# Patient Record
Sex: MALE | Race: BLACK OR AFRICAN AMERICAN | NOT HISPANIC OR LATINO | Employment: UNEMPLOYED | URBAN - METROPOLITAN AREA
[De-identification: names, ages, dates, MRNs, and addresses within clinical notes are randomized per-mention and may not be internally consistent; named-entity substitution may affect disease eponyms.]

---

## 2023-03-16 LAB
ANION GAP IN SER/PLAS: 10 MMOL/L (ref 10–20)
CALCIUM (MG/DL) IN SER/PLAS: 8.7 MG/DL (ref 8.6–10.3)
CARBON DIOXIDE, TOTAL (MMOL/L) IN SER/PLAS: 25 MMOL/L (ref 21–32)
CHLORIDE (MMOL/L) IN SER/PLAS: 105 MMOL/L (ref 98–107)
CREATININE (MG/DL) IN SER/PLAS: 1.22 MG/DL (ref 0.5–1.3)
GFR MALE: 74 ML/MIN/1.73M2
GLUCOSE (MG/DL) IN SER/PLAS: 114 MG/DL (ref 74–99)
POTASSIUM (MMOL/L) IN SER/PLAS: 3.5 MMOL/L (ref 3.5–5.3)
PROSTATE SPECIFIC AG (NG/ML) IN SER/PLAS: <0.01 NG/ML (ref 0–4)
SODIUM (MMOL/L) IN SER/PLAS: 136 MMOL/L (ref 136–145)
UREA NITROGEN (MG/DL) IN SER/PLAS: 11 MG/DL (ref 6–23)

## 2023-04-11 ENCOUNTER — HOSPITAL ENCOUNTER (OUTPATIENT)
Dept: DATA CONVERSION | Facility: HOSPITAL | Age: 48
End: 2023-04-11
Attending: INTERNAL MEDICINE | Admitting: INTERNAL MEDICINE
Payer: COMMERCIAL

## 2023-04-11 DIAGNOSIS — Z85.46 PERSONAL HISTORY OF MALIGNANT NEOPLASM OF PROSTATE: ICD-10-CM

## 2023-04-11 DIAGNOSIS — F17.200 NICOTINE DEPENDENCE, UNSPECIFIED, UNCOMPLICATED: ICD-10-CM

## 2023-04-11 DIAGNOSIS — K52.9 NONINFECTIVE GASTROENTERITIS AND COLITIS, UNSPECIFIED: ICD-10-CM

## 2023-04-11 DIAGNOSIS — R11.0 NAUSEA: ICD-10-CM

## 2023-04-11 DIAGNOSIS — K21.9 GASTRO-ESOPHAGEAL REFLUX DISEASE WITHOUT ESOPHAGITIS: ICD-10-CM

## 2023-04-11 DIAGNOSIS — F32.A DEPRESSION, UNSPECIFIED: ICD-10-CM

## 2023-04-11 DIAGNOSIS — K64.0 FIRST DEGREE HEMORRHOIDS: ICD-10-CM

## 2023-04-11 DIAGNOSIS — K44.9 DIAPHRAGMATIC HERNIA WITHOUT OBSTRUCTION OR GANGRENE: ICD-10-CM

## 2023-04-11 DIAGNOSIS — N52.9 MALE ERECTILE DYSFUNCTION, UNSPECIFIED: ICD-10-CM

## 2023-04-11 DIAGNOSIS — R19.7 DIARRHEA, UNSPECIFIED: ICD-10-CM

## 2023-04-11 DIAGNOSIS — G47.33 OBSTRUCTIVE SLEEP APNEA (ADULT) (PEDIATRIC): ICD-10-CM

## 2023-04-11 DIAGNOSIS — R10.11 RIGHT UPPER QUADRANT PAIN: ICD-10-CM

## 2023-04-11 DIAGNOSIS — K57.30 DIVERTICULOSIS OF LARGE INTESTINE WITHOUT PERFORATION OR ABSCESS WITHOUT BLEEDING: ICD-10-CM

## 2023-04-11 DIAGNOSIS — R12 HEARTBURN: ICD-10-CM

## 2023-04-14 LAB
COMPLETE PATHOLOGY REPORT: NORMAL
CONVERTED CLINICAL DIAGNOSIS-HISTORY: NORMAL
CONVERTED FINAL DIAGNOSIS: NORMAL
CONVERTED FINAL REPORT PDF LINK TO COPY AND PASTE: NORMAL
CONVERTED GROSS DESCRIPTION: NORMAL

## 2023-04-19 DIAGNOSIS — M15.9 OSTEOARTHRITIS, GENERALIZED: Primary | ICD-10-CM

## 2023-04-19 RX ORDER — ACETAMINOPHEN 500 MG
TABLET ORAL
Qty: 180 TABLET | Refills: 11 | Status: SHIPPED | OUTPATIENT
Start: 2023-04-19 | End: 2024-04-11

## 2023-05-08 ENCOUNTER — OFFICE VISIT (OUTPATIENT)
Dept: PRIMARY CARE | Facility: CLINIC | Age: 48
End: 2023-05-08
Payer: COMMERCIAL

## 2023-05-08 VITALS
HEART RATE: 96 BPM | WEIGHT: 171 LBS | SYSTOLIC BLOOD PRESSURE: 118 MMHG | BODY MASS INDEX: 23.85 KG/M2 | TEMPERATURE: 98.7 F | DIASTOLIC BLOOD PRESSURE: 68 MMHG | OXYGEN SATURATION: 98 %

## 2023-05-08 DIAGNOSIS — M15.9 GENERALIZED OA: ICD-10-CM

## 2023-05-08 DIAGNOSIS — C61 MALIGNANT NEOPLASM OF PROSTATE (MULTI): ICD-10-CM

## 2023-05-08 DIAGNOSIS — I10 ESSENTIAL HYPERTENSION: ICD-10-CM

## 2023-05-08 DIAGNOSIS — F33.1 MODERATE RECURRENT MAJOR DEPRESSION (MULTI): ICD-10-CM

## 2023-05-08 DIAGNOSIS — K21.00 GASTROESOPHAGEAL REFLUX DISEASE WITH ESOPHAGITIS WITHOUT HEMORRHAGE: ICD-10-CM

## 2023-05-08 DIAGNOSIS — F41.1 ANXIETY STATE: Primary | ICD-10-CM

## 2023-05-08 DIAGNOSIS — N52.9 ERECTILE DISORDER: ICD-10-CM

## 2023-05-08 PROCEDURE — 99214 OFFICE O/P EST MOD 30 MIN: CPT | Performed by: FAMILY MEDICINE

## 2023-05-08 PROCEDURE — 3074F SYST BP LT 130 MM HG: CPT | Performed by: FAMILY MEDICINE

## 2023-05-08 PROCEDURE — 3078F DIAST BP <80 MM HG: CPT | Performed by: FAMILY MEDICINE

## 2023-05-08 RX ORDER — OMEPRAZOLE 20 MG/1
20 CAPSULE, DELAYED RELEASE ORAL
COMMUNITY
End: 2023-05-08 | Stop reason: SDUPTHER

## 2023-05-08 RX ORDER — LOSARTAN POTASSIUM AND HYDROCHLOROTHIAZIDE 25; 100 MG/1; MG/1
1 TABLET ORAL DAILY
COMMUNITY
Start: 2023-02-20 | End: 2023-05-08 | Stop reason: SDUPTHER

## 2023-05-08 RX ORDER — TAMSULOSIN HYDROCHLORIDE 0.4 MG/1
0.4 CAPSULE ORAL DAILY
Qty: 90 CAPSULE | Refills: 1 | Status: SHIPPED | OUTPATIENT
Start: 2023-05-08 | End: 2024-03-04 | Stop reason: SDUPTHER

## 2023-05-08 RX ORDER — DICYCLOMINE HYDROCHLORIDE 10 MG/1
10 CAPSULE ORAL 3 TIMES DAILY PRN
COMMUNITY
End: 2023-08-14 | Stop reason: SDUPTHER

## 2023-05-08 RX ORDER — DICLOFENAC SODIUM 50 MG/1
50 TABLET, DELAYED RELEASE ORAL DAILY
Qty: 90 TABLET | Refills: 1 | Status: SHIPPED | OUTPATIENT
Start: 2023-05-08 | End: 2023-08-09 | Stop reason: SDUPTHER

## 2023-05-08 RX ORDER — BUPROPION HYDROCHLORIDE 150 MG/1
300 TABLET ORAL EVERY MORNING
COMMUNITY
Start: 2022-03-16 | End: 2024-04-04 | Stop reason: WASHOUT

## 2023-05-08 RX ORDER — LOSARTAN POTASSIUM AND HYDROCHLOROTHIAZIDE 25; 100 MG/1; MG/1
1 TABLET ORAL DAILY
Qty: 90 TABLET | Refills: 3 | Status: SHIPPED | OUTPATIENT
Start: 2023-05-08 | End: 2024-02-27 | Stop reason: SDUPTHER

## 2023-05-08 RX ORDER — TRAZODONE HYDROCHLORIDE 50 MG/1
50 TABLET ORAL NIGHTLY
Qty: 90 TABLET | Refills: 1 | Status: SHIPPED | OUTPATIENT
Start: 2023-05-08 | End: 2023-11-16 | Stop reason: ALTCHOICE

## 2023-05-08 RX ORDER — QUETIAPINE FUMARATE 50 MG/1
50 TABLET, FILM COATED ORAL NIGHTLY
Qty: 90 TABLET | Refills: 2 | Status: SHIPPED | OUTPATIENT
Start: 2023-05-08 | End: 2023-09-13 | Stop reason: SDUPTHER

## 2023-05-08 RX ORDER — TAMSULOSIN HYDROCHLORIDE 0.4 MG/1
0.4 CAPSULE ORAL DAILY
COMMUNITY
End: 2023-05-08 | Stop reason: SDUPTHER

## 2023-05-08 RX ORDER — SILDENAFIL 100 MG/1
TABLET, FILM COATED ORAL
Qty: 10 TABLET | Refills: 2 | Status: SHIPPED | OUTPATIENT
Start: 2023-05-08 | End: 2023-08-07

## 2023-05-08 RX ORDER — DICLOFENAC SODIUM 50 MG/1
50 TABLET, DELAYED RELEASE ORAL DAILY
COMMUNITY
End: 2023-05-08 | Stop reason: SDUPTHER

## 2023-05-08 RX ORDER — TRAZODONE HYDROCHLORIDE 50 MG/1
50 TABLET ORAL NIGHTLY
COMMUNITY
End: 2023-05-08 | Stop reason: SDUPTHER

## 2023-05-08 RX ORDER — OMEPRAZOLE 20 MG/1
20 CAPSULE, DELAYED RELEASE ORAL
Qty: 180 CAPSULE | Refills: 2 | Status: SHIPPED | OUTPATIENT
Start: 2023-05-08 | End: 2023-11-16

## 2023-05-08 RX ORDER — QUETIAPINE FUMARATE 50 MG/1
50 TABLET, FILM COATED ORAL NIGHTLY
COMMUNITY
End: 2023-05-08 | Stop reason: SDUPTHER

## 2023-05-08 RX ORDER — CELECOXIB 100 MG/1
100 CAPSULE ORAL 2 TIMES DAILY
COMMUNITY
End: 2023-05-08 | Stop reason: ALTCHOICE

## 2023-05-08 ASSESSMENT — ENCOUNTER SYMPTOMS
INSOMNIA: 1
HYPERTENSION: 1

## 2023-05-08 NOTE — PROGRESS NOTES
Subjective   Patient ID: Shakila Shah is a 47 y.o. male who presents for Hypertension (Recheck) and Insomnia.  Hypertension    Insomnia      ED: Helped by Viagra    Prostate CA: Patient is following up with with urology    Anxiety: having a lot of mood swings.  People are wondering if he could have bipolar.    OA:  likes diclofenac better than Celebrex    HTN: controlled, taking meds    Abdominal pain: Saw GI who thinks that he has more of an irritable bowel syndrome      Current Outpatient Medications on File Prior to Visit   Medication Sig Dispense Refill    buPROPion XL (Wellbutrin XL) 150 mg 24 hr tablet Take 2 tablets (300 mg) by mouth once daily in the morning.      diclofenac (Voltaren) 50 mg EC tablet Take 1 tablet (50 mg) by mouth once daily. Do not crush, chew, or split.      dicyclomine (Bentyl) 10 mg capsule Take 1 capsule (10 mg) by mouth 3 times a day as needed (cramping).      losartan-hydrochlorothiazide (Hyzaar) 100-25 mg tablet Take 1 tablet by mouth once daily.      omeprazole (PriLOSEC) 20 mg DR capsule Take 1 capsule (20 mg) by mouth 2 times a day before meals.      QUEtiapine (SEROquel) 50 mg tablet Take 1 tablet (50 mg) by mouth once daily at bedtime.      sildenafil (Viagra) 100 mg tablet TAKE ONE TABLET BY MOUTH DAILY AS NEEDED FOR COITUS (VIAL) 10 tablet 0    tamsulosin (Flomax) 0.4 mg 24 hr capsule Take 1 capsule (0.4 mg) by mouth once daily.      acetaminophen (Tylenol) 500 mg tablet TAKE TWO TABLETS BY MOUTH EVERY 8 HOURS AS NEEDED FOR PAIN (VIAL) 180 tablet 11    celecoxib (CeleBREX) 100 mg capsule Take 1 capsule (100 mg) by mouth 2 times a day.      traZODone (Desyrel) 50 mg tablet Take 1 tablet (50 mg) by mouth once daily at bedtime.       No current facility-administered medications on file prior to visit.        Vitals:    05/08/23 0856   BP: 118/68   Pulse: 96   Temp: 37.1 °C (98.7 °F)   SpO2: 98%       Review of Systems   Psychiatric/Behavioral:  The patient has insomnia.     All other systems reviewed and are negative.      Objective     Physical Exam  Constitutional:       Appearance: Normal appearance. He is well-developed.   HENT:      Head: Atraumatic.   Cardiovascular:      Rate and Rhythm: Normal rate and regular rhythm.      Heart sounds: Normal heart sounds. No murmur heard.  Pulmonary:      Effort: Pulmonary effort is normal.      Breath sounds: Normal breath sounds.   Abdominal:      General: Bowel sounds are normal.      Palpations: Abdomen is soft.   Skin:     General: Skin is warm.   Neurological:      General: No focal deficit present.      Mental Status: He is alert.   Psychiatric:         Mood and Affect: Mood normal.         Orders Only on 03/16/2023   Component Date Value Ref Range Status    Glucose 03/16/2023 114 (H)  74 - 99 mg/dL Final    Sodium 03/16/2023 136  136 - 145 mmol/L Final    Potassium 03/16/2023 3.5  3.5 - 5.3 mmol/L Final    Chloride 03/16/2023 105  98 - 107 mmol/L Final    Bicarbonate 03/16/2023 25  21 - 32 mmol/L Final    Anion Gap 03/16/2023 10  10 - 20 mmol/L Final    Urea Nitrogen 03/16/2023 11  6 - 23 mg/dL Final    Creatinine 03/16/2023 1.22  0.50 - 1.30 mg/dL Final    GFR MALE 03/16/2023 74  >90 mL/min/1.73m2 Final    Comment:  CALCULATIONS OF ESTIMATED GFR ARE PERFORMED   USING THE 2021 CKD-EPI STUDY REFIT EQUATION   WITHOUT THE RACE VARIABLE FOR THE IDMS-TRACEABLE   CREATININE METHODS.    https://jasn.asnjournals.org/content/early/2021/09/22/ASN.7564016725      Calcium 03/16/2023 8.7  8.6 - 10.3 mg/dL Final   Orders Only on 03/16/2023   Component Date Value Ref Range Status    PSA 03/16/2023 <0.01 (LL)  0.00 - 4.00 ng/mL Final    Comment: The FDA requires that the method used for PSA assay be   reported to the physician. Values obtained with different   assay methods must not be used interchangeably. This test  was performed at [facility] using the Access   Hybritech PSA assay is a two-site immunoenzymatic sandwich   assay.  The assay is approved for measurement of   prostate-specific antigen (PSA)in serum and may be used   in conjunction with a digital rectal examination in men   50 years and older as an aid in detection of prostate   cancer.  5-Alpha-reductase inhibitors (e.g. Proscar, Finasteride,   Avodart, Dutasteride and Marissa) for the treatment of BPH   have been shown to lower PSA levels by an average of 50%   after 6 months of treatment.         Assessment/Plan   Problem List Items Addressed This Visit       Anxiety state - Primary    Relevant Medications    QUEtiapine (SEROquel) 50 mg tablet    Essential hypertension    Relevant Medications    losartan-hydrochlorothiazide (Hyzaar) 100-25 mg tablet    Malignant neoplasm of prostate (CMS/HCC)    Relevant Medications    tamsulosin (Flomax) 0.4 mg 24 hr capsule    Other Relevant Orders    Comprehensive Metabolic Panel    CBC and Auto Differential    Moderate recurrent major depression (CMS/HCC)    Relevant Medications    QUEtiapine (SEROquel) 50 mg tablet    traZODone (Desyrel) 50 mg tablet     Other Visit Diagnoses       Erectile disorder        Relevant Medications    sildenafil (Viagra) 100 mg tablet    Generalized OA        Relevant Medications    diclofenac (Voltaren) 50 mg EC tablet    Gastroesophageal reflux disease with esophagitis without hemorrhage        Relevant Medications    omeprazole (PriLOSEC) 20 mg DR capsule          Refilled pts meds.  Continue specialist fu.  Encouraged pt to check out [facility] [place].  Also encouraged pt to check out [facility].  Fu in 3 mo

## 2023-07-07 DIAGNOSIS — K21.00 GASTROESOPHAGEAL REFLUX DISEASE WITH ESOPHAGITIS WITHOUT HEMORRHAGE: Primary | ICD-10-CM

## 2023-07-09 RX ORDER — FAMOTIDINE 20 MG/1
TABLET, FILM COATED ORAL
Qty: 60 TABLET | Refills: 4 | Status: SHIPPED | OUTPATIENT
Start: 2023-07-09 | End: 2023-11-30 | Stop reason: SDUPTHER

## 2023-07-19 DIAGNOSIS — N52.9 ERECTILE DISORDER: ICD-10-CM

## 2023-07-26 RX ORDER — SILDENAFIL 100 MG/1
TABLET, FILM COATED ORAL
Qty: 10 TABLET | Refills: 11 | OUTPATIENT
Start: 2023-07-26

## 2023-08-04 RX ORDER — DICYCLOMINE HYDROCHLORIDE 10 MG/1
CAPSULE ORAL
Qty: 90 CAPSULE | Refills: 0 | OUTPATIENT
Start: 2023-08-04

## 2023-08-07 DIAGNOSIS — N52.9 ERECTILE DISORDER: ICD-10-CM

## 2023-08-07 RX ORDER — SILDENAFIL 100 MG/1
TABLET, FILM COATED ORAL
Qty: 10 TABLET | Refills: 0 | Status: SHIPPED | OUTPATIENT
Start: 2023-08-07 | End: 2023-09-06

## 2023-08-09 DIAGNOSIS — M15.9 GENERALIZED OA: ICD-10-CM

## 2023-08-09 RX ORDER — DICLOFENAC SODIUM 50 MG/1
50 TABLET, DELAYED RELEASE ORAL DAILY
Qty: 90 TABLET | Refills: 1 | Status: SHIPPED | OUTPATIENT
Start: 2023-08-09 | End: 2023-11-16 | Stop reason: ALTCHOICE

## 2023-08-14 ENCOUNTER — TELEPHONE (OUTPATIENT)
Dept: PRIMARY CARE | Facility: CLINIC | Age: 48
End: 2023-08-14
Payer: COMMERCIAL

## 2023-08-14 DIAGNOSIS — K52.9 IBD (INFLAMMATORY BOWEL DISEASE): Primary | ICD-10-CM

## 2023-08-14 RX ORDER — DICYCLOMINE HYDROCHLORIDE 10 MG/1
10 CAPSULE ORAL 3 TIMES DAILY
Qty: 180 CAPSULE | Refills: 1 | Status: SHIPPED | OUTPATIENT
Start: 2023-08-14 | End: 2023-09-13 | Stop reason: SDUPTHER

## 2023-08-14 NOTE — TELEPHONE ENCOUNTER
Need refill on this medicine   dicyclomine (Bentyl) 10 mg capsule   Please send to [facility] [place]

## 2023-09-01 PROBLEM — K64.8 INTERNAL HEMORRHOIDS: Status: ACTIVE | Noted: 2023-09-01

## 2023-09-01 PROBLEM — S82.853A TRIMALLEOLAR FRACTURE OF ANKLE, CLOSED: Status: ACTIVE | Noted: 2017-01-16

## 2023-09-01 PROBLEM — C44.92 SCC (SQUAMOUS CELL CARCINOMA): Status: ACTIVE | Noted: 2023-09-01

## 2023-09-01 PROBLEM — Z90.79 HISTORY OF ROBOT-ASSISTED LAPAROSCOPIC RADICAL PROSTATECTOMY: Status: ACTIVE | Noted: 2023-09-01

## 2023-09-01 PROBLEM — N13.9 LOWER URINARY OBSTRUCTIVE SYMPTOM: Status: ACTIVE | Noted: 2023-09-01

## 2023-09-01 PROBLEM — R97.20 ELEVATED PSA: Status: ACTIVE | Noted: 2023-09-01

## 2023-09-01 PROBLEM — K57.30 DIVERTICULOSIS OF COLON: Status: ACTIVE | Noted: 2023-09-01

## 2023-09-01 PROBLEM — H57.89 IRRITATION OF RIGHT EYE: Status: ACTIVE | Noted: 2023-09-01

## 2023-09-01 PROBLEM — K44.9 HIATAL HERNIA: Status: ACTIVE | Noted: 2023-09-01

## 2023-09-01 PROBLEM — H57.89 EYES SWOLLEN: Status: ACTIVE | Noted: 2023-09-01

## 2023-09-01 PROBLEM — R11.2 NAUSEA, VOMITING, AND DIARRHEA: Status: ACTIVE | Noted: 2023-09-01

## 2023-09-01 PROBLEM — H20.9 UVEITIS: Status: ACTIVE | Noted: 2023-09-01

## 2023-09-01 PROBLEM — R19.7 NAUSEA, VOMITING, AND DIARRHEA: Status: ACTIVE | Noted: 2023-09-01

## 2023-09-01 PROBLEM — K29.70 GASTRITIS: Status: ACTIVE | Noted: 2023-09-01

## 2023-09-01 PROBLEM — F52.4 PREMATURE EJACULATION: Status: ACTIVE | Noted: 2023-09-01

## 2023-09-01 RX ORDER — CELECOXIB 100 MG/1
1 CAPSULE ORAL 2 TIMES DAILY
COMMUNITY
Start: 2023-05-23

## 2023-09-01 RX ORDER — POLYETHYLENE GLYCOL 3350 17 G/17G
17 POWDER, FOR SOLUTION ORAL AS NEEDED
COMMUNITY
Start: 2023-06-06

## 2023-09-01 RX ORDER — BUPROPION HYDROCHLORIDE 300 MG/1
300 TABLET ORAL DAILY
COMMUNITY
Start: 2023-07-17 | End: 2023-10-08

## 2023-09-01 RX ORDER — LIDOCAINE AND PRILOCAINE 25; 25 MG/G; MG/G
CREAM TOPICAL
COMMUNITY
Start: 2023-08-07 | End: 2023-11-16 | Stop reason: SDUPTHER

## 2023-09-01 RX ORDER — NALTREXONE HYDROCHLORIDE 50 MG/1
50 TABLET, FILM COATED ORAL DAILY
COMMUNITY

## 2023-09-06 DIAGNOSIS — N52.9 ERECTILE DISORDER: ICD-10-CM

## 2023-09-06 RX ORDER — SILDENAFIL 100 MG/1
TABLET, FILM COATED ORAL
Qty: 10 TABLET | Refills: 0 | Status: SHIPPED | OUTPATIENT
Start: 2023-09-06 | End: 2023-09-18

## 2023-09-07 VITALS — HEIGHT: 71 IN | BODY MASS INDEX: 24.01 KG/M2 | WEIGHT: 171.52 LBS

## 2023-09-11 RX ORDER — BUPROPION HYDROCHLORIDE 300 MG/1
300 TABLET ORAL DAILY
Qty: 90 TABLET | Refills: 1 | OUTPATIENT
Start: 2023-09-11

## 2023-09-13 ENCOUNTER — OFFICE VISIT (OUTPATIENT)
Dept: PRIMARY CARE | Facility: CLINIC | Age: 48
End: 2023-09-13
Payer: COMMERCIAL

## 2023-09-13 VITALS
TEMPERATURE: 98.5 F | OXYGEN SATURATION: 97 % | WEIGHT: 175 LBS | DIASTOLIC BLOOD PRESSURE: 60 MMHG | BODY MASS INDEX: 24.47 KG/M2 | HEART RATE: 94 BPM | SYSTOLIC BLOOD PRESSURE: 124 MMHG

## 2023-09-13 DIAGNOSIS — C61 MALIGNANT NEOPLASM OF PROSTATE (MULTI): ICD-10-CM

## 2023-09-13 DIAGNOSIS — F41.1 ANXIETY STATE: Primary | ICD-10-CM

## 2023-09-13 DIAGNOSIS — I10 ESSENTIAL HYPERTENSION: ICD-10-CM

## 2023-09-13 DIAGNOSIS — K52.9 IBD (INFLAMMATORY BOWEL DISEASE): ICD-10-CM

## 2023-09-13 PROCEDURE — 3074F SYST BP LT 130 MM HG: CPT | Performed by: FAMILY MEDICINE

## 2023-09-13 PROCEDURE — 3078F DIAST BP <80 MM HG: CPT | Performed by: FAMILY MEDICINE

## 2023-09-13 PROCEDURE — 99214 OFFICE O/P EST MOD 30 MIN: CPT | Performed by: FAMILY MEDICINE

## 2023-09-13 RX ORDER — DICYCLOMINE HYDROCHLORIDE 10 MG/1
10 CAPSULE ORAL 3 TIMES DAILY
Qty: 270 CAPSULE | Refills: 1 | Status: SHIPPED | OUTPATIENT
Start: 2023-09-13 | End: 2023-09-18 | Stop reason: SDUPTHER

## 2023-09-13 RX ORDER — CLONAZEPAM 1 MG/1
1 TABLET ORAL NIGHTLY
Qty: 30 TABLET | Refills: 0 | Status: SHIPPED | OUTPATIENT
Start: 2023-09-13 | End: 2023-09-18 | Stop reason: SDUPTHER

## 2023-09-13 RX ORDER — NORTRIPTYLINE HYDROCHLORIDE 25 MG/1
25 CAPSULE ORAL NIGHTLY
Qty: 60 CAPSULE | Refills: 5 | Status: SHIPPED | OUTPATIENT
Start: 2023-09-13 | End: 2023-09-18 | Stop reason: SDUPTHER

## 2023-09-13 ASSESSMENT — ENCOUNTER SYMPTOMS
INSOMNIA: 1
HYPERTENSION: 1

## 2023-09-13 NOTE — PROGRESS NOTES
Subjective   Patient ID: Shakila Shah is a 47 y.o. male who presents for Hypertension (Recheck. ).  Hypertension    Insomnia      ED: Helped by Viagra    Prostate CA: Patient is following up with with urology    Anxiety: Has been continuing to have mood swings.  Recently was struck by the tragedy of his son who  in a car crash after being chased by police officers 2 weeks ago.  He still is not sure about this.  The Seroquel was not helpful for him.  He may feel groggy during daytime and did not help him with his mood swings either.    OA:  likes diclofenac better than Celebrex    HTN: controlled, taking meds    Abdominal pain: Helped by the dicyclomine 3 times a day.      Current Outpatient Medications on File Prior to Visit   Medication Sig Dispense Refill    acetaminophen (Tylenol) 500 mg tablet TAKE TWO TABLETS BY MOUTH EVERY 8 HOURS AS NEEDED FOR PAIN (VIAL) 180 tablet 11    buPROPion XL (Wellbutrin XL) 150 mg 24 hr tablet Take 2 tablets (300 mg) by mouth once daily in the morning.      buPROPion XL (Wellbutrin XL) 300 mg 24 hr tablet Take 1 tablet (300 mg) by mouth once daily. Take at 9 AM      celecoxib (CeleBREX) 100 mg capsule Take 1 capsule (100 mg) by mouth 2 times a day.      diclofenac (Voltaren) 50 mg EC tablet Take 1 tablet (50 mg) by mouth once daily. Do not crush, chew, or split. 90 tablet 1    famotidine (Pepcid) 20 mg tablet TAKE ONE TABLET BY MOUTH TWICE DAILY @9AM & 5PM 60 tablet 4    lidocaine-prilocaine (Emla) 2.5-2.5 % cream       losartan-hydrochlorothiazide (Hyzaar) 100-25 mg tablet Take 1 tablet by mouth once daily. 90 tablet 3    naltrexone (Depade) 50 mg tablet Take 1 tablet (50 mg) by mouth once daily.      omeprazole (PriLOSEC) 20 mg DR capsule Take 1 capsule (20 mg) by mouth 2 times a day before meals. 180 capsule 2    polyethylene glycol (Glycolax, Miralax) 17 gram/dose powder       sildenafil (Viagra) 100 mg tablet TAKE ONE TABLET BY MOUTH DAILY AS NEEDED FOR COITUS (VIAL) 10  tablet 0    tamsulosin (Flomax) 0.4 mg 24 hr capsule Take 1 capsule (0.4 mg) by mouth once daily. 90 capsule 1    traZODone (Desyrel) 50 mg tablet Take 1 tablet (50 mg) by mouth once daily at bedtime. 90 tablet 1    WHEAT DEXTRIN ORAL Take by mouth. Mix 1 tablespoon in water or juice daily for regular bowel habits      [DISCONTINUED] dicyclomine (Bentyl) 10 mg capsule Take 1 capsule (10 mg) by mouth 3 times a day. 180 capsule 1    [DISCONTINUED] QUEtiapine (SEROquel) 50 mg tablet Take 1 tablet (50 mg) by mouth once daily at bedtime. 90 tablet 2     No current facility-administered medications on file prior to visit.        Vitals:    09/13/23 1509   BP: 124/60   Pulse: 94   Temp: 36.9 °C (98.5 °F)   SpO2: 97%       Review of Systems   All other systems reviewed and are negative.      Objective     Physical Exam  Constitutional:       Appearance: Normal appearance. He is well-developed.   HENT:      Head: Atraumatic.   Cardiovascular:      Rate and Rhythm: Normal rate and regular rhythm.      Heart sounds: Normal heart sounds. No murmur heard.  Pulmonary:      Effort: Pulmonary effort is normal.      Breath sounds: Normal breath sounds.   Abdominal:      General: Bowel sounds are normal.      Palpations: Abdomen is soft.   Skin:     General: Skin is warm.   Neurological:      General: No focal deficit present.      Mental Status: He is alert.   Psychiatric:         Mood and Affect: Mood normal.         No visits with results within 2 Month(s) from this visit.   Latest known visit with results is:   Orders Only on 03/16/2023   Component Date Value Ref Range Status    Glucose 03/16/2023 114 (H)  74 - 99 mg/dL Final    Sodium 03/16/2023 136  136 - 145 mmol/L Final    Potassium 03/16/2023 3.5  3.5 - 5.3 mmol/L Final    Chloride 03/16/2023 105  98 - 107 mmol/L Final    Bicarbonate 03/16/2023 25  21 - 32 mmol/L Final    Anion Gap 03/16/2023 10  10 - 20 mmol/L Final    Urea Nitrogen 03/16/2023 11  6 - 23 mg/dL Final     Creatinine 03/16/2023 1.22  0.50 - 1.30 mg/dL Final    GFR MALE 03/16/2023 74  >90 mL/min/1.73m2 Final    Comment:  CALCULATIONS OF ESTIMATED GFR ARE PERFORMED   USING THE 2021 CKD-EPI STUDY REFIT EQUATION   WITHOUT THE RACE VARIABLE FOR THE IDMS-TRACEABLE   CREATININE METHODS.    https://jasn.asnjournals.org/content/early/2021/09/22/ASN.4086711304      Calcium 03/16/2023 8.7  8.6 - 10.3 mg/dL Final       Assessment/Plan   Problem List Items Addressed This Visit       Anxiety state - Primary    Relevant Medications    nortriptyline (Pamelor) 25 mg capsule    clonazePAM (KlonoPIN) 1 mg tablet    Essential hypertension    Malignant neoplasm of prostate (CMS/HCC)     Other Visit Diagnoses       IBD (inflammatory bowel disease)        Relevant Medications    dicyclomine (Bentyl) 10 mg capsule          Counseled patient after loss of his son.  We will for limited Klonopin to take at nighttime to try to help him to set a better sleep schedule.  Try nortriptyline as well.  Follow-up in 2 months

## 2023-09-18 ENCOUNTER — TELEPHONE (OUTPATIENT)
Dept: PRIMARY CARE | Facility: CLINIC | Age: 48
End: 2023-09-18
Payer: COMMERCIAL

## 2023-09-18 DIAGNOSIS — N52.9 ERECTILE DISORDER: ICD-10-CM

## 2023-09-18 DIAGNOSIS — K52.9 IBD (INFLAMMATORY BOWEL DISEASE): ICD-10-CM

## 2023-09-18 DIAGNOSIS — F41.1 ANXIETY STATE: ICD-10-CM

## 2023-09-18 RX ORDER — DICYCLOMINE HYDROCHLORIDE 10 MG/1
10 CAPSULE ORAL 3 TIMES DAILY
Qty: 270 CAPSULE | Refills: 1 | Status: SHIPPED | OUTPATIENT
Start: 2023-09-18 | End: 2024-02-27 | Stop reason: SDUPTHER

## 2023-09-18 RX ORDER — NORTRIPTYLINE HYDROCHLORIDE 25 MG/1
25 CAPSULE ORAL NIGHTLY
Qty: 60 CAPSULE | Refills: 5 | Status: SHIPPED | OUTPATIENT
Start: 2023-09-18 | End: 2024-02-27 | Stop reason: SDUPTHER

## 2023-09-18 RX ORDER — CLONAZEPAM 1 MG/1
1 TABLET ORAL NIGHTLY
Qty: 30 TABLET | Refills: 0 | Status: SHIPPED | OUTPATIENT
Start: 2023-09-18 | End: 2023-10-20

## 2023-09-18 RX ORDER — SILDENAFIL 100 MG/1
TABLET, FILM COATED ORAL
Qty: 10 TABLET | Refills: 0 | Status: SHIPPED | OUTPATIENT
Start: 2023-09-18 | End: 2023-10-31 | Stop reason: SDUPTHER

## 2023-09-18 NOTE — TELEPHONE ENCOUNTER
Patient medicine went to wrong pharmacy needs to go to [facility] [address] [place]  nortriptyline (Pamelor) 25 mg capsul  clonazePAM (KlonoPIN) 1 mg tablet e   dicyclomine (Bentyl) 10 mg capsule

## 2023-10-06 DIAGNOSIS — F41.1 ANXIETY STATE: Primary | ICD-10-CM

## 2023-10-08 RX ORDER — BUPROPION HYDROCHLORIDE 300 MG/1
300 TABLET ORAL DAILY
Qty: 90 TABLET | Refills: 0 | Status: SHIPPED | OUTPATIENT
Start: 2023-10-08 | End: 2023-12-21

## 2023-10-16 DIAGNOSIS — N52.9 ERECTILE DISORDER: ICD-10-CM

## 2023-10-16 RX ORDER — SILDENAFIL 100 MG/1
TABLET, FILM COATED ORAL
Qty: 10 TABLET | Refills: 11 | OUTPATIENT
Start: 2023-10-16

## 2023-10-20 DIAGNOSIS — F41.1 ANXIETY STATE: ICD-10-CM

## 2023-10-20 RX ORDER — CLONAZEPAM 1 MG/1
TABLET ORAL
Qty: 30 TABLET | Refills: 0 | Status: SHIPPED | OUTPATIENT
Start: 2023-10-20 | End: 2023-11-16 | Stop reason: SDUPTHER

## 2023-10-21 NOTE — PROGRESS NOTES
Subjective   Patient ID: Shakila Shah is a 47 y.o. male who presents for No chief complaint on file..  HPI    Patient Active Problem List   Diagnosis    Anxiety state    Erectile dysfunction of organic origin    Essential hypertension    Generalized osteoarthritis    Malignant neoplasm of prostate (CMS/HCC)    Moderate recurrent major depression (CMS/HCC)    Diverticulosis of colon    Elevated PSA    Gastritis    Hiatal hernia    Internal hemorrhoids    Irritation of right eye    Lower urinary obstructive symptom    Nausea, vomiting, and diarrhea    Trimalleolar fracture of ankle, closed    Uveitis    Eyes swollen    SCC (squamous cell carcinoma)    Premature ejaculation    History of robot-assisted laparoscopic radical prostatectomy       Social Connections: Not on file       Current Outpatient Medications on File Prior to Visit   Medication Sig Dispense Refill    acetaminophen (Tylenol) 500 mg tablet TAKE TWO TABLETS BY MOUTH EVERY 8 HOURS AS NEEDED FOR PAIN (VIAL) 180 tablet 11    buPROPion XL (Wellbutrin XL) 150 mg 24 hr tablet Take 2 tablets (300 mg) by mouth once daily in the morning.      buPROPion XL (Wellbutrin XL) 300 mg 24 hr tablet TAKE ONE TABLET BY MOUTH DAILY AT 9AM 90 tablet 0    celecoxib (CeleBREX) 100 mg capsule Take 1 capsule (100 mg) by mouth 2 times a day.      clonazePAM (KlonoPIN) 1 mg tablet Take 1 tablet (1 mg) by mouth once daily at bedtime. 30 tablet 0    diclofenac (Voltaren) 50 mg EC tablet Take 1 tablet (50 mg) by mouth once daily. Do not crush, chew, or split. 90 tablet 1    dicyclomine (Bentyl) 10 mg capsule Take 1 capsule (10 mg) by mouth 3 times a day. 270 capsule 1    famotidine (Pepcid) 20 mg tablet TAKE ONE TABLET BY MOUTH TWICE DAILY @9AM & 5PM 60 tablet 4    lidocaine-prilocaine (Emla) 2.5-2.5 % cream       losartan-hydrochlorothiazide (Hyzaar) 100-25 mg tablet Take 1 tablet by mouth once daily. 90 tablet 3    naltrexone (Depade) 50 mg tablet Take 1 tablet (50 mg) by mouth  once daily.      nortriptyline (Pamelor) 25 mg capsule Take 1 capsule (25 mg) by mouth once daily at bedtime. Inc to 2 caps after 1 month 60 capsule 5    omeprazole (PriLOSEC) 20 mg DR capsule Take 1 capsule (20 mg) by mouth 2 times a day before meals. 180 capsule 2    polyethylene glycol (Glycolax, Miralax) 17 gram/dose powder       sildenafil (Viagra) 100 mg tablet TAKE ONE TABLET BY MOUTH DAILY AS NEEDED FOR COITUS 10 tablet 0    tamsulosin (Flomax) 0.4 mg 24 hr capsule Take 1 capsule (0.4 mg) by mouth once daily. 90 capsule 1    traZODone (Desyrel) 50 mg tablet Take 1 tablet (50 mg) by mouth once daily at bedtime. 90 tablet 1    WHEAT DEXTRIN ORAL Take by mouth. Mix 1 tablespoon in water or juice daily for regular bowel habits       No current facility-administered medications on file prior to visit.        There were no vitals filed for this visit.  There were no vitals filed for this visit.    Review of Systems    Objective     Physical Exam    No visits with results within 2 Month(s) from this visit.   Latest known visit with results is:   Orders Only on 03/16/2023   Component Date Value Ref Range Status    Glucose 03/16/2023 114 (H)  74 - 99 mg/dL Final    Sodium 03/16/2023 136  136 - 145 mmol/L Final    Potassium 03/16/2023 3.5  3.5 - 5.3 mmol/L Final    Chloride 03/16/2023 105  98 - 107 mmol/L Final    Bicarbonate 03/16/2023 25  21 - 32 mmol/L Final    Anion Gap 03/16/2023 10  10 - 20 mmol/L Final    Urea Nitrogen 03/16/2023 11  6 - 23 mg/dL Final    Creatinine 03/16/2023 1.22  0.50 - 1.30 mg/dL Final    GFR MALE 03/16/2023 74  >90 mL/min/1.73m2 Final    Comment:  CALCULATIONS OF ESTIMATED GFR ARE PERFORMED   USING THE 2021 CKD-EPI STUDY REFIT EQUATION   WITHOUT THE RACE VARIABLE FOR THE IDMS-TRACEABLE   CREATININE METHODS.    https://jasn.asnjournals.org/content/early/2021/09/22/ASN.3298576171      Calcium 03/16/2023 8.7  8.6 - 10.3 mg/dL Final       Assessment/Plan

## 2023-10-21 NOTE — ASSESSMENT & PLAN NOTE
PSA:<0.10,      BETI: No leakage     ED: He has seen no improvement on OD dose of Cialis or Viagra. We discussed other options, including injection therapy and vacuum devices. I will refer him to one of my partners who specializes in men's health. We also discussed possible referral to a psychologist who specializes in sexual health. The patient agrees with this plan.

## 2023-10-21 NOTE — PROGRESS NOTES
Subjective   Patient ID: Shakila Shah is a 47 y.o. male with GGG 2 PCa 1 year s/p RALP who presents for 7 month FUV.     PSA:<0.10 last check and no repeat.     BETI: He is not wearing pads or Depends. No leakage.      ED: He is not able to have erections sufficient for intercourse. He is taking daily and OD dose of Cialis and Viagra with no improvement.   He states he can get engorged but not erect.  Has trialed a pump and a constriction band.  This is distressing to him and he is bothered by this as it is impacting his relationship. His sig other did leave the room during this discussion. He has seen a psychologist but not going back now.  He has not met with Dr. Dawkins.     Past medical, surgical, family and social history were reviewed and unchanged since last visit besides what is in the HPI.          Review of Systems   All other systems reviewed and are negative.      Objective   Physical Exam  Gen: No acute distress     Psych: Alert and oriented x3     Neuro:  Normal ROM    Resp: Nonlabored respirations     CV: Regular rate and rhythm     Abd: S, NT, ND.     : Deferred    Skin: Warm, dry and intact without rashes     Lymphatics: No peripheral edema       Assessment/Plan   Problem List Items Addressed This Visit             ICD-10-CM       Hematology and Neoplasia    Malignant neoplasm of prostate (CMS/HCC) - Primary C61     PSA:<0.10,      BETI: No leakage     ED: He has seen no improvement on OD dose of Cialis or Viagra. We discussed other options, including injection therapy and vacuum devices. I will refer him to one of my partners who specializes in men's health. We also discussed possible referral to a psychologist who specializes in sexual health. The patient agrees with this plan.                 PLAN:  6 month FUV with repeat PSA.  PSA today as well  Went over [facility] and [facility], will have him see Dr Dawkins  He should continue seeing psychology regarding his relationship strife which is not totally due to  his erectile dysfunction.

## 2023-10-30 ENCOUNTER — LAB (OUTPATIENT)
Dept: LAB | Facility: LAB | Age: 48
End: 2023-10-30
Payer: COMMERCIAL

## 2023-10-30 DIAGNOSIS — C61 MALIGNANT NEOPLASM OF PROSTATE (MULTI): ICD-10-CM

## 2023-10-30 LAB
ALBUMIN SERPL BCP-MCNC: 4.1 G/DL (ref 3.4–5)
ALP SERPL-CCNC: 70 U/L (ref 33–120)
ALT SERPL W P-5'-P-CCNC: 25 U/L (ref 10–52)
ANION GAP SERPL CALC-SCNC: 12 MMOL/L (ref 10–20)
AST SERPL W P-5'-P-CCNC: 15 U/L (ref 9–39)
BASOPHILS # BLD AUTO: 0.1 X10*3/UL (ref 0–0.1)
BASOPHILS NFR BLD AUTO: 0.8 %
BILIRUB SERPL-MCNC: 0.5 MG/DL (ref 0–1.2)
BUN SERPL-MCNC: 10 MG/DL (ref 6–23)
CALCIUM SERPL-MCNC: 9.1 MG/DL (ref 8.6–10.3)
CHLORIDE SERPL-SCNC: 106 MMOL/L (ref 98–107)
CO2 SERPL-SCNC: 23 MMOL/L (ref 21–32)
CREAT SERPL-MCNC: 1.16 MG/DL (ref 0.5–1.3)
EOSINOPHIL # BLD AUTO: 0.21 X10*3/UL (ref 0–0.7)
EOSINOPHIL NFR BLD AUTO: 1.7 %
ERYTHROCYTE [DISTWIDTH] IN BLOOD BY AUTOMATED COUNT: 14.1 % (ref 11.5–14.5)
GFR SERPL CREATININE-BSD FRML MDRD: 79 ML/MIN/1.73M*2
GLUCOSE SERPL-MCNC: 94 MG/DL (ref 74–99)
HCT VFR BLD AUTO: 45.4 % (ref 41–52)
HGB BLD-MCNC: 15 G/DL (ref 13.5–17.5)
IMM GRANULOCYTES # BLD AUTO: 0.06 X10*3/UL (ref 0–0.7)
IMM GRANULOCYTES NFR BLD AUTO: 0.5 % (ref 0–0.9)
LYMPHOCYTES # BLD AUTO: 3.68 X10*3/UL (ref 1.2–4.8)
LYMPHOCYTES NFR BLD AUTO: 30.1 %
MCH RBC QN AUTO: 31.8 PG (ref 26–34)
MCHC RBC AUTO-ENTMCNC: 33 G/DL (ref 32–36)
MCV RBC AUTO: 96 FL (ref 80–100)
MONOCYTES # BLD AUTO: 0.87 X10*3/UL (ref 0.1–1)
MONOCYTES NFR BLD AUTO: 7.1 %
NEUTROPHILS # BLD AUTO: 7.32 X10*3/UL (ref 1.2–7.7)
NEUTROPHILS NFR BLD AUTO: 59.8 %
NRBC BLD-RTO: 0 /100 WBCS (ref 0–0)
PLATELET # BLD AUTO: 435 X10*3/UL (ref 150–450)
PMV BLD AUTO: 8.9 FL (ref 7.5–11.5)
POTASSIUM SERPL-SCNC: 4.4 MMOL/L (ref 3.5–5.3)
PROT SERPL-MCNC: 6.5 G/DL (ref 6.4–8.2)
RBC # BLD AUTO: 4.71 X10*6/UL (ref 4.5–5.9)
SODIUM SERPL-SCNC: 137 MMOL/L (ref 136–145)
WBC # BLD AUTO: 12.2 X10*3/UL (ref 4.4–11.3)

## 2023-10-30 PROCEDURE — 36415 COLL VENOUS BLD VENIPUNCTURE: CPT

## 2023-10-30 PROCEDURE — 85025 COMPLETE CBC W/AUTO DIFF WBC: CPT

## 2023-10-30 PROCEDURE — 80053 COMPREHEN METABOLIC PANEL: CPT

## 2023-10-31 ENCOUNTER — OFFICE VISIT (OUTPATIENT)
Dept: UROLOGY | Facility: HOSPITAL | Age: 48
End: 2023-10-31
Payer: COMMERCIAL

## 2023-10-31 VITALS
RESPIRATION RATE: 20 BRPM | HEART RATE: 105 BPM | TEMPERATURE: 98.8 F | WEIGHT: 179.23 LBS | BODY MASS INDEX: 25.66 KG/M2 | OXYGEN SATURATION: 100 % | SYSTOLIC BLOOD PRESSURE: 128 MMHG | HEIGHT: 70 IN | DIASTOLIC BLOOD PRESSURE: 77 MMHG

## 2023-10-31 DIAGNOSIS — C61 MALIGNANT NEOPLASM OF PROSTATE (MULTI): Primary | ICD-10-CM

## 2023-10-31 DIAGNOSIS — N52.9 ERECTILE DISORDER: ICD-10-CM

## 2023-10-31 PROCEDURE — 99213 OFFICE O/P EST LOW 20 MIN: CPT | Performed by: STUDENT IN AN ORGANIZED HEALTH CARE EDUCATION/TRAINING PROGRAM

## 2023-10-31 PROCEDURE — 4004F PT TOBACCO SCREEN RCVD TLK: CPT | Performed by: STUDENT IN AN ORGANIZED HEALTH CARE EDUCATION/TRAINING PROGRAM

## 2023-10-31 PROCEDURE — 3078F DIAST BP <80 MM HG: CPT | Performed by: STUDENT IN AN ORGANIZED HEALTH CARE EDUCATION/TRAINING PROGRAM

## 2023-10-31 PROCEDURE — 3074F SYST BP LT 130 MM HG: CPT | Performed by: STUDENT IN AN ORGANIZED HEALTH CARE EDUCATION/TRAINING PROGRAM

## 2023-10-31 RX ORDER — SILDENAFIL 100 MG/1
TABLET, FILM COATED ORAL
Qty: 10 TABLET | Refills: 0 | Status: SHIPPED | OUTPATIENT
Start: 2023-10-31 | End: 2023-11-16 | Stop reason: SDUPTHER

## 2023-10-31 ASSESSMENT — PAIN SCALES - GENERAL: PAINLEVEL: 7

## 2023-11-01 ENCOUNTER — TELEPHONE (OUTPATIENT)
Dept: UROLOGY | Facility: HOSPITAL | Age: 48
End: 2023-11-01

## 2023-11-06 DIAGNOSIS — N52.9 ERECTILE DISORDER: ICD-10-CM

## 2023-11-06 RX ORDER — SILDENAFIL 100 MG/1
TABLET, FILM COATED ORAL
Qty: 10 TABLET | Refills: 0 | OUTPATIENT
Start: 2023-11-06

## 2023-11-15 DIAGNOSIS — F41.1 ANXIETY STATE: ICD-10-CM

## 2023-11-15 DIAGNOSIS — K21.00 GASTROESOPHAGEAL REFLUX DISEASE WITH ESOPHAGITIS WITHOUT HEMORRHAGE: ICD-10-CM

## 2023-11-16 ENCOUNTER — OFFICE VISIT (OUTPATIENT)
Dept: PRIMARY CARE | Facility: CLINIC | Age: 48
End: 2023-11-16
Payer: COMMERCIAL

## 2023-11-16 VITALS
SYSTOLIC BLOOD PRESSURE: 150 MMHG | DIASTOLIC BLOOD PRESSURE: 92 MMHG | OXYGEN SATURATION: 97 % | WEIGHT: 191 LBS | BODY MASS INDEX: 27.56 KG/M2 | TEMPERATURE: 97.7 F | HEART RATE: 125 BPM

## 2023-11-16 DIAGNOSIS — I10 PRIMARY HYPERTENSION: Primary | ICD-10-CM

## 2023-11-16 DIAGNOSIS — N52.9 ERECTILE DISORDER: ICD-10-CM

## 2023-11-16 DIAGNOSIS — F41.1 ANXIETY STATE: ICD-10-CM

## 2023-11-16 DIAGNOSIS — K21.00 GASTROESOPHAGEAL REFLUX DISEASE WITH ESOPHAGITIS WITHOUT HEMORRHAGE: ICD-10-CM

## 2023-11-16 DIAGNOSIS — M15.9 GENERALIZED OSTEOARTHRITIS: ICD-10-CM

## 2023-11-16 PROCEDURE — 3080F DIAST BP >= 90 MM HG: CPT | Performed by: FAMILY MEDICINE

## 2023-11-16 PROCEDURE — 99214 OFFICE O/P EST MOD 30 MIN: CPT | Performed by: FAMILY MEDICINE

## 2023-11-16 PROCEDURE — 3077F SYST BP >= 140 MM HG: CPT | Performed by: FAMILY MEDICINE

## 2023-11-16 PROCEDURE — 4004F PT TOBACCO SCREEN RCVD TLK: CPT | Performed by: FAMILY MEDICINE

## 2023-11-16 RX ORDER — MIRTAZAPINE 7.5 MG/1
7.5 TABLET, FILM COATED ORAL NIGHTLY
COMMUNITY
Start: 2023-11-14

## 2023-11-16 RX ORDER — CARIPRAZINE 1.5 MG/1
1.5 CAPSULE, GELATIN COATED ORAL DAILY
COMMUNITY
Start: 2023-11-07

## 2023-11-16 RX ORDER — AMLODIPINE BESYLATE 10 MG/1
10 TABLET ORAL DAILY
Qty: 90 TABLET | Refills: 1 | Status: SHIPPED | OUTPATIENT
Start: 2023-11-16 | End: 2024-02-27 | Stop reason: SDUPTHER

## 2023-11-16 RX ORDER — CLONAZEPAM 1 MG/1
1 TABLET ORAL NIGHTLY
Qty: 30 TABLET | Refills: 11 | OUTPATIENT
Start: 2023-11-16

## 2023-11-16 RX ORDER — NEBULIZER AND COMPRESSOR
EACH MISCELLANEOUS
Qty: 1 EACH | Refills: 0 | Status: SHIPPED | OUTPATIENT
Start: 2023-11-16

## 2023-11-16 RX ORDER — FAMOTIDINE 20 MG/1
20 TABLET, FILM COATED ORAL 2 TIMES DAILY
Qty: 60 TABLET | Refills: 11 | OUTPATIENT
Start: 2023-11-16

## 2023-11-16 RX ORDER — CLONAZEPAM 1 MG/1
1 TABLET ORAL NIGHTLY
Qty: 30 TABLET | Refills: 0 | Status: SHIPPED | OUTPATIENT
Start: 2023-11-16 | End: 2023-11-30 | Stop reason: SDUPTHER

## 2023-11-16 RX ORDER — PANTOPRAZOLE SODIUM 40 MG/1
40 TABLET, DELAYED RELEASE ORAL 2 TIMES DAILY
Qty: 180 TABLET | Refills: 1 | Status: SHIPPED | OUTPATIENT
Start: 2023-11-16 | End: 2024-02-27 | Stop reason: SDUPTHER

## 2023-11-16 RX ORDER — SILDENAFIL 100 MG/1
TABLET, FILM COATED ORAL
Qty: 10 TABLET | Refills: 3 | Status: SHIPPED | OUTPATIENT
Start: 2023-11-16 | End: 2023-11-30 | Stop reason: SDUPTHER

## 2023-11-16 RX ORDER — LIDOCAINE AND PRILOCAINE 25; 25 MG/G; MG/G
CREAM TOPICAL
Qty: 30 G | Refills: 2 | Status: SHIPPED | OUTPATIENT
Start: 2023-11-16

## 2023-11-16 ASSESSMENT — ENCOUNTER SYMPTOMS
INSOMNIA: 1
HYPERTENSION: 1

## 2023-11-16 NOTE — PROGRESS NOTES
Subjective   Patient ID: Shakila Shah is a 47 y.o. male who presents for Anxiety.  Hypertension    Insomnia      ED: Helped by Viagra    Prostate CA: Patient is following up with with urology    Anxiety: working with Psych who started him on Vraylar.    OA:  likes diclofenac better than Celebrex    HTN: not controlled, taking meds    Abdominal pain: Helped by the dicyclomine 3 times a day.    GERD: having upset stomach despite 20 mg PPI BID.      Current Outpatient Medications on File Prior to Visit   Medication Sig Dispense Refill    acetaminophen (Tylenol) 500 mg tablet TAKE TWO TABLETS BY MOUTH EVERY 8 HOURS AS NEEDED FOR PAIN (VIAL) 180 tablet 11    buPROPion XL (Wellbutrin XL) 300 mg 24 hr tablet TAKE ONE TABLET BY MOUTH DAILY AT 9AM 90 tablet 0    celecoxib (CeleBREX) 100 mg capsule Take 1 capsule (100 mg) by mouth 2 times a day.      dicyclomine (Bentyl) 10 mg capsule Take 1 capsule (10 mg) by mouth 3 times a day. 270 capsule 1    famotidine (Pepcid) 20 mg tablet TAKE ONE TABLET BY MOUTH TWICE DAILY @9AM & 5PM 60 tablet 4    losartan-hydrochlorothiazide (Hyzaar) 100-25 mg tablet Take 1 tablet by mouth once daily. 90 tablet 3    mirtazapine (Remeron) 7.5 mg tablet Take 1 tablet (7.5 mg) by mouth once daily at bedtime.      naltrexone (Depade) 50 mg tablet Take 1 tablet (50 mg) by mouth once daily.      nortriptyline (Pamelor) 25 mg capsule Take 1 capsule (25 mg) by mouth once daily at bedtime. Inc to 2 caps after 1 month 60 capsule 5    polyethylene glycol (Glycolax, Miralax) 17 gram/dose powder       tamsulosin (Flomax) 0.4 mg 24 hr capsule Take 1 capsule (0.4 mg) by mouth once daily. 90 capsule 1    Vraylar 1.5 mg capsule Take 1 capsule (1.5 mg) by mouth once daily.      WHEAT DEXTRIN ORAL Take by mouth. Mix 1 tablespoon in water or juice daily for regular bowel habits      [DISCONTINUED] clonazePAM (KlonoPIN) 1 mg tablet TAKE ONE TABLET (1MG) BY MOUTH ONCE DAILY AT BEDTIME 30 tablet 0    [DISCONTINUED]  diclofenac (Voltaren) 50 mg EC tablet Take 1 tablet (50 mg) by mouth once daily. Do not crush, chew, or split. 90 tablet 1    [DISCONTINUED] lidocaine-prilocaine (Emla) 2.5-2.5 % cream       [DISCONTINUED] omeprazole (PriLOSEC) 20 mg DR capsule Take 1 capsule (20 mg) by mouth 2 times a day before meals. 180 capsule 2    [DISCONTINUED] sildenafil (Viagra) 100 mg tablet TAKE ONE TABLET BY MOUTH DAILY AS NEEDED FOR COITUS 10 tablet 0    buPROPion XL (Wellbutrin XL) 150 mg 24 hr tablet Take 2 tablets (300 mg) by mouth once daily in the morning.      [DISCONTINUED] traZODone (Desyrel) 50 mg tablet Take 1 tablet (50 mg) by mouth once daily at bedtime. (Patient not taking: Reported on 11/16/2023) 90 tablet 1     No current facility-administered medications on file prior to visit.        Vitals:    11/16/23 1507   BP: (!) 150/92   Pulse: (!) 125   Temp: 36.5 °C (97.7 °F)   SpO2: 97%       Review of Systems   All other systems reviewed and are negative.      Objective     Physical Exam  Constitutional:       Appearance: Normal appearance. He is well-developed.   HENT:      Head: Atraumatic.   Cardiovascular:      Rate and Rhythm: Normal rate and regular rhythm.      Heart sounds: Normal heart sounds. No murmur heard.  Pulmonary:      Effort: Pulmonary effort is normal.      Breath sounds: Normal breath sounds.   Abdominal:      General: Bowel sounds are normal.      Palpations: Abdomen is soft.   Skin:     General: Skin is warm.   Neurological:      General: No focal deficit present.      Mental Status: He is alert.   Psychiatric:         Mood and Affect: Mood normal.         Lab on 10/30/2023   Component Date Value Ref Range Status    Glucose 10/30/2023 94  74 - 99 mg/dL Final    Sodium 10/30/2023 137  136 - 145 mmol/L Final    Potassium 10/30/2023 4.4  3.5 - 5.3 mmol/L Final    Chloride 10/30/2023 106  98 - 107 mmol/L Final    Bicarbonate 10/30/2023 23  21 - 32 mmol/L Final    Anion Gap 10/30/2023 12  10 - 20 mmol/L Final     Urea Nitrogen 10/30/2023 10  6 - 23 mg/dL Final    Creatinine 10/30/2023 1.16  0.50 - 1.30 mg/dL Final    eGFR 10/30/2023 79  >60 mL/min/1.73m*2 Final    Calculations of estimated GFR are performed using the 2021 CKD-EPI Study Refit equation without the race variable for the IDMS-Traceable creatinine methods.  https://jasn.asnjournals.org/content/early/2021/09/22/ASN.5692604344    Calcium 10/30/2023 9.1  8.6 - 10.3 mg/dL Final    Albumin 10/30/2023 4.1  3.4 - 5.0 g/dL Final    Alkaline Phosphatase 10/30/2023 70  33 - 120 U/L Final    Total Protein 10/30/2023 6.5  6.4 - 8.2 g/dL Final    AST 10/30/2023 15  9 - 39 U/L Final    Bilirubin, Total 10/30/2023 0.5  0.0 - 1.2 mg/dL Final    ALT 10/30/2023 25  10 - 52 U/L Final    Patients treated with Sulfasalazine may generate falsely decreased results for ALT.    WBC 10/30/2023 12.2 (H)  4.4 - 11.3 x10*3/uL Final    nRBC 10/30/2023 0.0  0.0 - 0.0 /100 WBCs Final    RBC 10/30/2023 4.71  4.50 - 5.90 x10*6/uL Final    Hemoglobin 10/30/2023 15.0  13.5 - 17.5 g/dL Final    Hematocrit 10/30/2023 45.4  41.0 - 52.0 % Final    MCV 10/30/2023 96  80 - 100 fL Final    MCH 10/30/2023 31.8  26.0 - 34.0 pg Final    MCHC 10/30/2023 33.0  32.0 - 36.0 g/dL Final    RDW 10/30/2023 14.1  11.5 - 14.5 % Final    Platelets 10/30/2023 435  150 - 450 x10*3/uL Final    MPV 10/30/2023 8.9  7.5 - 11.5 fL Final    Neutrophils % 10/30/2023 59.8  40.0 - 80.0 % Final    Immature Granulocytes %, Automated 10/30/2023 0.5  0.0 - 0.9 % Final    Immature Granulocyte Count (IG) includes promyelocytes, myelocytes and metamyelocytes but does not include bands. Percent differential counts (%) should be interpreted in the context of the absolute cell counts (cells/UL).    Lymphocytes % 10/30/2023 30.1  13.0 - 44.0 % Final    Monocytes % 10/30/2023 7.1  2.0 - 10.0 % Final    Eosinophils % 10/30/2023 1.7  0.0 - 6.0 % Final    Basophils % 10/30/2023 0.8  0.0 - 2.0 % Final    Neutrophils Absolute 10/30/2023 7.32   1.20 - 7.70 x10*3/uL Final    Percent differential counts (%) should be interpreted in the context of the absolute cell counts (cells/uL).    Immature Granulocytes Absolute, Au* 10/30/2023 0.06  0.00 - 0.70 x10*3/uL Final    Lymphocytes Absolute 10/30/2023 3.68  1.20 - 4.80 x10*3/uL Final    Monocytes Absolute 10/30/2023 0.87  0.10 - 1.00 x10*3/uL Final    Eosinophils Absolute 10/30/2023 0.21  0.00 - 0.70 x10*3/uL Final    Basophils Absolute 10/30/2023 0.10  0.00 - 0.10 x10*3/uL Final       Assessment/Plan   Problem List Items Addressed This Visit       Anxiety state    Relevant Medications    clonazePAM (KlonoPIN) 1 mg tablet    Generalized osteoarthritis    Relevant Medications    lidocaine-prilocaine (Emla) 2.5-2.5 % cream     Other Visit Diagnoses       Primary hypertension    -  Primary    Relevant Medications    amLODIPine (Norvasc) 10 mg tablet    miscellaneous medical supply (Blood Pressure Cuff) misc    Erectile disorder        Relevant Medications    sildenafil (Viagra) 100 mg tablet    Gastroesophageal reflux disease with esophagitis without hemorrhage        Relevant Medications    pantoprazole (ProtoNix) 40 mg EC tablet          Wrote for klonpin wean.  Pantoprazole BID.  Check BP's at home and call if high. Starting amlodpine.    Fu in 3 mo

## 2023-11-30 DIAGNOSIS — N52.9 ERECTILE DISORDER: ICD-10-CM

## 2023-11-30 DIAGNOSIS — K21.00 GASTROESOPHAGEAL REFLUX DISEASE WITH ESOPHAGITIS WITHOUT HEMORRHAGE: ICD-10-CM

## 2023-11-30 DIAGNOSIS — F41.1 ANXIETY STATE: ICD-10-CM

## 2023-11-30 RX ORDER — CLONAZEPAM 1 MG/1
1 TABLET ORAL NIGHTLY
Qty: 30 TABLET | Refills: 0 | Status: SHIPPED | OUTPATIENT
Start: 2023-11-30 | End: 2023-12-20 | Stop reason: SDUPTHER

## 2023-11-30 RX ORDER — SILDENAFIL 100 MG/1
TABLET, FILM COATED ORAL
Qty: 10 TABLET | Refills: 3 | Status: SHIPPED | OUTPATIENT
Start: 2023-11-30 | End: 2024-02-15

## 2023-11-30 RX ORDER — FAMOTIDINE 20 MG/1
20 TABLET, FILM COATED ORAL 2 TIMES DAILY
Qty: 60 TABLET | Refills: 4 | Status: SHIPPED | OUTPATIENT
Start: 2023-11-30 | End: 2024-04-04 | Stop reason: WASHOUT

## 2023-11-30 NOTE — TELEPHONE ENCOUNTER
Pharmacy called Rx line asking for refills on pended medications, pt would be out before 2/19/24 appt. Please advise, AM

## 2023-12-05 DIAGNOSIS — N52.9 ERECTILE DISORDER: ICD-10-CM

## 2023-12-07 ENCOUNTER — PREP FOR PROCEDURE (OUTPATIENT)
Dept: OTOLARYNGOLOGY | Facility: HOSPITAL | Age: 48
End: 2023-12-07

## 2023-12-07 ENCOUNTER — OFFICE VISIT (OUTPATIENT)
Dept: UROLOGY | Facility: HOSPITAL | Age: 48
End: 2023-12-07
Payer: COMMERCIAL

## 2023-12-07 DIAGNOSIS — N52.31 ERECTILE DYSFUNCTION AFTER RADICAL PROSTATECTOMY: Primary | ICD-10-CM

## 2023-12-07 DIAGNOSIS — N52.31 ERECTILE DYSFUNCTION AFTER RADICAL PROSTATECTOMY: ICD-10-CM

## 2023-12-07 DIAGNOSIS — C61 MALIGNANT NEOPLASM OF PROSTATE (MULTI): Primary | ICD-10-CM

## 2023-12-07 DIAGNOSIS — Z90.79 HISTORY OF ROBOT-ASSISTED LAPAROSCOPIC RADICAL PROSTATECTOMY: ICD-10-CM

## 2023-12-07 PROCEDURE — 4004F PT TOBACCO SCREEN RCVD TLK: CPT | Performed by: UROLOGY

## 2023-12-07 PROCEDURE — 99214 OFFICE O/P EST MOD 30 MIN: CPT | Performed by: UROLOGY

## 2023-12-07 RX ORDER — ACETAMINOPHEN 325 MG/1
650 TABLET ORAL ONCE
Status: CANCELLED | OUTPATIENT
Start: 2023-12-07 | End: 2023-12-07

## 2023-12-07 RX ORDER — SODIUM CHLORIDE, SODIUM LACTATE, POTASSIUM CHLORIDE, CALCIUM CHLORIDE 600; 310; 30; 20 MG/100ML; MG/100ML; MG/100ML; MG/100ML
100 INJECTION, SOLUTION INTRAVENOUS CONTINUOUS
Status: CANCELLED | OUTPATIENT
Start: 2023-12-07

## 2023-12-07 RX ORDER — FLUCONAZOLE 2 MG/ML
200 INJECTION, SOLUTION INTRAVENOUS ONCE
Status: CANCELLED | OUTPATIENT
Start: 2023-12-07 | End: 2023-12-07

## 2023-12-07 RX ORDER — GABAPENTIN 100 MG/1
600 CAPSULE ORAL ONCE
Status: CANCELLED | OUTPATIENT
Start: 2023-12-07 | End: 2023-12-07

## 2023-12-07 RX ORDER — CHLORHEXIDINE GLUCONATE 40 MG/ML
SOLUTION TOPICAL DAILY PRN
Status: CANCELLED | OUTPATIENT
Start: 2023-12-07

## 2023-12-07 NOTE — PROGRESS NOTES
FUV    Last seen -      HISTORY OF PRESENT ILLNESS:   Shakila Shah is a 48 y.o. male who is being seen today for fuv.      GGG2 PCa s/p RALP 11/2/23     PSA:<0.10 last check     BETI: He is not wearing pads or Depends. No leakage.      ED: He is not able to have erections sufficient for intercourse. He is taking daily and OD dose of Cialis and Viagra with no improvement.  Tried AKIRA    PAST MEDICAL HISTORY:  No past medical history on file.    PAST SURGICAL HISTORY:  No past surgical history on file.     ALLERGIES:   No Known Allergies     MEDICATIONS:   Current Outpatient Medications   Medication Instructions    acetaminophen (Tylenol) 500 mg tablet TAKE TWO TABLETS BY MOUTH EVERY 8 HOURS AS NEEDED FOR PAIN (VIAL)    amLODIPine (NORVASC) 10 mg, oral, Daily    buPROPion XL (WELLBUTRIN XL) 300 mg, oral, Every morning    buPROPion XL (WELLBUTRIN XL) 300 mg, oral, Daily, Take at 9 AM    celecoxib (CeleBREX) 100 mg capsule 1 capsule, oral, 2 times daily    clonazePAM (KLONOPIN) 1 mg, oral, Nightly    dicyclomine (BENTYL) 10 mg, oral, 3 times daily    famotidine (PEPCID) 20 mg, oral, 2 times daily    lidocaine-prilocaine (Emla) 2.5-2.5 % cream To be applied BID prn    losartan-hydrochlorothiazide (Hyzaar) 100-25 mg tablet 1 tablet, oral, Daily    mirtazapine (REMERON) 7.5 mg, oral, Nightly    miscellaneous medical supply (Blood Pressure Cuff) misc To be used daily    naltrexone (DEPADE) 50 mg, oral, Daily    nortriptyline (PAMELOR) 25 mg, oral, Nightly, Inc to 2 caps after 1 month    pantoprazole (PROTONIX) 40 mg, oral, 2 times daily, Do not crush, chew, or split.    polyethylene glycol (Glycolax, Miralax) 17 gram/dose powder     sildenafil (Viagra) 100 mg tablet TAKE ONE TABLET BY MOUTH DAILY AS NEEDED FOR COITUS    tamsulosin (FLOMAX) 0.4 mg, oral, Daily    Vraylar 1.5 mg, oral, Daily    WHEAT DEXTRIN ORAL oral, Mix 1 tablespoon in water or juice daily for regular bowel habits        PHYSICAL EXAM:  There were no  vitals taken for this visit.  Constitutional: Patient appears well-developed and well-nourished. No distress.    Pulmonary/Chest: Effort normal. No respiratory distress.   Abdominal: Soft, ND NT  : Circumcised phallus, no lesions or plaques  Musculoskeletal: Normal range of motion.    Neurological: Alert and oriented to person, place, and time.  Psychiatric: Normal mood and affect. Behavior is normal. Thought content normal.      Labs  Lab Results   Component Value Date    TESTOSTERONE 295 03/16/2022     Lab Results   Component Value Date    PSA <0.01 (LL) 03/16/2023     Lab Results   Component Value Date    GFRMALE 74 03/16/2023     Lab Results   Component Value Date    CREATININE 1.16 10/30/2023     Lab Results   Component Value Date    CHOL 148 03/16/2022     Lab Results   Component Value Date    HDL 38.4 (A) 03/16/2022     Lab Results   Component Value Date    CHHDL 3.9 03/16/2022     Lab Results   Component Value Date    LDLF 90 03/16/2022     Lab Results   Component Value Date    VLDL 19 03/16/2022     Lab Results   Component Value Date    TRIG 97 03/16/2022     Lab Results   Component Value Date    HCT 45.4 10/30/2023       Assessment:      1. Malignant neoplasm of prostate (CMS/HCC)        2. History of robot-assisted laparoscopic radical prostatectomy        3. Erectile dysfunction after radical prostatectomy             Plan:   Penile Prosthesis  The patient has erectile dysfunction refractory to conservative management. Etiology of his erectile dysfunction is post prostatectomy.  Patient has failed PDE5i and ICI.      We discussed that further treatment would require penile prosthesis surgery.  We discussed that this artificial device would be placed into the penis and disrupt the tissue that creates natural erections so he can never go back to another kind of treatment for erections.  His penis may look and feel different, even with a completely deflated prosthesis.  We discussed that this procedure  will not make his penis longer, and in fact he may look smaller then his previous natural erections.  We may have the patient use the vacuum erectile device to increase blood flow to his penis, if he is amenable.      We discussed the differences between the malleable (semirigid) and inflatable (hydraulic) types of penile prosthesis.  We discussed that the inflatable prosthesis would also include a pump in the scrotum and a reservoir that would be placed in the pelvis or abdominal wall.  We discussed that sometimes placement of the reservoir requires a second incision.  Models were used to show the prosthesis and patient was able to work with them.    Risks of the surgery were discussed, which include but are not limited to pain, bleeding, erosion of the prosthesis, mechanical failure requiring surgical replacement, urinary retention, infection of the prosthesis requiring surgical removal and/or replacement, and damage to surrounding structures including the penis, urethra, bladder, and pelvic structures.      After all of the patient's questions were satisfactorily answered, he expressed understanding of the risks of surgery and wishes to proceed.  No barriers to learning were identified.    Patient ill be scheduled in a timely fashion.  He will see pre-admission testing and obtain any clearance if necessary.     Stressed smoking cessation

## 2023-12-12 ENCOUNTER — HOSPITAL ENCOUNTER (OUTPATIENT)
Facility: HOSPITAL | Age: 48
Setting detail: OUTPATIENT SURGERY
End: 2023-12-12
Attending: UROLOGY | Admitting: UROLOGY
Payer: COMMERCIAL

## 2023-12-13 ENCOUNTER — TELEPHONE (OUTPATIENT)
Dept: UROLOGY | Facility: HOSPITAL | Age: 48
End: 2023-12-13
Payer: COMMERCIAL

## 2023-12-15 ENCOUNTER — PROCEDURE VISIT (OUTPATIENT)
Dept: UROLOGY | Facility: HOSPITAL | Age: 48
End: 2023-12-15
Payer: COMMERCIAL

## 2023-12-15 VITALS — HEART RATE: 98 BPM | SYSTOLIC BLOOD PRESSURE: 132 MMHG | DIASTOLIC BLOOD PRESSURE: 81 MMHG

## 2023-12-15 DIAGNOSIS — N52.31 ERECTILE DYSFUNCTION AFTER RADICAL PROSTATECTOMY: Primary | ICD-10-CM

## 2023-12-15 PROCEDURE — 54235 NJX CORPORA CAVERNOSA RX AGT: CPT | Performed by: UROLOGY

## 2023-12-15 PROCEDURE — 99214 OFFICE O/P EST MOD 30 MIN: CPT | Performed by: UROLOGY

## 2023-12-15 PROCEDURE — 93980 PENILE VASCULAR STUDY: CPT | Performed by: UROLOGY

## 2023-12-15 ASSESSMENT — PAIN SCALES - GENERAL: PAINLEVEL: 0-NO PAIN

## 2023-12-15 NOTE — PROGRESS NOTES
Shakila Shah is a 48 y.o. male who is being seen today for doppler     GGG2 PCa s/p RALP 11/2/22     PSA:<0.10 last check     BETI: He is not wearing pads or Depends. No leakage.      ED: He is not able to have erections sufficient for intercourse. He is taking daily and OD dose of Cialis and Viagra with no improvement.  Tried AKIRA     Penile doppler study    ICI injection of 10U Trimix (30/1/10)    Anatomy scan:  No plaques or calcifications.  No penile curvature.    Post Injection flow at 10min    Right Side: Peak Systolic:  21.0 cm/s   End Diastolic:  9.57 cm/s RI: 0.55  Left SIde: Peak Systolic:  25.2 cm/s   End Diastolic 9.57 cm/s RI: 0.62    Impression: Veno-occlusive disease    Pt tolerated procedure well, pt instructed to proceed to ER if he develops a persistent erection lasting longer than 4 hours.  Pt demonstrated understanding of importance of a priapism.     Dipso:    Will order Trimix#5    Intracavernous injection    The patient was educated on the safe and correct use of intracavernous injection therapy.  We reviewed the type of injection medication that we will use.  Additionally, we discussed self-titration of the dosage of medication within the specified range.  This medication should not be used more than once in a 24-hour period. We reviewed the potential risks and side effects of intracavernous injection therapy, including but not limited to: local site pain, bleeding or hematoma at injection site, scarring at the injection site resulting in penile curvature, hypotension, syncope, or prolonged erection.  We discussed that this modality is contraindicated in patients with a history of priapism, severe coagulopathy, unstable cardiovascular disease, and use on monoamine oxidase inhibitors.  We discussed that if he experiences erection lasting longer than four hours, he needs to seek immediate treatment at the emergency department as the longer he waits, the more damage that is done to the penile  tissue. No barriers to learning were identified.  After all of the patient's questions were satisfactorily answered, he expressed understanding of the risks of therapy.     Fu in

## 2023-12-20 ENCOUNTER — OFFICE VISIT (OUTPATIENT)
Dept: PRIMARY CARE | Facility: CLINIC | Age: 48
End: 2023-12-20
Payer: COMMERCIAL

## 2023-12-20 VITALS
TEMPERATURE: 98.7 F | OXYGEN SATURATION: 100 % | BODY MASS INDEX: 28.57 KG/M2 | WEIGHT: 198 LBS | HEART RATE: 99 BPM | DIASTOLIC BLOOD PRESSURE: 88 MMHG | SYSTOLIC BLOOD PRESSURE: 138 MMHG

## 2023-12-20 DIAGNOSIS — C61 MALIGNANT NEOPLASM OF PROSTATE (MULTI): ICD-10-CM

## 2023-12-20 DIAGNOSIS — F33.1 MODERATE RECURRENT MAJOR DEPRESSION (MULTI): ICD-10-CM

## 2023-12-20 DIAGNOSIS — F41.1 ANXIETY STATE: ICD-10-CM

## 2023-12-20 DIAGNOSIS — K21.00 GASTROESOPHAGEAL REFLUX DISEASE WITH ESOPHAGITIS WITHOUT HEMORRHAGE: ICD-10-CM

## 2023-12-20 DIAGNOSIS — Z00.00 ROUTINE ADULT HEALTH MAINTENANCE: ICD-10-CM

## 2023-12-20 DIAGNOSIS — J30.9 CHRONIC ALLERGIC RHINITIS: ICD-10-CM

## 2023-12-20 DIAGNOSIS — I10 PRIMARY HYPERTENSION: ICD-10-CM

## 2023-12-20 DIAGNOSIS — M48.02 SPINAL STENOSIS OF CERVICAL REGION: Primary | ICD-10-CM

## 2023-12-20 DIAGNOSIS — M15.9 GENERALIZED OSTEOARTHRITIS: ICD-10-CM

## 2023-12-20 DIAGNOSIS — M15.9 GENERALIZED OA: ICD-10-CM

## 2023-12-20 DIAGNOSIS — Z12.5 SCREENING FOR PROSTATE CANCER: ICD-10-CM

## 2023-12-20 PROCEDURE — 4004F PT TOBACCO SCREEN RCVD TLK: CPT | Performed by: FAMILY MEDICINE

## 2023-12-20 PROCEDURE — 99214 OFFICE O/P EST MOD 30 MIN: CPT | Performed by: FAMILY MEDICINE

## 2023-12-20 PROCEDURE — 99396 PREV VISIT EST AGE 40-64: CPT | Performed by: FAMILY MEDICINE

## 2023-12-20 PROCEDURE — 3075F SYST BP GE 130 - 139MM HG: CPT | Performed by: FAMILY MEDICINE

## 2023-12-20 PROCEDURE — 3079F DIAST BP 80-89 MM HG: CPT | Performed by: FAMILY MEDICINE

## 2023-12-20 RX ORDER — CLONAZEPAM 1 MG/1
1 TABLET ORAL NIGHTLY
Qty: 90 TABLET | Refills: 0 | Status: SHIPPED | OUTPATIENT
Start: 2023-12-20 | End: 2024-02-27 | Stop reason: SDUPTHER

## 2023-12-20 RX ORDER — FLUTICASONE PROPIONATE 50 MCG
1 SPRAY, SUSPENSION (ML) NASAL DAILY
Qty: 16 G | Refills: 5 | Status: SHIPPED | OUTPATIENT
Start: 2023-12-20 | End: 2024-12-19

## 2023-12-20 RX ORDER — CLONAZEPAM 1 MG/1
TABLET ORAL
Qty: 30 TABLET | Refills: 11 | OUTPATIENT
Start: 2023-12-20

## 2023-12-20 ASSESSMENT — ENCOUNTER SYMPTOMS
INSOMNIA: 1
HYPERTENSION: 1

## 2023-12-20 NOTE — PROGRESS NOTES
Subjective   Patient ID: Shakila Shah is a 48 y.o. male who presents for Annual Exam (Discuss disability).  Hypertension    Insomnia    ED: Helped by Viagra    Prostate CA: Patient is following up with with urology    Anxiety: working with Psych who started him on Vraylar.    OA:  hurts in his neck, lumbar spine, elbows, feet. Knees.  Really his whole body hurts.  Has tried medical MJ in the past and it helped him deal with pain at times but hasn't used it recently.  Unable to walk more than a block without a cane.  When lifting objects he gets shooting pain down his arms.    HTN: not controlled, taking meds    Abdominal pain: Helped by the dicyclomine 3 times a day.    GERD: having upset stomach despite 20 mg PPI BID.      Current Outpatient Medications on File Prior to Visit   Medication Sig Dispense Refill    acetaminophen (Tylenol) 500 mg tablet TAKE TWO TABLETS BY MOUTH EVERY 8 HOURS AS NEEDED FOR PAIN (VIAL) 180 tablet 11    amLODIPine (Norvasc) 10 mg tablet Take 1 tablet (10 mg) by mouth once daily. 90 tablet 1    buPROPion XL (Wellbutrin XL) 150 mg 24 hr tablet Take 2 tablets (300 mg) by mouth once daily in the morning.      buPROPion XL (Wellbutrin XL) 300 mg 24 hr tablet TAKE ONE TABLET BY MOUTH DAILY AT 9AM 90 tablet 0    celecoxib (CeleBREX) 100 mg capsule Take 1 capsule (100 mg) by mouth 2 times a day.      clonazePAM (KlonoPIN) 1 mg tablet Take 1 tablet (1 mg) by mouth once daily at bedtime. 30 tablet 0    dicyclomine (Bentyl) 10 mg capsule Take 1 capsule (10 mg) by mouth 3 times a day. 270 capsule 1    famotidine (Pepcid) 20 mg tablet Take 1 tablet (20 mg) by mouth 2 times a day. 60 tablet 4    lidocaine-prilocaine (Emla) 2.5-2.5 % cream To be applied BID prn 30 g 2    losartan-hydrochlorothiazide (Hyzaar) 100-25 mg tablet Take 1 tablet by mouth once daily. 90 tablet 3    mirtazapine (Remeron) 7.5 mg tablet Take 1 tablet (7.5 mg) by mouth once daily at bedtime.      miscellaneous medical supply  (Blood Pressure Cuff) misc To be used daily 1 each 0    naltrexone (Depade) 50 mg tablet Take 1 tablet (50 mg) by mouth once daily.      nortriptyline (Pamelor) 25 mg capsule Take 1 capsule (25 mg) by mouth once daily at bedtime. Inc to 2 caps after 1 month 60 capsule 5    pantoprazole (ProtoNix) 40 mg EC tablet Take 1 tablet (40 mg) by mouth 2 times a day. Do not crush, chew, or split. 180 tablet 1    polyethylene glycol (Glycolax, Miralax) 17 gram/dose powder       sildenafil (Viagra) 100 mg tablet TAKE ONE TABLET BY MOUTH DAILY AS NEEDED FOR COITUS 10 tablet 3    tamsulosin (Flomax) 0.4 mg 24 hr capsule Take 1 capsule (0.4 mg) by mouth once daily. 90 capsule 1    Vraylar 1.5 mg capsule Take 1 capsule (1.5 mg) by mouth once daily.      WHEAT DEXTRIN ORAL Take by mouth. Mix 1 tablespoon in water or juice daily for regular bowel habits       No current facility-administered medications on file prior to visit.        Vitals:    12/20/23 1127   BP: 138/88   Pulse: 99   Temp: 37.1 °C (98.7 °F)   SpO2: 100%       Review of Systems   All other systems reviewed and are negative.      Objective     Physical Exam  Constitutional:       Appearance: Normal appearance. He is well-developed.   HENT:      Head: Atraumatic.   Cardiovascular:      Rate and Rhythm: Normal rate and regular rhythm.      Heart sounds: Normal heart sounds. No murmur heard.  Pulmonary:      Effort: Pulmonary effort is normal.      Breath sounds: Normal breath sounds.   Abdominal:      General: Bowel sounds are normal.      Palpations: Abdomen is soft.   Skin:     General: Skin is warm.   Neurological:      General: No focal deficit present.      Mental Status: He is alert.   Psychiatric:         Mood and Affect: Mood normal.         Lab on 10/30/2023   Component Date Value Ref Range Status    Glucose 10/30/2023 94  74 - 99 mg/dL Final    Sodium 10/30/2023 137  136 - 145 mmol/L Final    Potassium 10/30/2023 4.4  3.5 - 5.3 mmol/L Final    Chloride  10/30/2023 106  98 - 107 mmol/L Final    Bicarbonate 10/30/2023 23  21 - 32 mmol/L Final    Anion Gap 10/30/2023 12  10 - 20 mmol/L Final    Urea Nitrogen 10/30/2023 10  6 - 23 mg/dL Final    Creatinine 10/30/2023 1.16  0.50 - 1.30 mg/dL Final    eGFR 10/30/2023 79  >60 mL/min/1.73m*2 Final    Calculations of estimated GFR are performed using the 2021 CKD-EPI Study Refit equation without the race variable for the IDMS-Traceable creatinine methods.  https://jasn.asnjournals.org/content/early/2021/09/22/ASN.6504465835    Calcium 10/30/2023 9.1  8.6 - 10.3 mg/dL Final    Albumin 10/30/2023 4.1  3.4 - 5.0 g/dL Final    Alkaline Phosphatase 10/30/2023 70  33 - 120 U/L Final    Total Protein 10/30/2023 6.5  6.4 - 8.2 g/dL Final    AST 10/30/2023 15  9 - 39 U/L Final    Bilirubin, Total 10/30/2023 0.5  0.0 - 1.2 mg/dL Final    ALT 10/30/2023 25  10 - 52 U/L Final    Patients treated with Sulfasalazine may generate falsely decreased results for ALT.    WBC 10/30/2023 12.2 (H)  4.4 - 11.3 x10*3/uL Final    nRBC 10/30/2023 0.0  0.0 - 0.0 /100 WBCs Final    RBC 10/30/2023 4.71  4.50 - 5.90 x10*6/uL Final    Hemoglobin 10/30/2023 15.0  13.5 - 17.5 g/dL Final    Hematocrit 10/30/2023 45.4  41.0 - 52.0 % Final    MCV 10/30/2023 96  80 - 100 fL Final    MCH 10/30/2023 31.8  26.0 - 34.0 pg Final    MCHC 10/30/2023 33.0  32.0 - 36.0 g/dL Final    RDW 10/30/2023 14.1  11.5 - 14.5 % Final    Platelets 10/30/2023 435  150 - 450 x10*3/uL Final    MPV 10/30/2023 8.9  7.5 - 11.5 fL Final    Neutrophils % 10/30/2023 59.8  40.0 - 80.0 % Final    Immature Granulocytes %, Automated 10/30/2023 0.5  0.0 - 0.9 % Final    Immature Granulocyte Count (IG) includes promyelocytes, myelocytes and metamyelocytes but does not include bands. Percent differential counts (%) should be interpreted in the context of the absolute cell counts (cells/UL).    Lymphocytes % 10/30/2023 30.1  13.0 - 44.0 % Final    Monocytes % 10/30/2023 7.1  2.0 - 10.0 % Final     Eosinophils % 10/30/2023 1.7  0.0 - 6.0 % Final    Basophils % 10/30/2023 0.8  0.0 - 2.0 % Final    Neutrophils Absolute 10/30/2023 7.32  1.20 - 7.70 x10*3/uL Final    Percent differential counts (%) should be interpreted in the context of the absolute cell counts (cells/uL).    Immature Granulocytes Absolute, Au* 10/30/2023 0.06  0.00 - 0.70 x10*3/uL Final    Lymphocytes Absolute 10/30/2023 3.68  1.20 - 4.80 x10*3/uL Final    Monocytes Absolute 10/30/2023 0.87  0.10 - 1.00 x10*3/uL Final    Eosinophils Absolute 10/30/2023 0.21  0.00 - 0.70 x10*3/uL Final    Basophils Absolute 10/30/2023 0.10  0.00 - 0.10 x10*3/uL Final       Assessment/Plan   Problem List Items Addressed This Visit       Anxiety state    Generalized osteoarthritis    Malignant neoplasm of prostate (CMS/HCC)    Moderate recurrent major depression (CMS/HCC)     Other Visit Diagnoses       Spinal stenosis of cervical region    -  Primary    Relevant Orders    Referral to Orthopaedic Surgery    Routine adult health maintenance        Primary hypertension        Gastroesophageal reflux disease with esophagitis without hemorrhage        Generalized OA              Ref to ortho spine to see if anything can be offered for his spinal stenosis.  Filling out disability paperwork for patient.  Wrote for klonopin for sleep.  Fu in 3 mo.  Might consider talking to medical marijuana doctor to get approval for card.

## 2023-12-21 DIAGNOSIS — F41.1 ANXIETY STATE: ICD-10-CM

## 2023-12-21 RX ORDER — BUPROPION HYDROCHLORIDE 300 MG/1
300 TABLET ORAL DAILY
Qty: 90 TABLET | Refills: 0 | Status: SHIPPED | OUTPATIENT
Start: 2023-12-21 | End: 2024-02-27 | Stop reason: SDUPTHER

## 2024-01-09 ENCOUNTER — CLINICAL SUPPORT (OUTPATIENT)
Dept: RADIOLOGY | Facility: CLINIC | Age: 49
End: 2024-01-09
Payer: COMMERCIAL

## 2024-01-09 ENCOUNTER — LAB (OUTPATIENT)
Dept: LAB | Facility: LAB | Age: 49
End: 2024-01-09
Payer: COMMERCIAL

## 2024-01-09 DIAGNOSIS — C61 MALIGNANT NEOPLASM OF PROSTATE (MULTI): ICD-10-CM

## 2024-01-09 DIAGNOSIS — N52.31 ERECTILE DYSFUNCTION AFTER RADICAL PROSTATECTOMY: ICD-10-CM

## 2024-01-09 LAB
ALBUMIN SERPL BCP-MCNC: 4.2 G/DL (ref 3.4–5)
ALP SERPL-CCNC: 61 U/L (ref 33–120)
ALT SERPL W P-5'-P-CCNC: 38 U/L (ref 10–52)
ANION GAP SERPL CALC-SCNC: 10 MMOL/L (ref 10–20)
APTT PPP: 35 SECONDS (ref 27–38)
AST SERPL W P-5'-P-CCNC: 23 U/L (ref 9–39)
BILIRUB SERPL-MCNC: 0.7 MG/DL (ref 0–1.2)
BUN SERPL-MCNC: 12 MG/DL (ref 6–23)
CALCIUM SERPL-MCNC: 9.2 MG/DL (ref 8.6–10.3)
CHLORIDE SERPL-SCNC: 106 MMOL/L (ref 98–107)
CO2 SERPL-SCNC: 27 MMOL/L (ref 21–32)
CREAT SERPL-MCNC: 1.08 MG/DL (ref 0.5–1.3)
EGFRCR SERPLBLD CKD-EPI 2021: 86 ML/MIN/1.73M*2
GLUCOSE SERPL-MCNC: 90 MG/DL (ref 74–99)
INR PPP: 1 (ref 0.9–1.1)
POTASSIUM SERPL-SCNC: 3.9 MMOL/L (ref 3.5–5.3)
PROT SERPL-MCNC: 7 G/DL (ref 6.4–8.2)
PROTHROMBIN TIME: 11 SECONDS (ref 9.8–12.8)
PSA SERPL-MCNC: 0.01 NG/ML
SODIUM SERPL-SCNC: 139 MMOL/L (ref 136–145)

## 2024-01-09 PROCEDURE — 85610 PROTHROMBIN TIME: CPT

## 2024-01-09 PROCEDURE — 36415 COLL VENOUS BLD VENIPUNCTURE: CPT

## 2024-01-09 PROCEDURE — 93005 ELECTROCARDIOGRAM TRACING: CPT

## 2024-01-09 PROCEDURE — 84153 ASSAY OF PSA TOTAL: CPT

## 2024-01-09 PROCEDURE — 85730 THROMBOPLASTIN TIME PARTIAL: CPT

## 2024-01-09 PROCEDURE — 80053 COMPREHEN METABOLIC PANEL: CPT

## 2024-01-14 ENCOUNTER — HOSPITAL ENCOUNTER (OUTPATIENT)
Dept: RADIOLOGY | Facility: EXTERNAL LOCATION | Age: 49
Discharge: HOME | End: 2024-01-14

## 2024-01-14 DIAGNOSIS — M25.511 RIGHT SHOULDER PAIN, UNSPECIFIED CHRONICITY: ICD-10-CM

## 2024-01-15 ENCOUNTER — APPOINTMENT (OUTPATIENT)
Dept: PREADMISSION TESTING | Facility: HOSPITAL | Age: 49
End: 2024-01-15
Payer: COMMERCIAL

## 2024-01-16 ENCOUNTER — PRE-ADMISSION TESTING (OUTPATIENT)
Dept: PREADMISSION TESTING | Facility: HOSPITAL | Age: 49
End: 2024-01-16
Payer: COMMERCIAL

## 2024-01-25 ENCOUNTER — TELEPHONE (OUTPATIENT)
Dept: PRIMARY CARE | Facility: CLINIC | Age: 49
End: 2024-01-25
Payer: COMMERCIAL

## 2024-01-25 NOTE — TELEPHONE ENCOUNTER
Pt states he was supposed to get an order for MRI at his last appt, is this okay to put in or would pt need re evaluated.

## 2024-02-14 DIAGNOSIS — N52.9 ERECTILE DISORDER: ICD-10-CM

## 2024-02-15 ENCOUNTER — OFFICE VISIT (OUTPATIENT)
Dept: PHYSICAL MEDICINE AND REHAB | Facility: CLINIC | Age: 49
End: 2024-02-15
Payer: COMMERCIAL

## 2024-02-15 VITALS
DIASTOLIC BLOOD PRESSURE: 77 MMHG | HEART RATE: 83 BPM | WEIGHT: 198 LBS | BODY MASS INDEX: 27.72 KG/M2 | HEIGHT: 71 IN | SYSTOLIC BLOOD PRESSURE: 129 MMHG

## 2024-02-15 DIAGNOSIS — R20.0 NUMBNESS AND TINGLING SENSATION OF SKIN: ICD-10-CM

## 2024-02-15 DIAGNOSIS — M47.816 LUMBAR SPONDYLOSIS: Primary | ICD-10-CM

## 2024-02-15 DIAGNOSIS — C61 MALIGNANT NEOPLASM OF PROSTATE (MULTI): ICD-10-CM

## 2024-02-15 DIAGNOSIS — R26.89 IMPAIRMENT OF BALANCE: ICD-10-CM

## 2024-02-15 DIAGNOSIS — M47.812 CERVICAL SPONDYLOSIS: ICD-10-CM

## 2024-02-15 DIAGNOSIS — R20.2 NUMBNESS AND TINGLING SENSATION OF SKIN: ICD-10-CM

## 2024-02-15 PROCEDURE — 3078F DIAST BP <80 MM HG: CPT | Performed by: PHYSICAL MEDICINE & REHABILITATION

## 2024-02-15 PROCEDURE — 3074F SYST BP LT 130 MM HG: CPT | Performed by: PHYSICAL MEDICINE & REHABILITATION

## 2024-02-15 PROCEDURE — 99205 OFFICE O/P NEW HI 60 MIN: CPT | Performed by: PHYSICAL MEDICINE & REHABILITATION

## 2024-02-15 RX ORDER — SILDENAFIL 100 MG/1
TABLET, FILM COATED ORAL
Qty: 10 TABLET | Refills: 11 | Status: SHIPPED | OUTPATIENT
Start: 2024-02-15 | End: 2024-03-04 | Stop reason: ALTCHOICE

## 2024-02-15 RX ORDER — SILDENAFIL 100 MG/1
TABLET, FILM COATED ORAL
Qty: 10 TABLET | Refills: 0 | Status: SHIPPED | OUTPATIENT
Start: 2024-02-15 | End: 2024-03-04 | Stop reason: ALTCHOICE

## 2024-02-15 RX ORDER — GABAPENTIN 300 MG/1
300 CAPSULE ORAL 3 TIMES DAILY
Qty: 90 CAPSULE | Refills: 11 | Status: SHIPPED | OUTPATIENT
Start: 2024-02-15 | End: 2024-02-27 | Stop reason: SDUPTHER
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
## 2024-02-19 ENCOUNTER — APPOINTMENT (OUTPATIENT)
Dept: PRIMARY CARE | Facility: CLINIC | Age: 49
End: 2024-02-19
Payer: COMMERCIAL

## 2024-02-27 ENCOUNTER — OFFICE VISIT (OUTPATIENT)
Dept: PRIMARY CARE | Facility: CLINIC | Age: 49
End: 2024-02-27
Payer: COMMERCIAL

## 2024-02-27 VITALS
BODY MASS INDEX: 25.52 KG/M2 | OXYGEN SATURATION: 98 % | DIASTOLIC BLOOD PRESSURE: 70 MMHG | SYSTOLIC BLOOD PRESSURE: 118 MMHG | WEIGHT: 183 LBS | TEMPERATURE: 97.4 F | HEART RATE: 82 BPM

## 2024-02-27 DIAGNOSIS — R26.89 IMPAIRMENT OF BALANCE: ICD-10-CM

## 2024-02-27 DIAGNOSIS — M47.816 LUMBAR SPONDYLOSIS: ICD-10-CM

## 2024-02-27 DIAGNOSIS — M47.812 CERVICAL SPONDYLOSIS: ICD-10-CM

## 2024-02-27 DIAGNOSIS — K52.9 IBD (INFLAMMATORY BOWEL DISEASE): ICD-10-CM

## 2024-02-27 DIAGNOSIS — I10 PRIMARY HYPERTENSION: ICD-10-CM

## 2024-02-27 DIAGNOSIS — I10 ESSENTIAL HYPERTENSION: ICD-10-CM

## 2024-02-27 DIAGNOSIS — K21.00 GASTROESOPHAGEAL REFLUX DISEASE WITH ESOPHAGITIS WITHOUT HEMORRHAGE: ICD-10-CM

## 2024-02-27 DIAGNOSIS — C61 MALIGNANT NEOPLASM OF PROSTATE (MULTI): ICD-10-CM

## 2024-02-27 DIAGNOSIS — F41.1 ANXIETY STATE: ICD-10-CM

## 2024-02-27 PROCEDURE — 3074F SYST BP LT 130 MM HG: CPT | Performed by: FAMILY MEDICINE

## 2024-02-27 PROCEDURE — 99214 OFFICE O/P EST MOD 30 MIN: CPT | Performed by: FAMILY MEDICINE

## 2024-02-27 PROCEDURE — 3078F DIAST BP <80 MM HG: CPT | Performed by: FAMILY MEDICINE

## 2024-02-27 RX ORDER — AMLODIPINE BESYLATE 10 MG/1
10 TABLET ORAL DAILY
Qty: 90 TABLET | Refills: 1 | Status: SHIPPED | OUTPATIENT
Start: 2024-02-27 | End: 2024-03-04 | Stop reason: SDUPTHER

## 2024-02-27 RX ORDER — CLONAZEPAM 1 MG/1
1 TABLET ORAL NIGHTLY
Qty: 90 TABLET | Refills: 0 | Status: SHIPPED | OUTPATIENT
Start: 2024-02-27 | End: 2024-04-04 | Stop reason: WASHOUT

## 2024-02-27 RX ORDER — NORTRIPTYLINE HYDROCHLORIDE 25 MG/1
25 CAPSULE ORAL NIGHTLY
Qty: 60 CAPSULE | Refills: 5 | Status: SHIPPED | OUTPATIENT
Start: 2024-02-27 | End: 2024-03-04 | Stop reason: SDUPTHER

## 2024-02-27 RX ORDER — LOSARTAN POTASSIUM AND HYDROCHLOROTHIAZIDE 25; 100 MG/1; MG/1
1 TABLET ORAL DAILY
Qty: 90 TABLET | Refills: 3 | Status: SHIPPED | OUTPATIENT
Start: 2024-02-27 | End: 2024-03-04 | Stop reason: SDUPTHER

## 2024-02-27 RX ORDER — DICYCLOMINE HYDROCHLORIDE 10 MG/1
10 CAPSULE ORAL 3 TIMES DAILY
Qty: 270 CAPSULE | Refills: 1 | Status: SHIPPED | OUTPATIENT
Start: 2024-02-27 | End: 2024-03-04 | Stop reason: SDUPTHER

## 2024-02-27 RX ORDER — GABAPENTIN 300 MG/1
300 CAPSULE ORAL 3 TIMES DAILY
Qty: 90 CAPSULE | Refills: 11 | Status: SHIPPED | OUTPATIENT
Start: 2024-02-27 | End: 2024-04-04 | Stop reason: SDUPTHER

## 2024-02-27 RX ORDER — BUPROPION HYDROCHLORIDE 300 MG/1
300 TABLET ORAL DAILY
Qty: 90 TABLET | Refills: 0 | Status: SHIPPED | OUTPATIENT
Start: 2024-02-27 | End: 2024-03-04 | Stop reason: SDUPTHER

## 2024-02-27 RX ORDER — PANTOPRAZOLE SODIUM 40 MG/1
40 TABLET, DELAYED RELEASE ORAL 2 TIMES DAILY
Qty: 180 TABLET | Refills: 1 | Status: SHIPPED | OUTPATIENT
Start: 2024-02-27 | End: 2024-08-25

## 2024-02-27 ASSESSMENT — ENCOUNTER SYMPTOMS
HYPERTENSION: 1
INSOMNIA: 1

## 2024-02-27 NOTE — PROGRESS NOTES
Subjective   Patient ID: Shakila Shah is a 48 y.o. male who presents for Anxiety and Depression (recheck).  Hypertension    Insomnia    ED: Helped by Viagra    Prostate CA: Patient is following up with with urology    Anxiety: working with Psych who started him on Vraylar.    OA:  hurts in his neck, lumbar spine, elbows, feet. Knees.  Really his whole body hurts.  Has tried medical MJ in the past and it helped him deal with pain at times but hasn't used it recently.  Unable to walk more than a block without a cane.  When lifting objects he gets shooting pain down his arms.    HTN: not controlled, taking meds    Abdominal pain: Helped by the dicyclomine 3 times a day.  Needs refills.    GERD: having upset stomach despite 20 mg PPI BID.      Current Outpatient Medications on File Prior to Visit   Medication Sig Dispense Refill    acetaminophen (Tylenol) 500 mg tablet TAKE TWO TABLETS BY MOUTH EVERY 8 HOURS AS NEEDED FOR PAIN (VIAL) 180 tablet 11    amLODIPine (Norvasc) 10 mg tablet Take 1 tablet (10 mg) by mouth once daily. 90 tablet 1    buPROPion XL (Wellbutrin XL) 150 mg 24 hr tablet Take 2 tablets (300 mg) by mouth once daily in the morning.      buPROPion XL (Wellbutrin XL) 300 mg 24 hr tablet TAKE ONE TABLET BY MOUTH DAILY AT 9AM 90 tablet 0    celecoxib (CeleBREX) 100 mg capsule Take 1 capsule (100 mg) by mouth 2 times a day.      clonazePAM (KlonoPIN) 1 mg tablet Take 1 tablet (1 mg) by mouth once daily at bedtime. 90 tablet 0    dicyclomine (Bentyl) 10 mg capsule Take 1 capsule (10 mg) by mouth 3 times a day. 270 capsule 1    famotidine (Pepcid) 20 mg tablet Take 1 tablet (20 mg) by mouth 2 times a day. 60 tablet 4    fluticasone (Flonase) 50 mcg/actuation nasal spray Administer 1 spray into each nostril once daily. Shake gently. Before first use, prime pump. After use, clean tip and replace cap. 16 g 5    gabapentin (Neurontin) 300 mg capsule Take 1 capsule (300 mg) by mouth 3 times a day. Take it night  for 5 days, then increase to am and pm for 5 days and then three times a day 90 capsule 11    lidocaine-prilocaine (Emla) 2.5-2.5 % cream To be applied BID prn 30 g 2    losartan-hydrochlorothiazide (Hyzaar) 100-25 mg tablet Take 1 tablet by mouth once daily. 90 tablet 3    mirtazapine (Remeron) 7.5 mg tablet Take 1 tablet (7.5 mg) by mouth once daily at bedtime.      miscellaneous medical supply (Blood Pressure Cuff) misc To be used daily 1 each 0    naltrexone (Depade) 50 mg tablet Take 1 tablet (50 mg) by mouth once daily.      nortriptyline (Pamelor) 25 mg capsule Take 1 capsule (25 mg) by mouth once daily at bedtime. Inc to 2 caps after 1 month 60 capsule 5    pantoprazole (ProtoNix) 40 mg EC tablet Take 1 tablet (40 mg) by mouth 2 times a day. Do not crush, chew, or split. 180 tablet 1    polyethylene glycol (Glycolax, Miralax) 17 gram/dose powder       sildenafil (Viagra) 100 mg tablet TAKE ONE TABLET BY MOUTH DAILY AS NEEDED FOR COITUS (VIAL) 10 tablet 0    sildenafil (Viagra) 100 mg tablet TAKE ONE TABLET BY MOUTH DAILY AS NEEDED FOR COITUS 10 tablet 11    tamsulosin (Flomax) 0.4 mg 24 hr capsule Take 1 capsule (0.4 mg) by mouth once daily. 90 capsule 1    Vraylar 1.5 mg capsule Take 1 capsule (1.5 mg) by mouth once daily.      WHEAT DEXTRIN ORAL Take by mouth. Mix 1 tablespoon in water or juice daily for regular bowel habits       No current facility-administered medications on file prior to visit.        Vitals:    02/27/24 1508   BP: 118/70   Pulse: 82   Temp: 36.3 °C (97.4 °F)   SpO2: 98%       Review of Systems   All other systems reviewed and are negative.      Objective     Physical Exam  Constitutional:       Appearance: Normal appearance. He is well-developed.   HENT:      Head: Atraumatic.   Cardiovascular:      Rate and Rhythm: Normal rate and regular rhythm.      Heart sounds: Normal heart sounds. No murmur heard.  Pulmonary:      Effort: Pulmonary effort is normal.      Breath sounds: Normal  breath sounds.   Abdominal:      General: Bowel sounds are normal.      Palpations: Abdomen is soft.   Skin:     General: Skin is warm.   Neurological:      General: No focal deficit present.      Mental Status: He is alert.   Psychiatric:         Mood and Affect: Mood normal.         Ancillary Orders on 01/09/2024   Component Date Value Ref Range Status    Ventricular Rate 01/09/2024 87  BPM Final    Atrial Rate 01/09/2024 87  BPM Final    OH Interval 01/09/2024 110  ms Final    QRS Duration 01/09/2024 81  ms Final    QT Interval 01/09/2024 378  ms Final    QTC Calculation(Bazett) 01/09/2024 455  ms Final    P Axis 01/09/2024 82  degrees Final    R Axis 01/09/2024 88  degrees Final    T [place] 01/09/2024 53  degrees Final    QRS Count 01/09/2024 13  beats Final    Q Onset 01/09/2024 253  ms Final    T Offset 01/09/2024 442  ms Final    QTC Fredericia 01/09/2024 427  ms Final   Lab on 01/09/2024   Component Date Value Ref Range Status    Prostate Specific AG 01/09/2024 0.01  <=4.00 ng/mL Final    Glucose 01/09/2024 90  74 - 99 mg/dL Final    Sodium 01/09/2024 139  136 - 145 mmol/L Final    Potassium 01/09/2024 3.9  3.5 - 5.3 mmol/L Final    Chloride 01/09/2024 106  98 - 107 mmol/L Final    Bicarbonate 01/09/2024 27  21 - 32 mmol/L Final    Anion Gap 01/09/2024 10  10 - 20 mmol/L Final    Urea Nitrogen 01/09/2024 12  6 - 23 mg/dL Final    Creatinine 01/09/2024 1.08  0.50 - 1.30 mg/dL Final    eGFR 01/09/2024 86  >60 mL/min/1.73m*2 Final    Calculations of estimated GFR are performed using the 2021 CKD-EPI Study Refit equation without the race variable for the IDMS-Traceable creatinine methods.  https://jasn.asnjournals.org/content/early/2021/09/22/ASN.5543287997    Calcium 01/09/2024 9.2  8.6 - 10.3 mg/dL Final    Albumin 01/09/2024 4.2  3.4 - 5.0 g/dL Final    Alkaline Phosphatase 01/09/2024 61  33 - 120 U/L Final    Total Protein 01/09/2024 7.0  6.4 - 8.2 g/dL Final    AST 01/09/2024 23  9 - 39 U/L Final     Bilirubin, Total 01/09/2024 0.7  0.0 - 1.2 mg/dL Final    ALT 01/09/2024 38  10 - 52 U/L Final    Patients treated with Sulfasalazine may generate falsely decreased results for ALT.    Protime 01/09/2024 11.0  9.8 - 12.8 seconds Final    INR 01/09/2024 1.0  0.9 - 1.1 Final    aPTT 01/09/2024 35  27 - 38 seconds Final       Assessment/Plan   Problem List Items Addressed This Visit       Anxiety state    Essential hypertension    Malignant neoplasm of prostate (CMS/HCC)     Other Visit Diagnoses       IBD (inflammatory bowel disease)        Primary hypertension        Lumbar spondylosis        Cervical spondylosis        Impairment of balance        Gastroesophageal reflux disease with esophagitis without hemorrhage              Doing stable right now.  Taking klonopin PRN.  Continue follow up with pain management and urology.  Fu in 6 mo

## 2024-02-28 ENCOUNTER — HOSPITAL ENCOUNTER (OUTPATIENT)
Dept: RADIOLOGY | Facility: CLINIC | Age: 49
Discharge: HOME | End: 2024-02-28
Payer: COMMERCIAL

## 2024-02-28 DIAGNOSIS — C61 MALIGNANT NEOPLASM OF PROSTATE (MULTI): ICD-10-CM

## 2024-02-28 DIAGNOSIS — M47.816 LUMBAR SPONDYLOSIS: ICD-10-CM

## 2024-02-28 DIAGNOSIS — R26.89 IMPAIRMENT OF BALANCE: ICD-10-CM

## 2024-02-28 DIAGNOSIS — M47.812 CERVICAL SPONDYLOSIS: ICD-10-CM

## 2024-02-28 PROCEDURE — 72141 MRI NECK SPINE W/O DYE: CPT | Performed by: RADIOLOGY

## 2024-02-28 PROCEDURE — 72141 MRI NECK SPINE W/O DYE: CPT

## 2024-03-04 DIAGNOSIS — F41.1 ANXIETY STATE: ICD-10-CM

## 2024-03-04 DIAGNOSIS — N52.9 ERECTILE DISORDER: ICD-10-CM

## 2024-03-04 DIAGNOSIS — K52.9 IBD (INFLAMMATORY BOWEL DISEASE): ICD-10-CM

## 2024-03-04 DIAGNOSIS — C61 MALIGNANT NEOPLASM OF PROSTATE (MULTI): ICD-10-CM

## 2024-03-04 DIAGNOSIS — I10 PRIMARY HYPERTENSION: ICD-10-CM

## 2024-03-04 DIAGNOSIS — I10 ESSENTIAL HYPERTENSION: ICD-10-CM

## 2024-03-04 RX ORDER — NORTRIPTYLINE HYDROCHLORIDE 25 MG/1
25 CAPSULE ORAL NIGHTLY
Qty: 60 CAPSULE | Refills: 5 | Status: SHIPPED | OUTPATIENT
Start: 2024-03-04 | End: 2025-02-27

## 2024-03-04 RX ORDER — BUPROPION HYDROCHLORIDE 300 MG/1
300 TABLET ORAL DAILY
Qty: 90 TABLET | Refills: 0 | Status: SHIPPED | OUTPATIENT
Start: 2024-03-04 | End: 2024-06-03

## 2024-03-04 RX ORDER — LOSARTAN POTASSIUM AND HYDROCHLOROTHIAZIDE 25; 100 MG/1; MG/1
1 TABLET ORAL DAILY
Qty: 90 TABLET | Refills: 3 | Status: SHIPPED | OUTPATIENT
Start: 2024-03-04 | End: 2025-03-04

## 2024-03-04 RX ORDER — DICYCLOMINE HYDROCHLORIDE 10 MG/1
10 CAPSULE ORAL 3 TIMES DAILY
Qty: 270 CAPSULE | Refills: 1 | Status: SHIPPED | OUTPATIENT
Start: 2024-03-04

## 2024-03-04 RX ORDER — SILDENAFIL 100 MG/1
TABLET, FILM COATED ORAL
Qty: 10 TABLET | Refills: 0 | Status: SHIPPED | OUTPATIENT
Start: 2024-03-04 | End: 2024-04-22 | Stop reason: SDUPTHER

## 2024-03-04 RX ORDER — AMLODIPINE BESYLATE 10 MG/1
10 TABLET ORAL DAILY
Qty: 90 TABLET | Refills: 1 | Status: SHIPPED | OUTPATIENT
Start: 2024-03-04 | End: 2024-06-03

## 2024-03-04 RX ORDER — TAMSULOSIN HYDROCHLORIDE 0.4 MG/1
0.4 CAPSULE ORAL DAILY
Qty: 90 CAPSULE | Refills: 1 | Status: SHIPPED | OUTPATIENT
Start: 2024-03-04

## 2024-03-20 ENCOUNTER — APPOINTMENT (OUTPATIENT)
Dept: PRIMARY CARE | Facility: CLINIC | Age: 49
End: 2024-03-20
Payer: COMMERCIAL

## 2024-03-21 ENCOUNTER — TELEPHONE (OUTPATIENT)
Dept: PHYSICAL MEDICINE AND REHAB | Facility: CLINIC | Age: 49
End: 2024-03-21

## 2024-03-21 ENCOUNTER — APPOINTMENT (OUTPATIENT)
Dept: PHYSICAL MEDICINE AND REHAB | Facility: CLINIC | Age: 49
End: 2024-03-21
Payer: COMMERCIAL

## 2024-03-21 NOTE — TELEPHONE ENCOUNTER
Called left pt message that I canged his appointment to 1:40 this afternoon due to doctor not being in this am.

## 2024-04-04 ENCOUNTER — OFFICE VISIT (OUTPATIENT)
Dept: PHYSICAL MEDICINE AND REHAB | Facility: CLINIC | Age: 49
End: 2024-04-04
Payer: COMMERCIAL

## 2024-04-04 VITALS — RESPIRATION RATE: 18 BRPM | DIASTOLIC BLOOD PRESSURE: 84 MMHG | HEART RATE: 73 BPM | SYSTOLIC BLOOD PRESSURE: 127 MMHG

## 2024-04-04 DIAGNOSIS — M47.816 LUMBAR SPONDYLOSIS: ICD-10-CM

## 2024-04-04 DIAGNOSIS — M47.812 CERVICAL SPONDYLOSIS: Primary | ICD-10-CM

## 2024-04-04 DIAGNOSIS — C61 MALIGNANT NEOPLASM OF PROSTATE (MULTI): ICD-10-CM

## 2024-04-04 DIAGNOSIS — R26.89 IMPAIRMENT OF BALANCE: ICD-10-CM

## 2024-04-04 PROCEDURE — 99214 OFFICE O/P EST MOD 30 MIN: CPT | Performed by: PHYSICAL MEDICINE & REHABILITATION

## 2024-04-04 PROCEDURE — 3079F DIAST BP 80-89 MM HG: CPT | Performed by: PHYSICAL MEDICINE & REHABILITATION

## 2024-04-04 PROCEDURE — 3074F SYST BP LT 130 MM HG: CPT | Performed by: PHYSICAL MEDICINE & REHABILITATION

## 2024-04-04 RX ORDER — GABAPENTIN 300 MG/1
300 CAPSULE ORAL 3 TIMES DAILY
Qty: 90 CAPSULE | Refills: 1 | Status: SHIPPED | OUTPATIENT
Start: 2024-04-04 | End: 2025-04-04

## 2024-04-04 ASSESSMENT — PAIN SCALES - GENERAL: PAINLEVEL: 7
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
## 2024-04-11 DIAGNOSIS — M15.9 OSTEOARTHRITIS, GENERALIZED: ICD-10-CM

## 2024-04-11 RX ORDER — ACETAMINOPHEN 500 MG
TABLET ORAL
Qty: 180 TABLET | Refills: 11 | Status: SHIPPED | OUTPATIENT
Start: 2024-04-11

## 2024-04-22 ENCOUNTER — APPOINTMENT (OUTPATIENT)
Dept: OPERATING ROOM | Facility: HOSPITAL | Age: 49
End: 2024-04-22
Payer: COMMERCIAL

## 2024-04-22 DIAGNOSIS — N52.9 ERECTILE DISORDER: ICD-10-CM

## 2024-04-22 RX ORDER — SILDENAFIL 100 MG/1
TABLET, FILM COATED ORAL
Qty: 10 TABLET | Refills: 5 | Status: SHIPPED | OUTPATIENT
Start: 2024-04-22 | End: 2024-04-30 | Stop reason: SDUPTHER

## 2024-04-22 NOTE — TELEPHONE ENCOUNTER
Called rx line @ 11:28am requesting RF on Viagra.    Next OV 8/27/24  Pt compliant  Please advise. Thanks. JW

## 2024-04-26 NOTE — PROGRESS NOTES
Subjective   Patient ID: Shakila Shah is a 48 y.o. male with GGG 2 PCa 1.5 years s/p RALP who presents for 6 month FUV.     PSA: 0.03. He had ultra sensitive PSA done, I am not concerned about this.     BETI: He is not wearing pads or Depends. No leakage.      ED: He has seen Dr. Dawkins for ICI but has not had a follow up appointment with him. He states it worked for him in the office but did not work well for him at home. He has failed medical therapy, IPP and constriction band.  He states he can get engorged but not erect. This is distressing to him and he is bothered by this as it is impacting his relationship.      Past medical, surgical, family and social history were reviewed and unchanged since last visit besides what is in the HPI.          Review of Systems   All other systems reviewed and are negative.      Objective   Physical Exam  Gen: No acute distress     Psych: Alert and oriented x3     Neuro:  Normal ROM    Resp: Nonlabored respirations     CV: Regular rate and rhythm     Abd: S, NT, ND.     : Deferred    Skin: Warm, dry and intact without rashes     Lymphatics: No peripheral edema       Assessment/Plan   Problem List Items Addressed This Visit             ICD-10-CM    Malignant neoplasm of prostate (Multi) - Primary C61     PSA: 0.03. He had ultra sensitive PSA done, I am not concerned about this. We discussed that value threshold for BCR is 0.20.  We will repeat this q 6 months for one more year, then move this out to yearly.     BETI: No leakage    ED: He has seen Dr. Dawkins for ICI but has not had a follow up appointment with him. This is distressing to him and he is bothered by this as it is impacting his relationship. We discussed there are other options for him if ICI is not compatible. I will help him schedule a FUV.                PLAN:  6 month FUV with repeat PSA  Refer him back to Dr. Dawkins for further discussion   Will refill prescription for Viagra 100mg

## 2024-04-29 ENCOUNTER — APPOINTMENT (OUTPATIENT)
Dept: PHYSICAL THERAPY | Facility: HOSPITAL | Age: 49
End: 2024-04-29
Payer: COMMERCIAL

## 2024-04-29 ENCOUNTER — DOCUMENTATION (OUTPATIENT)
Dept: PHYSICAL THERAPY | Facility: HOSPITAL | Age: 49
End: 2024-04-29
Payer: COMMERCIAL

## 2024-04-29 NOTE — PROGRESS NOTES
Physical Therapy                 Therapy Communication Note    Patient Name: Shakila Shah  MRN: [id number]  Today's Date: 4/29/2024     Discipline: Physical Therapy    Missed Visit Reason:      Missed Time: No Show    Comment:

## 2024-04-30 ENCOUNTER — LAB (OUTPATIENT)
Dept: LAB | Facility: LAB | Age: 49
End: 2024-04-30
Payer: COMMERCIAL

## 2024-04-30 ENCOUNTER — TELEPHONE (OUTPATIENT)
Dept: PRIMARY CARE | Facility: CLINIC | Age: 49
End: 2024-04-30

## 2024-04-30 ENCOUNTER — TELEPHONE (OUTPATIENT)
Dept: PHYSICAL MEDICINE AND REHAB | Facility: CLINIC | Age: 49
End: 2024-04-30

## 2024-04-30 ENCOUNTER — OFFICE VISIT (OUTPATIENT)
Dept: UROLOGY | Facility: HOSPITAL | Age: 49
End: 2024-04-30
Payer: COMMERCIAL

## 2024-04-30 VITALS
RESPIRATION RATE: 20 BRPM | BODY MASS INDEX: 25.31 KG/M2 | DIASTOLIC BLOOD PRESSURE: 74 MMHG | WEIGHT: 181.44 LBS | HEART RATE: 103 BPM | OXYGEN SATURATION: 99 % | SYSTOLIC BLOOD PRESSURE: 115 MMHG | TEMPERATURE: 99.3 F

## 2024-04-30 DIAGNOSIS — R20.2 NUMBNESS AND TINGLING SENSATION OF SKIN: ICD-10-CM

## 2024-04-30 DIAGNOSIS — N52.9 ERECTILE DISORDER: Primary | ICD-10-CM

## 2024-04-30 DIAGNOSIS — R20.0 NUMBNESS AND TINGLING SENSATION OF SKIN: ICD-10-CM

## 2024-04-30 DIAGNOSIS — C61 MALIGNANT NEOPLASM OF PROSTATE (MULTI): Primary | ICD-10-CM

## 2024-04-30 DIAGNOSIS — R26.89 IMPAIRMENT OF BALANCE: ICD-10-CM

## 2024-04-30 DIAGNOSIS — M47.812 CERVICAL SPONDYLOSIS: ICD-10-CM

## 2024-04-30 DIAGNOSIS — Z12.5 SCREENING FOR PROSTATE CANCER: ICD-10-CM

## 2024-04-30 DIAGNOSIS — C61 MALIGNANT NEOPLASM OF PROSTATE (MULTI): ICD-10-CM

## 2024-04-30 DIAGNOSIS — R79.89 LOW VITAMIN D LEVEL: Primary | ICD-10-CM

## 2024-04-30 DIAGNOSIS — I10 PRIMARY HYPERTENSION: ICD-10-CM

## 2024-04-30 DIAGNOSIS — M47.816 LUMBAR SPONDYLOSIS: ICD-10-CM

## 2024-04-30 LAB
25(OH)D3 SERPL-MCNC: 9 NG/ML (ref 30–100)
ALBUMIN SERPL BCP-MCNC: 4 G/DL (ref 3.4–5)
ALP SERPL-CCNC: 59 U/L (ref 33–120)
ALT SERPL W P-5'-P-CCNC: 20 U/L (ref 10–52)
ANION GAP SERPL CALC-SCNC: 12 MMOL/L (ref 10–20)
AST SERPL W P-5'-P-CCNC: 17 U/L (ref 9–39)
BASOPHILS # BLD AUTO: 0.08 X10*3/UL (ref 0–0.1)
BASOPHILS NFR BLD AUTO: 0.8 %
BILIRUB SERPL-MCNC: 0.4 MG/DL (ref 0–1.2)
BUN SERPL-MCNC: 12 MG/DL (ref 6–23)
CALCIUM SERPL-MCNC: 8.7 MG/DL (ref 8.6–10.3)
CHLORIDE SERPL-SCNC: 108 MMOL/L (ref 98–107)
CHOLEST SERPL-MCNC: 187 MG/DL (ref 0–199)
CHOLESTEROL/HDL RATIO: 6.5
CO2 SERPL-SCNC: 25 MMOL/L (ref 21–32)
CREAT SERPL-MCNC: 1.34 MG/DL (ref 0.5–1.3)
EGFRCR SERPLBLD CKD-EPI 2021: 66 ML/MIN/1.73M*2
EOSINOPHIL # BLD AUTO: 0.24 X10*3/UL (ref 0–0.7)
EOSINOPHIL NFR BLD AUTO: 2.4 %
ERYTHROCYTE [DISTWIDTH] IN BLOOD BY AUTOMATED COUNT: 14 % (ref 11.5–14.5)
GLUCOSE SERPL-MCNC: 98 MG/DL (ref 74–99)
HCT VFR BLD AUTO: 45 % (ref 41–52)
HDLC SERPL-MCNC: 28.6 MG/DL
HGB BLD-MCNC: 14.6 G/DL (ref 13.5–17.5)
IMM GRANULOCYTES # BLD AUTO: 0.06 X10*3/UL (ref 0–0.7)
IMM GRANULOCYTES NFR BLD AUTO: 0.6 % (ref 0–0.9)
LDLC SERPL CALC-MCNC: 119 MG/DL
LYMPHOCYTES # BLD AUTO: 3.5 X10*3/UL (ref 1.2–4.8)
LYMPHOCYTES NFR BLD AUTO: 35 %
MCH RBC QN AUTO: 30.5 PG (ref 26–34)
MCHC RBC AUTO-ENTMCNC: 32.4 G/DL (ref 32–36)
MCV RBC AUTO: 94 FL (ref 80–100)
MONOCYTES # BLD AUTO: 1.03 X10*3/UL (ref 0.1–1)
MONOCYTES NFR BLD AUTO: 10.3 %
NEUTROPHILS # BLD AUTO: 5.09 X10*3/UL (ref 1.2–7.7)
NEUTROPHILS NFR BLD AUTO: 50.9 %
NON HDL CHOLESTEROL: 158 MG/DL (ref 0–149)
NRBC BLD-RTO: 0 /100 WBCS (ref 0–0)
PLATELET # BLD AUTO: 382 X10*3/UL (ref 150–450)
POTASSIUM SERPL-SCNC: 3.8 MMOL/L (ref 3.5–5.3)
PROT SERPL-MCNC: 6.8 G/DL (ref 6.4–8.2)
PSA SERPL-MCNC: 0.03 NG/ML
RBC # BLD AUTO: 4.78 X10*6/UL (ref 4.5–5.9)
SODIUM SERPL-SCNC: 141 MMOL/L (ref 136–145)
TRIGL SERPL-MCNC: 196 MG/DL (ref 0–149)
VIT B12 SERPL-MCNC: 226 PG/ML (ref 211–911)
VLDL: 39 MG/DL (ref 0–40)
WBC # BLD AUTO: 10 X10*3/UL (ref 4.4–11.3)

## 2024-04-30 PROCEDURE — 3078F DIAST BP <80 MM HG: CPT | Performed by: STUDENT IN AN ORGANIZED HEALTH CARE EDUCATION/TRAINING PROGRAM

## 2024-04-30 PROCEDURE — 85025 COMPLETE CBC W/AUTO DIFF WBC: CPT

## 2024-04-30 PROCEDURE — 99213 OFFICE O/P EST LOW 20 MIN: CPT | Performed by: STUDENT IN AN ORGANIZED HEALTH CARE EDUCATION/TRAINING PROGRAM

## 2024-04-30 PROCEDURE — 80053 COMPREHEN METABOLIC PANEL: CPT

## 2024-04-30 PROCEDURE — 82306 VITAMIN D 25 HYDROXY: CPT

## 2024-04-30 PROCEDURE — 82607 VITAMIN B-12: CPT

## 2024-04-30 PROCEDURE — 4004F PT TOBACCO SCREEN RCVD TLK: CPT | Performed by: STUDENT IN AN ORGANIZED HEALTH CARE EDUCATION/TRAINING PROGRAM

## 2024-04-30 PROCEDURE — 84153 ASSAY OF PSA TOTAL: CPT

## 2024-04-30 PROCEDURE — 36415 COLL VENOUS BLD VENIPUNCTURE: CPT

## 2024-04-30 PROCEDURE — 3074F SYST BP LT 130 MM HG: CPT | Performed by: STUDENT IN AN ORGANIZED HEALTH CARE EDUCATION/TRAINING PROGRAM

## 2024-04-30 PROCEDURE — 80061 LIPID PANEL: CPT

## 2024-04-30 RX ORDER — SILDENAFIL 100 MG/1
TABLET, FILM COATED ORAL
Qty: 10 TABLET | Refills: 5 | Status: SHIPPED | OUTPATIENT
Start: 2024-04-30 | End: 2024-06-06

## 2024-04-30 RX ORDER — ERGOCALCIFEROL 1.25 MG/1
1.25 CAPSULE ORAL
Qty: 12 CAPSULE | Refills: 0 | Status: SHIPPED | OUTPATIENT
Start: 2024-05-05 | End: 2024-07-22

## 2024-04-30 ASSESSMENT — PAIN SCALES - GENERAL: PAINLEVEL: 9

## 2024-04-30 NOTE — TELEPHONE ENCOUNTER
Please call patient, will call in vitamin d since its very low, 9.  1 cap of 50,000 units once a week for 12 weeks.  Then can take over the counter dose 2,000-4,000 units/daily

## 2024-04-30 NOTE — TELEPHONE ENCOUNTER
----- Message from Jay Hastings MD sent at 4/30/2024  3:11 PM EDT -----  Pts BW looks stable.  Kidneys looked worse.  Try to stay well hydrated

## 2024-04-30 NOTE — ASSESSMENT & PLAN NOTE
PSA: 0.03. He had ultra sensitive PSA done, I am not concerned about this. We discussed that value threshold for BCR is 0.20.  We will repeat this q 6 months for one more year, then move this out to yearly.     BETI: No leakage    ED: He has seen Dr. Dawkins for ICI but has not had a follow up appointment with him. This is distressing to him and he is bothered by this as it is impacting his relationship. We discussed there are other options for him if ICI is not compatible. I will help him schedule a FUV.

## 2024-05-07 ENCOUNTER — DOCUMENTATION (OUTPATIENT)
Dept: PHYSICAL THERAPY | Facility: HOSPITAL | Age: 49
End: 2024-05-07
Payer: COMMERCIAL

## 2024-05-07 NOTE — PROGRESS NOTES
Physical Therapy                 Therapy Communication Note    Patient Name: Shakila Shah  MRN: [id number]  : 1975   Today's Date: 2024     Discipline: Physical Therapy    Missed Time: No Show    Comment: Pt did not show for his PT initial evaluation.

## 2024-05-08 ENCOUNTER — OFFICE VISIT (OUTPATIENT)
Dept: PAIN MEDICINE | Facility: HOSPITAL | Age: 49
End: 2024-05-08
Payer: COMMERCIAL

## 2024-05-08 VITALS
SYSTOLIC BLOOD PRESSURE: 139 MMHG | HEIGHT: 70 IN | OXYGEN SATURATION: 94 % | DIASTOLIC BLOOD PRESSURE: 93 MMHG | BODY MASS INDEX: 27.14 KG/M2 | RESPIRATION RATE: 16 BRPM | WEIGHT: 189.6 LBS | HEART RATE: 90 BPM

## 2024-05-08 DIAGNOSIS — C61 MALIGNANT NEOPLASM OF PROSTATE (MULTI): ICD-10-CM

## 2024-05-08 DIAGNOSIS — M47.812 CERVICAL SPONDYLOSIS: ICD-10-CM

## 2024-05-08 DIAGNOSIS — M47.816 LUMBAR SPONDYLOSIS: ICD-10-CM

## 2024-05-08 DIAGNOSIS — R26.89 IMPAIRMENT OF BALANCE: ICD-10-CM

## 2024-05-08 PROCEDURE — 3075F SYST BP GE 130 - 139MM HG: CPT | Performed by: PHYSICAL MEDICINE & REHABILITATION

## 2024-05-08 PROCEDURE — 99214 OFFICE O/P EST MOD 30 MIN: CPT | Performed by: PHYSICAL MEDICINE & REHABILITATION

## 2024-05-08 PROCEDURE — 99204 OFFICE O/P NEW MOD 45 MIN: CPT | Performed by: PHYSICAL MEDICINE & REHABILITATION

## 2024-05-08 PROCEDURE — 3080F DIAST BP >= 90 MM HG: CPT | Performed by: PHYSICAL MEDICINE & REHABILITATION

## 2024-05-08 ASSESSMENT — PATIENT HEALTH QUESTIONNAIRE - PHQ9
1. LITTLE INTEREST OR PLEASURE IN DOING THINGS: SEVERAL DAYS
2. FEELING DOWN, DEPRESSED OR HOPELESS: SEVERAL DAYS
10. IF YOU CHECKED OFF ANY PROBLEMS, HOW DIFFICULT HAVE THESE PROBLEMS MADE IT FOR YOU TO DO YOUR WORK, TAKE CARE OF THINGS AT HOME, OR GET ALONG WITH OTHER PEOPLE: SOMEWHAT DIFFICULT
SUM OF ALL RESPONSES TO PHQ9 QUESTIONS 1 AND 2: 2

## 2024-05-08 ASSESSMENT — COLUMBIA-SUICIDE SEVERITY RATING SCALE - C-SSRS
6. HAVE YOU EVER DONE ANYTHING, STARTED TO DO ANYTHING, OR PREPARED TO DO ANYTHING TO END YOUR LIFE?: NO
2. HAVE YOU ACTUALLY HAD ANY THOUGHTS OF KILLING YOURSELF?: NO
6. HAVE YOU EVER DONE ANYTHING, STARTED TO DO ANYTHING, OR PREPARED TO DO ANYTHING TO END YOUR LIFE?: YES
1. IN THE PAST MONTH, HAVE YOU WISHED YOU WERE DEAD OR WISHED YOU COULD GO TO SLEEP AND NOT WAKE UP?: NO

## 2024-05-08 ASSESSMENT — ENCOUNTER SYMPTOMS
DEPRESSION: 1
LOSS OF SENSATION IN FEET: 1
OCCASIONAL FEELINGS OF UNSTEADINESS: 1
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
## 2024-05-13 DIAGNOSIS — K21.00 GASTROESOPHAGEAL REFLUX DISEASE WITH ESOPHAGITIS WITHOUT HEMORRHAGE: ICD-10-CM

## 2024-05-14 RX ORDER — PANTOPRAZOLE SODIUM 40 MG/1
TABLET, DELAYED RELEASE ORAL
Qty: 180 TABLET | Refills: 11 | OUTPATIENT
Start: 2024-05-14

## 2024-05-23 ENCOUNTER — PREP FOR PROCEDURE (OUTPATIENT)
Dept: UROLOGY | Facility: HOSPITAL | Age: 49
End: 2024-05-23

## 2024-05-23 ENCOUNTER — OFFICE VISIT (OUTPATIENT)
Dept: UROLOGY | Facility: HOSPITAL | Age: 49
End: 2024-05-23
Payer: COMMERCIAL

## 2024-05-23 DIAGNOSIS — N52.31 ERECTILE DYSFUNCTION AFTER RADICAL PROSTATECTOMY: Primary | ICD-10-CM

## 2024-05-23 PROCEDURE — 99214 OFFICE O/P EST MOD 30 MIN: CPT | Performed by: UROLOGY

## 2024-05-23 RX ORDER — GABAPENTIN 600 MG/1
600 TABLET ORAL ONCE
OUTPATIENT
Start: 2024-05-23 | End: 2024-05-23

## 2024-05-23 RX ORDER — VANCOMYCIN HYDROCHLORIDE 1 G/200ML
1000 INJECTION, SOLUTION INTRAVENOUS ONCE
OUTPATIENT
Start: 2024-05-23 | End: 2024-05-23

## 2024-05-23 RX ORDER — CHLORHEXIDINE GLUCONATE 40 MG/ML
SOLUTION TOPICAL DAILY PRN
OUTPATIENT
Start: 2024-05-23

## 2024-05-23 RX ORDER — SODIUM CHLORIDE, SODIUM LACTATE, POTASSIUM CHLORIDE, CALCIUM CHLORIDE 600; 310; 30; 20 MG/100ML; MG/100ML; MG/100ML; MG/100ML
100 INJECTION, SOLUTION INTRAVENOUS CONTINUOUS
OUTPATIENT
Start: 2024-05-23

## 2024-05-23 RX ORDER — FLUCONAZOLE 2 MG/ML
200 INJECTION, SOLUTION INTRAVENOUS ONCE
OUTPATIENT
Start: 2024-05-23 | End: 2024-05-23

## 2024-05-23 RX ORDER — ACETAMINOPHEN 325 MG/1
650 TABLET ORAL ONCE
OUTPATIENT
Start: 2024-05-23 | End: 2024-05-23

## 2024-05-23 NOTE — PROGRESS NOTES
FUV    Last seen - 12/15/23     HISTORY OF PRESENT ILLNESS:   Shakila Shah is a 48 y.o. male who is being seen today for fuv.      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 No leakage.      ED: He is not able to have erections sufficient for intercourse. He is taking daily and OD dose of Cialis and Viagra with no improvement.  Tried AKIRA.  Doppler showed Veno-occlusive disease,  Started on trimix #5 at last visit. Not working well    PAST MEDICAL HISTORY:  No past medical history on file.    PAST SURGICAL HISTORY:  No past surgical history on file.     ALLERGIES:   No Known Allergies     MEDICATIONS:   Current Outpatient Medications   Medication Instructions    acetaminophen (Tylenol) 500 mg tablet TAKE TWO TABLETS BY MOUTH EVERY 8 HOURS AS NEEDED FOR PAIN (VIAL)    amLODIPine (NORVASC) 10 mg, oral, Daily    buPROPion XL (WELLBUTRIN XL) 300 mg, oral, Daily, Take at 9 AM    celecoxib (CeleBREX) 100 mg capsule 1 capsule, oral, 2 times daily    dicyclomine (BENTYL) 10 mg, oral, 3 times daily    ergocalciferol (VITAMIN D2) 1,250 mcg, oral, Once Weekly    fluticasone (Flonase) 50 mcg/actuation nasal spray 1 spray, Each Nostril, Daily, Shake gently. Before first use, prime pump. After use, clean tip and replace cap.    gabapentin (NEURONTIN) 300 mg, oral, 3 times daily, Take it night for 5 days, then increase to am and pm for 5 days and then three times a day    lidocaine-prilocaine (Emla) 2.5-2.5 % cream To be applied BID prn    losartan-hydrochlorothiazide (Hyzaar) 100-25 mg tablet 1 tablet, oral, Daily    mirtazapine (REMERON) 7.5 mg, oral, Nightly    miscellaneous medical supply (Blood Pressure Cuff) misc To be used daily    naltrexone (DEPADE) 50 mg, oral, Daily    nortriptyline (PAMELOR) 25 mg, oral, Nightly, Inc to 2 caps after 1 month    pantoprazole (PROTONIX) 40 mg, oral, 2 times daily, Do not crush, chew, or split.    polyethylene glycol (Glycolax, Miralax)  17 gram/dose powder     sildenafil (Viagra) 100 mg tablet TAKE ONE TABLET BY MOUTH DAILY AS NEEDED FOR COITUS (VIAL)    tamsulosin (FLOMAX) 0.4 mg, oral, Daily    Vraylar 1.5 mg, oral, Daily        PHYSICAL EXAM:  There were no vitals taken for this visit.  Constitutional: Patient appears well-developed and well-nourished. No distress.    Pulmonary/Chest: Effort normal. No respiratory distress.   Abdominal: Soft, ND NT  : Circumcised phallus, no lesions or plaques  Musculoskeletal: Normal range of motion.    Neurological: Alert and oriented to person, place, and time.  Psychiatric: Normal mood and affect. Behavior is normal. Thought content normal.      Labs  Lab Results   Component Value Date    TESTOSTERONE 295 03/16/2022     Lab Results   Component Value Date    PSA 0.03 04/30/2024     Lab Results   Component Value Date    GFRMALE 74 03/16/2023     Lab Results   Component Value Date    CREATININE 1.34 (H) 04/30/2024     Lab Results   Component Value Date    CHOL 187 04/30/2024     Lab Results   Component Value Date    HDL 28.6 04/30/2024     Lab Results   Component Value Date    CHHDL 6.5 04/30/2024     Lab Results   Component Value Date    LDLF 90 03/16/2022     Lab Results   Component Value Date    VLDL 39 04/30/2024     Lab Results   Component Value Date    TRIG 196 (H) 04/30/2024     Lab Results   Component Value Date    HCT 45.0 04/30/2024       Assessment:      1. Erectile dysfunction after radical prostatectomy             Plan:   Penile Prosthesis  The patient has erectile dysfunction refractory to conservative management. Etiology of his erectile dysfunction is post prostatectomy.  Patient has failed PDE5i and ICI.      We discussed that further treatment would require penile prosthesis surgery.  We discussed that this artificial device would be placed into the penis and disrupt the tissue that creates natural erections so he can never go back to another kind of treatment for erections.  His penis may  look and feel different, even with a completely deflated prosthesis.  We discussed that this procedure will not make his penis longer, and in fact he may look smaller then his previous natural erections.  We may have the patient use the vacuum erectile device to increase blood flow to his penis, if he is amenable.      We discussed the differences between the malleable (semirigid) and inflatable (hydraulic) types of penile prosthesis.  We discussed that the inflatable prosthesis would also include a pump in the scrotum and a reservoir that would be placed in the pelvis or abdominal wall.  We discussed that sometimes placement of the reservoir requires a second incision.  Models were used to show the prosthesis and patient was able to work with them.    Risks of the surgery were discussed, which include but are not limited to pain, bleeding, erosion of the prosthesis, mechanical failure requiring surgical replacement, urinary retention, infection of the prosthesis requiring surgical removal and/or replacement, and damage to surrounding structures including the penis, urethra, bladder, and pelvic structures.      After all of the patient's questions were satisfactorily answered, he expressed understanding of the risks of surgery and wishes to proceed.  No barriers to learning were identified.    Patient ill be scheduled in a timely fashion.  He will see pre-admission testing and obtain any clearance if necessary.     Plan for coloplast titan IP

## 2024-06-03 DIAGNOSIS — I10 PRIMARY HYPERTENSION: ICD-10-CM

## 2024-06-03 DIAGNOSIS — F41.1 ANXIETY STATE: ICD-10-CM

## 2024-06-03 RX ORDER — AMLODIPINE BESYLATE 10 MG/1
10 TABLET ORAL DAILY
Qty: 90 TABLET | Refills: 1 | Status: SHIPPED | OUTPATIENT
Start: 2024-06-03

## 2024-06-03 RX ORDER — BUPROPION HYDROCHLORIDE 300 MG/1
300 TABLET ORAL DAILY
Qty: 90 TABLET | Refills: 1 | Status: SHIPPED | OUTPATIENT
Start: 2024-06-03

## 2024-06-05 ENCOUNTER — CLINICAL SUPPORT (OUTPATIENT)
Dept: PREADMISSION TESTING | Facility: HOSPITAL | Age: 49
End: 2024-06-05
Payer: COMMERCIAL

## 2024-06-05 DIAGNOSIS — N52.31 ERECTILE DYSFUNCTION AFTER RADICAL PROSTATECTOMY: ICD-10-CM

## 2024-06-05 DIAGNOSIS — N52.9 ERECTILE DISORDER: ICD-10-CM

## 2024-06-06 ENCOUNTER — APPOINTMENT (OUTPATIENT)
Dept: PHYSICAL MEDICINE AND REHAB | Facility: CLINIC | Age: 49
End: 2024-06-06
Payer: COMMERCIAL

## 2024-06-06 DIAGNOSIS — N52.9 ERECTILE DISORDER: ICD-10-CM

## 2024-06-06 RX ORDER — SILDENAFIL 100 MG/1
TABLET, FILM COATED ORAL
Qty: 10 TABLET | Refills: 5 | Status: SHIPPED | OUTPATIENT
Start: 2024-06-06

## 2024-06-06 RX ORDER — SILDENAFIL 100 MG/1
TABLET, FILM COATED ORAL
Qty: 10 TABLET | Refills: 11 | Status: SHIPPED | OUTPATIENT
Start: 2024-06-06

## 2024-06-12 ENCOUNTER — LAB (OUTPATIENT)
Dept: LAB | Facility: LAB | Age: 49
End: 2024-06-12
Payer: COMMERCIAL

## 2024-06-12 ENCOUNTER — DOCUMENTATION (OUTPATIENT)
Dept: PREADMISSION TESTING | Facility: HOSPITAL | Age: 49
End: 2024-06-12

## 2024-06-12 ENCOUNTER — PRE-ADMISSION TESTING (OUTPATIENT)
Dept: PREADMISSION TESTING | Facility: HOSPITAL | Age: 49
End: 2024-06-12
Payer: COMMERCIAL

## 2024-06-12 VITALS
HEART RATE: 84 BPM | TEMPERATURE: 98.8 F | SYSTOLIC BLOOD PRESSURE: 142 MMHG | WEIGHT: 179.9 LBS | BODY MASS INDEX: 25.75 KG/M2 | HEIGHT: 70 IN | OXYGEN SATURATION: 100 % | RESPIRATION RATE: 18 BRPM | DIASTOLIC BLOOD PRESSURE: 89 MMHG

## 2024-06-12 DIAGNOSIS — Z01.818 PRE-OP TESTING: ICD-10-CM

## 2024-06-12 DIAGNOSIS — N52.31 ERECTILE DYSFUNCTION AFTER RADICAL PROSTATECTOMY: ICD-10-CM

## 2024-06-12 DIAGNOSIS — Z01.818 PRE-OP TESTING: Primary | ICD-10-CM

## 2024-06-12 LAB
ANION GAP SERPL CALC-SCNC: 12 MMOL/L (ref 10–20)
APPEARANCE UR: CLEAR
APTT PPP: 34 SECONDS (ref 27–38)
ATRIAL RATE: 83 BPM
BACTERIA #/AREA URNS AUTO: ABNORMAL /HPF
BILIRUB UR STRIP.AUTO-MCNC: NEGATIVE MG/DL
BUN SERPL-MCNC: 13 MG/DL (ref 6–23)
CALCIUM SERPL-MCNC: 9 MG/DL (ref 8.6–10.3)
CHLORIDE SERPL-SCNC: 104 MMOL/L (ref 98–107)
CO2 SERPL-SCNC: 25 MMOL/L (ref 21–32)
COLOR UR: ABNORMAL
CREAT SERPL-MCNC: 1.33 MG/DL (ref 0.5–1.3)
EGFRCR SERPLBLD CKD-EPI 2021: 67 ML/MIN/1.73M*2
ERYTHROCYTE [DISTWIDTH] IN BLOOD BY AUTOMATED COUNT: 13.9 % (ref 11.5–14.5)
GLUCOSE SERPL-MCNC: 85 MG/DL (ref 74–99)
GLUCOSE UR STRIP.AUTO-MCNC: NORMAL MG/DL
HCT VFR BLD AUTO: 44.3 % (ref 41–52)
HGB BLD-MCNC: 14.3 G/DL (ref 13.5–17.5)
INR PPP: 0.9 (ref 0.9–1.1)
KETONES UR STRIP.AUTO-MCNC: NEGATIVE MG/DL
LEUKOCYTE ESTERASE UR QL STRIP.AUTO: NEGATIVE
MCH RBC QN AUTO: 30.6 PG (ref 26–34)
MCHC RBC AUTO-ENTMCNC: 32.3 G/DL (ref 32–36)
MCV RBC AUTO: 95 FL (ref 80–100)
NITRITE UR QL STRIP.AUTO: NEGATIVE
NRBC BLD-RTO: 0 /100 WBCS (ref 0–0)
P AXIS: 73 DEGREES
P OFFSET: 210 MS
P ONSET: 167 MS
PH UR STRIP.AUTO: 6 [PH]
PLATELET # BLD AUTO: 400 X10*3/UL (ref 150–450)
POTASSIUM SERPL-SCNC: 4.2 MMOL/L (ref 3.5–5.3)
PR INTERVAL: 110 MS
PROT UR STRIP.AUTO-MCNC: NEGATIVE MG/DL
PROTHROMBIN TIME: 10.2 SECONDS (ref 9.8–12.8)
Q ONSET: 222 MS
QRS COUNT: 14 BEATS
QRS DURATION: 82 MS
QT INTERVAL: 382 MS
QTC CALCULATION(BAZETT): 448 MS
QTC FREDERICIA: 425 MS
R AXIS: 83 DEGREES
RBC # BLD AUTO: 4.68 X10*6/UL (ref 4.5–5.9)
RBC # UR STRIP.AUTO: ABNORMAL /UL
RBC #/AREA URNS AUTO: ABNORMAL /HPF
SODIUM SERPL-SCNC: 137 MMOL/L (ref 136–145)
SP GR UR STRIP.AUTO: 1.01
SQUAMOUS #/AREA URNS AUTO: ABNORMAL /HPF
T AXIS: 59 DEGREES
T OFFSET: 413 MS
UROBILINOGEN UR STRIP.AUTO-MCNC: NORMAL MG/DL
VENTRICULAR RATE: 83 BPM
WBC # BLD AUTO: 9.8 X10*3/UL (ref 4.4–11.3)
WBC #/AREA URNS AUTO: ABNORMAL /HPF

## 2024-06-12 PROCEDURE — 36415 COLL VENOUS BLD VENIPUNCTURE: CPT

## 2024-06-12 PROCEDURE — 87081 CULTURE SCREEN ONLY: CPT | Mod: AHULAB | Performed by: NURSE PRACTITIONER

## 2024-06-12 PROCEDURE — 93010 ELECTROCARDIOGRAM REPORT: CPT | Performed by: STUDENT IN AN ORGANIZED HEALTH CARE EDUCATION/TRAINING PROGRAM

## 2024-06-12 PROCEDURE — 85730 THROMBOPLASTIN TIME PARTIAL: CPT

## 2024-06-12 PROCEDURE — 85610 PROTHROMBIN TIME: CPT

## 2024-06-12 PROCEDURE — 93005 ELECTROCARDIOGRAM TRACING: CPT | Performed by: NURSE PRACTITIONER

## 2024-06-12 PROCEDURE — 80048 BASIC METABOLIC PNL TOTAL CA: CPT

## 2024-06-12 PROCEDURE — 85027 COMPLETE CBC AUTOMATED: CPT

## 2024-06-12 PROCEDURE — 81001 URINALYSIS AUTO W/SCOPE: CPT

## 2024-06-12 RX ORDER — CHLORHEXIDINE GLUCONATE ORAL RINSE 1.2 MG/ML
SOLUTION DENTAL
Qty: 475 ML | Refills: 0 | Status: SHIPPED | OUTPATIENT
Start: 2024-06-12

## 2024-06-12 ASSESSMENT — ENCOUNTER SYMPTOMS
PALPITATIONS: 0
MYALGIAS: 0
NECK STIFFNESS: 1
DIARRHEA: 0
DYSPNEA AT REST: 0
NEUROLOGICAL NEGATIVE: 1
DYSPNEA WITH EXERTION: 0
ABDOMINAL PAIN: 0
BRUISES/BLEEDS EASILY: 0
CONSTIPATION: 0
CONSTITUTIONAL NEGATIVE: 1

## 2024-06-12 NOTE — CPM/PAT H&P
[facility]/PAT Evaluation       Name: Shakila Shah (Shakila Shah)  /Age: 1975/48 y.o.         Date of Consult: 24     Referring Provider: Dr. Dawkins    Surgery, Date, and Length: Penile Prosthesis Insertion, 24, 90 min    Shakila Shah is a 48 year-old female who presents to the [facility] for perioperative risk assessment prior to surgery.    Patient presents with a primary diagnosis of erectile dysfunction after radical prostatectomy.   He is not able to have erections sufficient for intercourse. He is taking daily and OD dose of Cialis and Viagra with no improvement.  Tried AKIRA.  Doppler showed Veno-occlusive disease,  Started on trimix #5 at last visit. Not working well     This note was created in part upon personal review of patient's medical records.      Patient is scheduled to have a Penile Prosthesis Insertion.    Pt denies any past history of anesthetic complications such as PONV, awareness, prolonged sedation, dental damage, aspiration, cardiac arrest, difficult intubation, difficult I.V. access or unexpected hospital admissions.  NO malignant hyperthermia and or pseudocholinesterase deficiency.  No history of blood transfusions     The patient is not a [employer] and will accept blood and blood products if medically indicated.       Past Medical History:   Diagnosis Date    Anxiety and depression     Diverticulosis of colon     Erectile dysfunction after radical prostatectomy     GERD (gastroesophageal reflux disease)     Hypertension     OA (osteoarthritis)     Overweight     Prostate CA (Multi)     s/p prostatectomy    SCC (squamous cell carcinoma)        Past Surgical History:   Procedure Laterality Date    ANKLE FRACTURE SURGERY Left     PROSTATE BIOPSY  2022    MRI FUSION PROSTATE BIOPSY    PROSTATECTOMY  2022    Bilateral nerve-sparing radical prostatectomy with bilateral pelvic lymph node dissection         Family History   Problem Relation Name Age of  Onset    Heart failure Mother      No Known Problems Father      Heart failure Other      Breast cancer Other Grandmother      Social History     Socioeconomic History    Marital status: Single     Spouse name: Not on file    Number of children: Not on file    Years of education: Not on file    Highest education level: Not on file   Occupational History    Not on file   Tobacco Use    Smoking status: Every Day     Current packs/day: 1.00     Average packs/day: 1 pack/day for 28.4 years (28.4 ttl pk-yrs)     Types: Cigarettes     Start date: 1996    Smokeless tobacco: Never    Tobacco comments:     Pt has reduced amount.   Vaping Use    Vaping status: Never Used   Substance and Sexual Activity    Alcohol use: Not Currently     Comment: quit drinking    Drug use: Not Currently     Types: Marijuana     Comment: no longer uses marijuana    Sexual activity: Defer   Other Topics Concern    Not on file   Social History Narrative    Not on file     Social Determinants of Health     Financial Resource Strain: Not on file   Food Insecurity: Not on file   Transportation Needs: Not on file   Physical Activity: Not on file   Stress: Not on file   Social Connections: Not on file   Intimate Partner Violence: Not on file   Housing Stability: Not on file          No Known Allergies      Current Outpatient Medications:     acetaminophen (Tylenol) 500 mg tablet, TAKE TWO TABLETS BY MOUTH EVERY 8 HOURS AS NEEDED FOR PAIN (VIAL), Disp: 180 tablet, Rfl: 11    amLODIPine (Norvasc) 10 mg tablet, TAKE ONE TABLET BY MOUTH DAILY AT 9AM, Disp: 90 tablet, Rfl: 1    buPROPion XL (Wellbutrin XL) 300 mg 24 hr tablet, TAKE ONE TABLET BY MOUTH DAILY AT 9AM, Disp: 90 tablet, Rfl: 1    celecoxib (CeleBREX) 100 mg capsule, Take 1 capsule (100 mg) by mouth 2 times a day., Disp: , Rfl:     dicyclomine (Bentyl) 10 mg capsule, Take 1 capsule (10 mg) by mouth 3 times a day., Disp: 270 capsule, Rfl: 1    ergocalciferol (Vitamin D2) 1.25 MG (01042 UT)  capsule, Take 1 capsule (1,250 mcg) by mouth 1 (one) time per week for 12 doses., Disp: 12 capsule, Rfl: 0    fluticasone (Flonase) 50 mcg/actuation nasal spray, Administer 1 spray into each nostril once daily. Shake gently. Before first use, prime pump. After use, clean tip and replace cap., Disp: 16 g, Rfl: 5    gabapentin (Neurontin) 300 mg capsule, Take 1 capsule (300 mg) by mouth 3 times a day. Take it night for 5 days, then increase to am and pm for 5 days and then three times a day, Disp: 90 capsule, Rfl: 1    losartan-hydrochlorothiazide (Hyzaar) 100-25 mg tablet, Take 1 tablet by mouth once daily., Disp: 90 tablet, Rfl: 3    mirtazapine (Remeron) 7.5 mg tablet, Take 1 tablet (7.5 mg) by mouth once daily at bedtime., Disp: , Rfl:     naltrexone (Depade) 50 mg tablet, Take 1 tablet (50 mg) by mouth once daily., Disp: , Rfl:     nortriptyline (Pamelor) 25 mg capsule, Take 1 capsule (25 mg) by mouth once daily at bedtime. Inc to 2 caps after 1 month, Disp: 60 capsule, Rfl: 5    pantoprazole (ProtoNix) 40 mg EC tablet, Take 1 tablet (40 mg) by mouth 2 times a day. Do not crush, chew, or split., Disp: 180 tablet, Rfl: 1    sildenafil (Viagra) 100 mg tablet, TAKE ONE TABLET BY MOUTH DAILY AS NEEDED FOR COITUS, Disp: 10 tablet, Rfl: 5    sildenafil (Viagra) 100 mg tablet, TAKE ONE TABLET BY MOUTH DAILY AS NEEDED FOR COITUS (VIAL), Disp: 10 tablet, Rfl: 11    tamsulosin (Flomax) 0.4 mg 24 hr capsule, Take 1 capsule (0.4 mg) by mouth once daily., Disp: 90 capsule, Rfl: 1    Vraylar 1.5 mg capsule, Take 1 capsule (1.5 mg) by mouth once daily., Disp: , Rfl:     chlorhexidine (Peridex) 0.12 % solution, Swish for 30 seconds and spit 15mL of solution the night before and morning of surgery, Disp: 475 mL, Rfl: 0    lidocaine-prilocaine (Emla) 2.5-2.5 % cream, To be applied BID prn (Patient not taking: Reported on 6/12/2024), Disp: 30 g, Rfl: 2    miscellaneous medical supply (Blood Pressure Cuff) misc, To be used daily,  "Disp: 1 each, Rfl: 0    polyethylene glycol (Glycolax, Miralax) 17 gram/dose powder, Take 17 g by mouth if needed., Disp: , Rfl:         PAT ROS:   Constitutional:   neg    Neuro/Psych:   neg    Eyes:    use of corrective lenses  Ears:    no hearing aides  Nose:   Mouth:   neg    Throat:   neg    Neck:    neck stiffness  Cardio:    Functional 4 Mets. Patient denies SOB walking up 2 flights of stairs  Walking     no chest pain   no palpitations   no dyspnea   no SÁNCHEZ  Respiratory:   Endocrine:   GI:    no abdominal pain   no constipation   no diarrhea  :   neg    Musculoskeletal:    Neck, Lower and upper back pain  Left ankle pain  Fingers/bottoms of feet pain   no myalgias  Hematologic:    does not bruise/bleed easily   no history of blood transfusion   no blood clots  Skin:  neg        Physical Exam  Physical exam within normal limits.          PAT AIRWAY:   Airway:     Mallampati::  III    Neck ROM::  Limited   No broken teeth, no dentures and no missing teeth          Visit Vitals  /89   Pulse 84   Temp 37.1 °C (98.8 °F)   Resp 18   Ht 1.78 m (5' 10.08\")   Wt 81.6 kg (179 lb 14.3 oz)   SpO2 100%   BMI 25.75 kg/m²   Smoking Status Every Day   BSA 2.01 m²        Plan    Cardiovascular:  Patient denies any chest pain, tightness, heaviness, pressure, radiating pain, palpitations, irregular heartbeats, lightheadedness, cough, congestion, shortness of breath, SÁNCHEZ, PND, near syncope, weight loss or gain.     HTN:   Amlodipine-take dos   Losartan-hydrochlorothiazide-patient to stop one day prior to surgery.     EKG in PAT on 06/12/24   Sinus rhythm with sinus arrhythmia with shortened AL interval @ 83 bpm.     RCRI: 0 Risk of Mace: 3.9%    Pulm:  Denies shortness of breath or activity intolerance.     Stop Bang= 2 (male, htn)    GI/:  GERD:   Pantoprazole-patient to take dos  Diverticulosis  Patient to stop Bentyl one day prior to surgery.    Heme:  Patient instructed to ambulate as soon as possible " postoperatively to decrease thromboembolic risk.    Initiate mechanical DVT prophylaxis as soon as possible and initiate chemical prophylaxis when deemed safe from a bleeding standpoint post surgery.    Caprini=6    Risk assessment complete.  Patient is scheduled for an intermediate surgical risk procedure.    Labs/testing obtained in PAT on 06/12/24  CBC, BMP, Coag, Urine, MRSA, EKG    Lab Results   Component Value Date    WBC 9.8 06/12/2024    HGB 14.3 06/12/2024    HCT 44.3 06/12/2024    MCV 95 06/12/2024     06/12/2024      Lab Results   Component Value Date    GLUCOSE 85 06/12/2024    CALCIUM 9.0 06/12/2024     06/12/2024    K 4.2 06/12/2024    CO2 25 06/12/2024     06/12/2024    BUN 13 06/12/2024    CREATININE 1.33 (H) 06/12/2024      Lab Results   Component Value Date    INR 0.9 06/12/2024    INR 1.0 01/09/2024    INR 0.9 10/28/2022    PROTIME 10.2 06/12/2024    PROTIME 11.0 01/09/2024    PROTIME 9.9 10/28/2022    CBC, BMP, coag reviewed. CR slightly elevated, patient has had similar results previously. Other wise unremarkable.     Follow up: Urine and MRSA      Preoperative medication instructions were provided and reviewed with the patient.  Any additional testing or evaluation was explained to the patient.  Nothing by mouth instructions were discussed and patient's questions were answered prior to conclusion to this encounter.  Patient verbalized understanding of preoperative instructions given in preadmission testing; discharge instructions available in EMR.    This note was dictated with speech recognition.  Minor errors may have been detected during use of speech recognition.

## 2024-06-12 NOTE — CPM/PAT NURSE NOTE
CPM/PAT Nurse Note      Name: Shakila Shah (Shakila Shah)  /Age: 1975/48 y.o.       Past Medical History:   Diagnosis Date    Anxiety and depression     Diverticulosis of colon     Erectile dysfunction after radical prostatectomy     GERD (gastroesophageal reflux disease)     Hypertension     OA (osteoarthritis)     Overweight     Prostate CA (Multi)     s/p prostatectomy    SCC (squamous cell carcinoma)        Past Surgical History:   Procedure Laterality Date    ANKLE FRACTURE SURGERY Left     PROSTATE BIOPSY  2022    MRI FUSION PROSTATE BIOPSY    PROSTATECTOMY  2022    Bilateral nerve-sparing radical prostatectomy with bilateral pelvic lymph node dissection       Patient Sexual activity questions deferred to the physician.    Family History   Problem Relation Name Age of Onset    Heart failure Mother      No Known Problems Father      Heart failure Other      Breast cancer Other Grandmother        No Known Allergies    Prior to Admission medications    Medication Sig Start Date End Date Taking? Authorizing Provider   acetaminophen (Tylenol) 500 mg tablet TAKE TWO TABLETS BY MOUTH EVERY 8 HOURS AS NEEDED FOR PAIN (VIAL) 24   Jay Hastings MD   amLODIPine (Norvasc) 10 mg tablet TAKE ONE TABLET BY MOUTH DAILY AT 9AM 6/3/24   Jay Hastings MD   buPROPion XL (Wellbutrin XL) 300 mg 24 hr tablet TAKE ONE TABLET BY MOUTH DAILY AT 9AM 6/3/24   Jay Hastings MD   celecoxib (CeleBREX) 100 mg capsule Take 1 capsule (100 mg) by mouth 2 times a day. 23   Historical Provider, MD   chlorhexidine (Peridex) 0.12 % solution Swish for 30 seconds and spit 15mL of solution the night before and morning of surgery 24   JOEL Juarez-CNP   dicyclomine (Bentyl) 10 mg capsule Take 1 capsule (10 mg) by mouth 3 times a day. 3/4/24   Jay Hastings MD   ergocalciferol (Vitamin D2) 1.25 MG (67318 UT) capsule Take 1 capsule (1,250 mcg) by mouth 1 (one) time per week for 12 doses. 24  7/22/24  Naomi Ware MD   fluticasone (Flonase) 50 mcg/actuation nasal spray Administer 1 spray into each nostril once daily. Shake gently. Before first use, prime pump. After use, clean tip and replace cap. 12/20/23 12/19/24  Jay Hastings MD   gabapentin (Neurontin) 300 mg capsule Take 1 capsule (300 mg) by mouth 3 times a day. Take it night for 5 days, then increase to am and pm for 5 days and then three times a day 4/4/24 4/4/25  Naomi Ware MD   lidocaine-prilocaine (Emla) 2.5-2.5 % cream To be applied BID prn  Patient not taking: Reported on 6/12/2024 11/16/23   Jay Hastings MD   losartan-hydrochlorothiazide (Hyzaar) 100-25 mg tablet Take 1 tablet by mouth once daily. 3/4/24 3/4/25  Jay Hastings MD   mirtazapine (Remeron) 7.5 mg tablet Take 1 tablet (7.5 mg) by mouth once daily at bedtime. 11/14/23   Historical Provider, MD   miscellaneous medical supply (Blood Pressure Cuff) misc To be used daily 11/16/23   Jay Hastings MD   naltrexone (Depade) 50 mg tablet Take 1 tablet (50 mg) by mouth once daily.    Historical Provider, MD   nortriptyline (Pamelor) 25 mg capsule Take 1 capsule (25 mg) by mouth once daily at bedtime. Inc to 2 caps after 1 month 3/4/24 2/27/25  Jay Hastings MD   pantoprazole (ProtoNix) 40 mg EC tablet Take 1 tablet (40 mg) by mouth 2 times a day. Do not crush, chew, or split. 2/27/24 8/25/24  Jay Hastings MD   polyethylene glycol (Glycolax, Miralax) 17 gram/dose powder Take 17 g by mouth if needed. 6/6/23   Historical Provider, MD   sildenafil (Viagra) 100 mg tablet TAKE ONE TABLET BY MOUTH DAILY AS NEEDED FOR COITUS 6/6/24   Regino Lee MD MPH   sildenafil (Viagra) 100 mg tablet TAKE ONE TABLET BY MOUTH DAILY AS NEEDED FOR COITUS (VIAL) 6/6/24   Regino Lee MD MPH   tamsulosin (Flomax) 0.4 mg 24 hr capsule Take 1 capsule (0.4 mg) by mouth once daily. 3/4/24   Jay Hastings MD   Vraylar 1.5 mg capsule Take 1 capsule (1.5 mg) by mouth  once daily. 11/7/23   Historical Provider, MD   sildenafil (Viagra) 100 mg tablet TAKE ONE TABLET BY MOUTH DAILY AS NEEDED FOR COITUS (VIAL) 4/30/24 6/6/24  Regino Lee MD MPH        PAT ROS     DASI Risk Score    No data to display       Caprini DVT Assessment    No data to display       Modified Frailty Index    No data to display       CHADS2 Stroke Risk  Current as of about an hour ago        N/A 3 to 100%: High Risk   2 to < 3%: Medium Risk   0 to < 2%: Low Risk     Last Change: N/A          This score determines the patient's risk of having a stroke if the patient has atrial fibrillation.        This score is not applicable to this patient. Components are not calculated.          Revised Cardiac Risk Index    No data to display       Apfel Simplified Score    No data to display       Risk Analysis Index Results This Encounter    No data found in the last 1 encounters.         Nurse Plan of Action:     After Visit Summary (AVS) reviewed and patient verbalized good understanding of medications and NPO instructions.  Pre-op infection prevention measures:  CHG showers and mouthwash reviewed, understanding voiced.  CHG soap given and patient verbalized need to pick CHG mouthwash at their preferred local pharmacy.

## 2024-06-12 NOTE — PREPROCEDURE INSTRUCTIONS
Medication List            Accurate as of June 12, 2024  8:22 AM. Always use your most recent med list.                acetaminophen 500 mg tablet  Commonly known as: Tylenol  TAKE TWO TABLETS BY MOUTH EVERY 8 HOURS AS NEEDED FOR PAIN (VIAL)  Notes to patient: May take on the day of surgery if needed.      amLODIPine 10 mg tablet  Commonly known as: Norvasc  TAKE ONE TABLET BY MOUTH DAILY AT 9AM  Medication Adjustments for Surgery: Take morning of surgery with sip of water, no other fluids     Blood Pressure Cuff misc  Generic drug: miscellaneous medical supply  To be used daily     buPROPion  mg 24 hr tablet  Commonly known as: Wellbutrin XL  TAKE ONE TABLET BY MOUTH DAILY AT 9AM  Medication Adjustments for Surgery: Take morning of surgery with sip of water, no other fluids     celecoxib 100 mg capsule  Commonly known as: CeleBREX  Medication Adjustments for Surgery: Stop 7 days before surgery     chlorhexidine 0.12 % solution  Commonly known as: Peridex  Swish for 30 seconds and spit 15mL of solution the night before and morning of surgery     dicyclomine 10 mg capsule  Commonly known as: Bentyl  Take 1 capsule (10 mg) by mouth 3 times a day.  Medication Adjustments for Surgery: Stop 1 day before surgery     ergocalciferol 1.25 MG (26624 UT) capsule  Commonly known as: Vitamin D2  Take 1 capsule (1,250 mcg) by mouth 1 (one) time per week for 12 doses.  Medication Adjustments for Surgery: Stop 1 day before surgery     fluticasone 50 mcg/actuation nasal spray  Commonly known as: Flonase  Administer 1 spray into each nostril once daily. Shake gently. Before first use, prime pump. After use, clean tip and replace cap.  Notes to patient: May use morning of surgery.      gabapentin 300 mg capsule  Commonly known as: Neurontin  Take 1 capsule (300 mg) by mouth 3 times a day. Take it night for 5 days, then increase to am and pm for 5 days and then three times a day  Notes to patient: Take as directed      lidocaine-prilocaine 2.5-2.5 % cream  Commonly known as: Emla  To be applied BID prn  Medication Adjustments for Surgery: Continue until night before surgery     losartan-hydrochlorothiazide 100-25 mg tablet  Commonly known as: Hyzaar  Take 1 tablet by mouth once daily.  Medication Adjustments for Surgery: Stop 1 day before surgery     mirtazapine 7.5 mg tablet  Commonly known as: Remeron  Medication Adjustments for Surgery: Take morning of surgery with sip of water, no other fluids     naltrexone 50 mg tablet  Commonly known as: Depade  Medication Adjustments for Surgery: Stop 1 day before surgery     nortriptyline 25 mg capsule  Commonly known as: Pamelor  Take 1 capsule (25 mg) by mouth once daily at bedtime. Inc to 2 caps after 1 month  Notes to patient: May take the evening before surgery.      pantoprazole 40 mg EC tablet  Commonly known as: ProtoNix  Take 1 tablet (40 mg) by mouth 2 times a day. Do not crush, chew, or split.  Medication Adjustments for Surgery: Take morning of surgery with sip of water, no other fluids     polyethylene glycol 17 gram/dose powder  Commonly known as: Glycolax, Miralax  Medication Adjustments for Surgery: Stop 1 day before surgery     * sildenafil 100 mg tablet  Commonly known as: Viagra  TAKE ONE TABLET BY MOUTH DAILY AS NEEDED FOR COITUS  Medication Adjustments for Surgery: Stop 3 days before surgery     * sildenafil 100 mg tablet  Commonly known as: Viagra  TAKE ONE TABLET BY MOUTH DAILY AS NEEDED FOR COITUS (VIAL)  Medication Adjustments for Surgery: Stop 3 days before surgery     tamsulosin 0.4 mg 24 hr capsule  Commonly known as: Flomax  Take 1 capsule (0.4 mg) by mouth once daily.  Medication Adjustments for Surgery: Take morning of surgery with sip of water, no other fluids     Vraylar 1.5 mg capsule  Generic drug: cariprazine  Medication Adjustments for Surgery: Take morning of surgery with sip of water, no other fluids           * This list has 2 medication(s) that  are the same as other medications prescribed for you. Read the directions carefully, and ask your doctor or other care provider to review them with you.                            **Concerning above medication instructions, if medication is normally taken at night, continue normal schedule.**  **DO NOT TAKE NIGHT PRIOR AND MORNING OF SURGERY**    CONTACT SURGEON'S OFFICE IF YOU DEVELOP:  * Fever = 100.4 F   * New respiratory symptoms (e.g. cough, shortness of breath, respiratory distress, sore throat)  * Recent loss of taste or smell  *Flu like symptoms such as headache, fatigue or gastrointestinal symptoms  * You develop any open sores, shingles, burning or painful urination   AND/OR:  * You no longer wish to have the surgery.  * Any other personal circumstances change that may lead to the need to cancel or defer this surgery.  *You were admitted to any hospital within one week of your planned procedure.    SMOKING:  *Quitting smoking can make a huge difference to your health and recovery from surgery.    *If you need help with quitting, call 9-244-QUIT-NOW.    THE DAY BEFORE SURGERY:  *Do not eat any food after midnight the night before surgery.   *You are permitted to drink clear liquids (i.e. water, black coffee (no milk or cream), tea, apple juice and electrolyte drinks (gatorade)) up to 13.5 ounces, up to 2 hours before your arrival time.  *You may chew gum until 2 hours before your surgery    SURGICAL TIME  *You will be contacted between 2 p.m. and 6 p.m. the business day before your surgery with your arrival time.  *If you haven't received a call by 6pm, call [phone].  *Scheduled surgery times may change and you will be notified if this occurs-check your personal voicemail for any updates.    ON THE MORNING OF SURGERY:  *Wear comfortable, loose fitting clothing.   *Do not use moisturizers, creams, lotions or perfume.  *All jewelry and valuables should be left at home.  *Prosthetic devices such as  contact lenses, hearing aids, dentures, eyelash extensions, hairpins and body piercing must be removed before surgery.    BRING WITH YOU:  *Photo ID and insurance card  *Current list of medicines and allergies  *Pacemaker/Defibrillator/Heart stent cards  *CPAP machine and mask  *Slings/splints/crutches  *Copy of your complete Advanced Directive/DHPOA-if applicable  *Neurostimulator implant remote    PARKING AND ARRIVAL:  *Check in at the Main Entrance desk and let them know you are here for surgery.  *You will be directed to the 2nd floor surgical waiting area.    AFTER OUTPATIENT SURGERY:  *A responsible adult MUST accompany you at the time of discharge and stay with you for 24 hours after your surgery.  *You may NOT drive yourself home after surgery.  *You may use a taxi or ride sharing service ([employer], Uber) to return home ONLY if you are accompanied by a friend or family member.  *Instructions for resuming your medications will be provided by your surgeon.       Patient Information: Oral/Dental Rinse  **This is a prescription; pick it up at your preferred local pharmacy **  What is oral/dental rinse?   It is a mouthwash. It is a way of cleaning the mouth with a germ killing solution before your surgery.  The solution contains chlorhexidine, commonly known as CHG.   It is used inside the mouth to kill a bacteria known as Staphylococcus aureus.  Let your doctor know if you are allergic to Chlorhexidine.    Why do I need to use CHG oral/dental rinse?  The CHG oral/dental rinse helps to kill a bacteria in your mouth known a Staphylococcus aureus.     This reduces the risk of infection at the surgical site.      Using your CHG oral/dental rinse  STEPS:  Use your CHG oral/dental rinse after you brush your teeth the night before (at bedtime) and the morning of your surgery.  Follow all directions on your prescription label.    Use the cap on the container to measure 15ml (fill cap to fill line)  Swish (gargle if you can)  the mouthwash in your mouth for at least 30 seconds, (do not to swallow) spit out  After you use your CHG rinse, do not rinse your mouth with water, drink or eat.  Please refer to prescription label for the appropriate time to resume oral intake  Dental rinse comes in one size bottle: 473ml ~16oz.  You will have leftover    rinse, discard after this use.    What side effects might I have using the CHG oral/dental rinse?  CHG rinse will stick to plaque on the teeth.  Brush and floss just before use.  Teeth brushing will help avoid staining of plaque during use.    Who should I contact if I have questions about the CHG oral/dental rinse?  Please call [facility], Preadmission Testing at [phone] if you have any questions    Home Preoperative Antibacterial Shower     What is a home preoperative antibacterial shower?  This shower is a way of cleaning the skin with a germ killing soap before surgery.  The soap contains chlorhexidine, commonly known as CHG.  CHG is a soap for your skin with germ killing ability.  Let your doctor know if you are allergic to chlorhexidine.    Why do I need to take a preoperative antibacterial shower?  Skin is not sterile.  It is best to try to make your skin as free of germs as possible before surgery.  Proper cleansing with a germ killing soap before surgery can lower the number of germs on your skin.  This helps to reduce the risk of infection at the surgical site.  Following the instructions listed below will help you prepare your skin for surgery.      How do I use the CHG skin cleanser?  Steps:  Begin using your CHG soap five days before your scheduled surgery on ________________________.    Days 1-4 Shower before bed:  Wash your face and genitals with your normal soap and rinse.    Wash and rinse your hair using the CHG soap. Rinse completely, do not condition your   Hair.          3.    Apply the CHG soap to a clean wet washcloth.  Turn the water off  or move away                From the water spray to avoid premature rinsing of the CHG soap as you are applying.     4.   Lather your entire body from the neck down.  Do not use on your face or genitals.   Pay special attention to the area(s) where your incision(s) will be located unless they are on your face.  Avoid scrubbing your skin too hard.  The important point is to have the CHG soap sit on your skin for 3 minutes.    When the 3 minutes are up, turn on the water and rinse the CHG soap off your body completely.   Pat yourself dry with a clean, freshly-laundered towel.  Dress in clean, freshly laundered night clothes.    Be sure to sleep with clean, freshly laundered sheets.  Day 5:  Last shower is the morning before surgery: Follow above Instructions.    NOTE:    *Hair extensions should be removed    *Keep CHG soap out of eyes and ear canals   *DO NOT wash with regular soap on your body after you have used the CHG        soap solution  *DO NOT apply powders, lotions, or perfume.  *Deodorant may be used days 1-4, BUT NOT the day of surgery    Who should I contact if I have any questions regarding the use of CHG soap?  Call the [facility], Preadmission Testing at [phone] if you have any questions.              Patient Information: Pre-Operative Infection Prevention Measures     Why did I have my nose, under my arms and groin swabbed?  The purpose of the swab is to identify Staphylococcus aureus inside your nose or on your skin.  The swab was sent to the laboratory for culture.  A positive swab/culture for Staphylococcus aureus is called colonization or carriage.      What is Staphylococcus aureus?  Staphylococcus aureus, also known as “staph”, is a germ found on the skin or in the nose of healthy people.  Sometimes Staphylococcus aureus can get into the body and cause an infection.  This can be minor (such as pimples, boils or other skin problems).  It might also be serious  (such as blood infection, pneumonia or a surgical site infection).    What is Staphylococcus aureus colonization or carriage?  Colonization or carriage means that a person has the germ but is not sick from it.  These bacteria can be spread on the hands or when breathing or sneezing.    How is Staphylococcus aureus spread?  It is most often spread by close contact with a person or item that carries it.    What happens if my culture is positive for Staphylococcus aureus?  Your doctor/medical team will use this information to guide any antibiotic treatment which may be necessary.  Regardless of the culture results, we will clean the inside of your nose with a betadine swab just before you have your surgery.      Will I get an infection if I have Staphylococcus aureus in my nose or on my skin?  Anyone can get an infection with Staphylococcus aureus.  However, the best way to reduce your risk of infection is to follow the instructions provided to you for the use of your CHG soap and dental rinse.        Who should I contact if I have any questions?  Call the [facility], Preadmission Testing at [phone] if you have any questions.

## 2024-06-14 LAB — STAPHYLOCOCCUS SPEC CULT: ABNORMAL

## 2024-06-18 ENCOUNTER — EVALUATION (OUTPATIENT)
Dept: PHYSICAL THERAPY | Facility: HOSPITAL | Age: 49
End: 2024-06-18
Payer: COMMERCIAL

## 2024-06-18 DIAGNOSIS — C61 MALIGNANT NEOPLASM OF PROSTATE (MULTI): ICD-10-CM

## 2024-06-18 DIAGNOSIS — M47.816 LUMBAR SPONDYLOSIS: ICD-10-CM

## 2024-06-18 DIAGNOSIS — R26.89 IMPAIRMENT OF BALANCE: ICD-10-CM

## 2024-06-18 DIAGNOSIS — M47.812 CERVICAL SPONDYLOSIS: Primary | ICD-10-CM

## 2024-06-18 PROCEDURE — 97162 PT EVAL MOD COMPLEX 30 MIN: CPT | Mod: GP | Performed by: PHYSICAL THERAPIST

## 2024-06-18 ASSESSMENT — ENCOUNTER SYMPTOMS
DEPRESSION: 0
LOSS OF SENSATION IN FEET: 0
OCCASIONAL FEELINGS OF UNSTEADINESS: 1

## 2024-06-18 ASSESSMENT — PAIN DESCRIPTION - DESCRIPTORS: DESCRIPTORS: RADIATING

## 2024-06-18 ASSESSMENT — PAIN - FUNCTIONAL ASSESSMENT: PAIN_FUNCTIONAL_ASSESSMENT: 0-10

## 2024-06-18 ASSESSMENT — PAIN SCALES - GENERAL
PAINLEVEL_OUTOF10: 7
PAINLEVEL_OUTOF10: 8
PAINLEVEL_OUTOF10: 7
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
## 2024-06-24 ENCOUNTER — TREATMENT (OUTPATIENT)
Dept: PHYSICAL THERAPY | Facility: HOSPITAL | Age: 49
End: 2024-06-24
Payer: COMMERCIAL

## 2024-06-24 DIAGNOSIS — M47.816 LUMBAR SPONDYLOSIS: ICD-10-CM

## 2024-06-24 DIAGNOSIS — R26.89 IMPAIRMENT OF BALANCE: ICD-10-CM

## 2024-06-24 DIAGNOSIS — M47.812 CERVICAL SPONDYLOSIS: ICD-10-CM

## 2024-06-24 PROCEDURE — 97110 THERAPEUTIC EXERCISES: CPT | Mod: GP,CQ

## 2024-06-24 NOTE — PROGRESS NOTES
Physical Therapy    Physical Therapy Treatment    Patient Name: Shakila Shah  MRN: [id number]  : 1975   Today's Date: 2024  Time Calculation  Start Time: 0515  Stop Time: 0600  Time Calculation (min): 45 min  Visit #2    PT Therapeutic Procedures Time Entry  Therapeutic Exercise Time Entry: 45          Current Problem  1. Lumbar spondylosis  Follow Up In Physical Therapy      2. Cervical spondylosis  Follow Up In Physical Therapy      3. Impairment of balance  Follow Up In Physical Therapy            Subjective   Pt states pain is in his neck today feeling very stiff       Precautions   STEADI Fall Risk Score (The score of 4 or more indicates an increased risk of falling): 5 ( 1 fall months ago)        Pain   7/10    Objective      Added exercises     Treatments:  Treatments: neck, back pain , balance impairment.    EXERCISES       Date 24      VISIT# #2 # # #    REPS REPS REPS REPS          nustep L2 10'      Pulleys flex  3 '       Cervical retraction   3 sec x10      Cervical stretching   3 sec x10                    pelvic tilt  SKC  bridge  DKTC     3 sec x10   x20             Scapular retraction  Rows  Lat pull down   Green 2 x 10  Green 2 x 10             Balance work in // bars   Marching  Side step  NBOS EO/ EC     2 laps   2 laps  30 sec x 1 ea             TUG       HEP         Assessment:  Pt tolerated exercises with no increased pain. Edu pt on posture with standing and sitting. No LOB with NBOS.       Plan:   Cont with stretching and strengthening to decrease pain and improve balance    OP EDUCATION:       Goals:  Active       PT Problem       PT Goal        Start:  24    Expected End:  24       Patient to be independent with home exercises for cervical and scapular area to increase ROM and stabilization for increased function  Patient to demonstrate awareness of neutral alignment for posture and understand strategies to use for sleeping, standing , sitting and functional  activities to reduce symptoms and / or radiculopathy           PT Goal        Start:  06/18/24    Expected End:  07/30/24       Pain to reduce by 2 pain grades neck and back  Patient to demonstrate increased strength by 1/3 MMT grade in areas of weakness for increased function and reduced pain  Patient to have oswestry  of 4 points or more improvement for increased functional mobility    Pt to demo balance confidence to increase ABC score and reduce fall risk

## 2024-06-25 ENCOUNTER — ANESTHESIA EVENT (OUTPATIENT)
Dept: OPERATING ROOM | Facility: HOSPITAL | Age: 49
End: 2024-06-25
Payer: COMMERCIAL

## 2024-06-25 SDOH — HEALTH STABILITY: MENTAL HEALTH: CURRENT SMOKER: 1

## 2024-06-25 NOTE — ANESTHESIA PREPROCEDURE EVALUATION
Patient: Shakila Shah    Procedure Information       Date/Time: 06/26/24 1030    Procedure: Penile Prosthesis Insertion (Penis)    Location: [facility] OR 01 / [facility] OR    Surgeons: Grant Dawkins MD            Relevant Problems   Cardiac   (+) Essential hypertension      Neuro   (+) Anxiety state   (+) Moderate recurrent major depression (Multi)      GI   (+) Hiatal hernia      /Renal   (+) Malignant neoplasm of prostate (Multi)      Musculoskeletal   (+) Cervical spondylosis   (+) Generalized osteoarthritis   (+) Lumbar spondylosis      Skin   (+) SCC (squamous cell carcinoma)       Clinical information reviewed:                    Past Medical History:   Diagnosis Date    Anxiety and depression     Diverticulosis of colon     Erectile dysfunction after radical prostatectomy     GERD (gastroesophageal reflux disease)     Hypertension     OA (osteoarthritis)     Overweight     Prostate CA (Multi)     s/p prostatectomy    SCC (squamous cell carcinoma)       Past Surgical History:   Procedure Laterality Date    ANKLE FRACTURE SURGERY Left 2021    PROSTATE BIOPSY  07/21/2022    MRI FUSION PROSTATE BIOPSY    PROSTATECTOMY  11/02/2022    Bilateral nerve-sparing radical prostatectomy with bilateral pelvic lymph node dissection     Social History     Tobacco Use    Smoking status: Every Day     Current packs/day: 1.00     Average packs/day: 1 pack/day for 28.5 years (28.5 ttl pk-yrs)     Types: Cigarettes     Start date: 1996    Smokeless tobacco: Never    Tobacco comments:     Pt has reduced amount.   Vaping Use    Vaping status: Never Used   Substance Use Topics    Alcohol use: Not Currently     Comment: quit drinking    Drug use: Not Currently     Types: Marijuana     Comment: no longer uses marijuana      Current Outpatient Medications   Medication Instructions    acetaminophen (Tylenol) 500 mg tablet TAKE TWO TABLETS BY MOUTH EVERY 8 HOURS AS NEEDED FOR PAIN (VIAL)    amLODIPine (NORVASC) 10 mg, oral, Daily,  Take at 9 AM    buPROPion XL (WELLBUTRIN XL) 300 mg, oral, Daily, Take at 9 AM    celecoxib (CeleBREX) 100 mg capsule 1 capsule, oral, 2 times daily    chlorhexidine (Peridex) 0.12 % solution Swish for 30 seconds and spit 15mL of solution the night before and morning of surgery    dicyclomine (BENTYL) 10 mg, oral, 3 times daily    ergocalciferol (VITAMIN D2) 1,250 mcg, oral, Once Weekly    fluticasone (Flonase) 50 mcg/actuation nasal spray 1 spray, Each Nostril, Daily, Shake gently. Before first use, prime pump. After use, clean tip and replace cap.    gabapentin (NEURONTIN) 300 mg, oral, 3 times daily, Take it night for 5 days, then increase to am and pm for 5 days and then three times a day    lidocaine-prilocaine (Emla) 2.5-2.5 % cream To be applied BID prn    losartan-hydrochlorothiazide (Hyzaar) 100-25 mg tablet 1 tablet, oral, Daily    mirtazapine (REMERON) 7.5 mg, oral, Nightly    miscellaneous medical supply (Blood Pressure Cuff) misc To be used daily    naltrexone (DEPADE) 50 mg, oral, Daily    nortriptyline (PAMELOR) 25 mg, oral, Nightly, Inc to 2 caps after 1 month    pantoprazole (PROTONIX) 40 mg, oral, 2 times daily, Do not crush, chew, or split.    polyethylene glycol (Glycolax, Miralax) 17 gram/dose powder Take 17 g by mouth if needed.    sildenafil (Viagra) 100 mg tablet TAKE ONE TABLET BY MOUTH DAILY AS NEEDED FOR COITUS    sildenafil (Viagra) 100 mg tablet TAKE ONE TABLET BY MOUTH DAILY AS NEEDED FOR COITUS (VIAL)    tamsulosin (FLOMAX) 0.4 mg, oral, Daily    Vraylar 1.5 mg, oral, Daily      No Known Allergies     Chemistry    Lab Results   Component Value Date/Time     06/12/2024 0901    K 4.2 06/12/2024 0901     06/12/2024 0901    CO2 25 06/12/2024 0901    BUN 13 06/12/2024 0901    CREATININE 1.33 (H) 06/12/2024 0901    Lab Results   Component Value Date/Time    CALCIUM 9.0 06/12/2024 0901    ALKPHOS 59 04/30/2024 0953    AST 17 04/30/2024 0953    ALT 20 04/30/2024 0953    BILITOT 0.4  "04/30/2024 0953          No results found for: \"HGBA1C\"  Lab Results   Component Value Date/Time    WBC 9.8 06/12/2024 0901    HGB 14.3 06/12/2024 0901    HCT 44.3 06/12/2024 0901     06/12/2024 0901     Lab Results   Component Value Date/Time    PROTIME 10.2 06/12/2024 0901    INR 0.9 06/12/2024 0901     No results found for: \"ABORH\"  Encounter Date: 06/12/24   ECG 12 Lead   Result Value    Ventricular Rate 83    Atrial Rate 83    PA Interval 110    QRS Duration 82    QT Interval 382    QTC Calculation(Bazett) 448    P Axis 73    R Axis 83    T Axis 59    QRS Count 14    Q Onset 222    P Onset 167    P Offset 210    T Offset 413    QTC Fredericia 425    Narrative    Sinus rhythm with sinus arrhythmia with short PA  Otherwise normal ECG  When compared with ECG of 09-JAN-2024 13:13,  PREVIOUS ECG IS PRESENT  Confirmed by Maurizio Tavera ([id number]) on 6/12/2024 4:37:12 PM     No results found for this or any previous visit from the past 1095 days.       Visit Vitals  Smoking Status Every Day     No data recorded     Physical Exam    Airway  Mallampati: II  TM distance: >3 FB  Neck ROM: full     Cardiovascular - normal exam     Dental - normal exam     Pulmonary - normal exam     Abdominal - normal exam              Anesthesia Plan    History of general anesthesia?: yes  History of complications of general anesthesia?: no    ASA 2     general     The patient is a current smoker.    intravenous induction   Postoperative administration of opioids is intended.  Anesthetic plan and risks discussed with patient.  Use of blood products discussed with patient who.    Plan discussed with CRNA.        "

## 2024-06-26 ENCOUNTER — ANESTHESIA (OUTPATIENT)
Dept: OPERATING ROOM | Facility: HOSPITAL | Age: 49
End: 2024-06-26
Payer: COMMERCIAL

## 2024-06-26 ENCOUNTER — HOSPITAL ENCOUNTER (OUTPATIENT)
Facility: HOSPITAL | Age: 49
Setting detail: OUTPATIENT SURGERY
Discharge: HOME | End: 2024-06-26
Attending: UROLOGY | Admitting: UROLOGY
Payer: COMMERCIAL

## 2024-06-26 VITALS
WEIGHT: 175 LBS | RESPIRATION RATE: 18 BRPM | HEIGHT: 70 IN | SYSTOLIC BLOOD PRESSURE: 138 MMHG | OXYGEN SATURATION: 96 % | HEART RATE: 98 BPM | BODY MASS INDEX: 25.05 KG/M2 | DIASTOLIC BLOOD PRESSURE: 79 MMHG | TEMPERATURE: 98 F

## 2024-06-26 VITALS
RESPIRATION RATE: 15 BRPM | HEART RATE: 96 BPM | BODY MASS INDEX: 25.09 KG/M2 | WEIGHT: 175.27 LBS | DIASTOLIC BLOOD PRESSURE: 87 MMHG | SYSTOLIC BLOOD PRESSURE: 138 MMHG | TEMPERATURE: 97.5 F | OXYGEN SATURATION: 98 % | HEIGHT: 70 IN

## 2024-06-26 DIAGNOSIS — N52.31 ERECTILE DYSFUNCTION AFTER RADICAL PROSTATECTOMY: ICD-10-CM

## 2024-06-26 DIAGNOSIS — N52.9 ERECTILE DYSFUNCTION OF ORGANIC ORIGIN: Primary | ICD-10-CM

## 2024-06-26 PROCEDURE — 2720000007 HC OR 272 NO HCPCS: Performed by: UROLOGY

## 2024-06-26 PROCEDURE — 99283 EMERGENCY DEPT VISIT LOW MDM: CPT

## 2024-06-26 PROCEDURE — 2500000004 HC RX 250 GENERAL PHARMACY W/ HCPCS (ALT 636 FOR OP/ED): Performed by: UROLOGY

## 2024-06-26 PROCEDURE — 2780000003 HC OR 278 NO HCPCS: Performed by: UROLOGY

## 2024-06-26 PROCEDURE — 7100000002 HC RECOVERY ROOM TIME - EACH INCREMENTAL 1 MINUTE: Performed by: UROLOGY

## 2024-06-26 PROCEDURE — C1713 ANCHOR/SCREW BN/BN,TIS/BN: HCPCS | Performed by: UROLOGY

## 2024-06-26 PROCEDURE — 3700000002 HC GENERAL ANESTHESIA TIME - EACH INCREMENTAL 1 MINUTE: Performed by: UROLOGY

## 2024-06-26 PROCEDURE — 3600000008 HC OR TIME - EACH INCREMENTAL 1 MINUTE - PROCEDURE LEVEL THREE: Performed by: UROLOGY

## 2024-06-26 PROCEDURE — C1813 PROSTHESIS, PENILE, INFLATAB: HCPCS | Performed by: UROLOGY

## 2024-06-26 PROCEDURE — 2500000001 HC RX 250 WO HCPCS SELF ADMINISTERED DRUGS (ALT 637 FOR MEDICARE OP): Performed by: UROLOGY

## 2024-06-26 PROCEDURE — 2500000004 HC RX 250 GENERAL PHARMACY W/ HCPCS (ALT 636 FOR OP/ED): Performed by: NURSE ANESTHETIST, CERTIFIED REGISTERED

## 2024-06-26 PROCEDURE — L7900 MALE VACUUM ERECTION SYSTEM: HCPCS | Performed by: UROLOGY

## 2024-06-26 PROCEDURE — 7100000010 HC PHASE TWO TIME - EACH INCREMENTAL 1 MINUTE: Performed by: UROLOGY

## 2024-06-26 PROCEDURE — 7100000001 HC RECOVERY ROOM TIME - INITIAL BASE CHARGE: Performed by: UROLOGY

## 2024-06-26 PROCEDURE — 3600000003 HC OR TIME - INITIAL BASE CHARGE - PROCEDURE LEVEL THREE: Performed by: UROLOGY

## 2024-06-26 PROCEDURE — 2500000005 HC RX 250 GENERAL PHARMACY W/O HCPCS: Performed by: NURSE ANESTHETIST, CERTIFIED REGISTERED

## 2024-06-26 PROCEDURE — 3700000001 HC GENERAL ANESTHESIA TIME - INITIAL BASE CHARGE: Performed by: UROLOGY

## 2024-06-26 PROCEDURE — 54405 INSERT MULTI-COMP PENIS PROS: CPT | Performed by: UROLOGY

## 2024-06-26 PROCEDURE — 7100000009 HC PHASE TWO TIME - INITIAL BASE CHARGE: Performed by: UROLOGY

## 2024-06-26 DEVICE — IMPLANTABLE DEVICE: Type: IMPLANTABLE DEVICE | Site: PENIS | Status: FUNCTIONAL

## 2024-06-26 RX ORDER — HYDROCODONE BITARTRATE AND ACETAMINOPHEN 5; 325 MG/1; MG/1
1 TABLET ORAL EVERY 6 HOURS PRN
Qty: 20 TABLET | Refills: 0 | Status: SHIPPED | OUTPATIENT
Start: 2024-06-26 | End: 2024-07-01

## 2024-06-26 RX ORDER — SODIUM CHLORIDE, SODIUM LACTATE, POTASSIUM CHLORIDE, CALCIUM CHLORIDE 600; 310; 30; 20 MG/100ML; MG/100ML; MG/100ML; MG/100ML
100 INJECTION, SOLUTION INTRAVENOUS CONTINUOUS
Status: DISCONTINUED | OUTPATIENT
Start: 2024-06-26 | End: 2024-06-26 | Stop reason: HOSPADM

## 2024-06-26 RX ORDER — ACETAMINOPHEN 325 MG/1
650 TABLET ORAL ONCE
Status: DISCONTINUED | OUTPATIENT
Start: 2024-06-26 | End: 2024-06-26 | Stop reason: HOSPADM

## 2024-06-26 RX ORDER — ACETAMINOPHEN 325 MG/1
650 TABLET ORAL EVERY 4 HOURS PRN
Status: DISCONTINUED | OUTPATIENT
Start: 2024-06-26 | End: 2024-06-26 | Stop reason: HOSPADM

## 2024-06-26 RX ORDER — PROPOFOL 10 MG/ML
INJECTION, EMULSION INTRAVENOUS AS NEEDED
Status: DISCONTINUED | OUTPATIENT
Start: 2024-06-26 | End: 2024-06-26

## 2024-06-26 RX ORDER — OXYCODONE HYDROCHLORIDE 5 MG/1
5 TABLET ORAL EVERY 4 HOURS PRN
Status: DISCONTINUED | OUTPATIENT
Start: 2024-06-26 | End: 2024-06-26 | Stop reason: HOSPADM

## 2024-06-26 RX ORDER — LIDOCAINE HYDROCHLORIDE 10 MG/ML
0.1 INJECTION, SOLUTION EPIDURAL; INFILTRATION; INTRACAUDAL; PERINEURAL ONCE
Status: DISCONTINUED | OUTPATIENT
Start: 2024-06-26 | End: 2024-06-26 | Stop reason: HOSPADM

## 2024-06-26 RX ORDER — POLYETHYLENE GLYCOL 3350 17 G/17G
17 POWDER, FOR SOLUTION ORAL DAILY
Qty: 30 PACKET | Refills: 0 | Status: SHIPPED | OUTPATIENT
Start: 2024-06-26 | End: 2024-07-26

## 2024-06-26 RX ORDER — MIDAZOLAM HYDROCHLORIDE 1 MG/ML
INJECTION INTRAMUSCULAR; INTRAVENOUS AS NEEDED
Status: DISCONTINUED | OUTPATIENT
Start: 2024-06-26 | End: 2024-06-26

## 2024-06-26 RX ORDER — ONDANSETRON HYDROCHLORIDE 2 MG/ML
INJECTION, SOLUTION INTRAVENOUS AS NEEDED
Status: DISCONTINUED | OUTPATIENT
Start: 2024-06-26 | End: 2024-06-26

## 2024-06-26 RX ORDER — FENTANYL CITRATE 50 UG/ML
INJECTION, SOLUTION INTRAMUSCULAR; INTRAVENOUS AS NEEDED
Status: DISCONTINUED | OUTPATIENT
Start: 2024-06-26 | End: 2024-06-26

## 2024-06-26 RX ORDER — GABAPENTIN 300 MG/1
600 CAPSULE ORAL ONCE
Status: COMPLETED | OUTPATIENT
Start: 2024-06-26 | End: 2024-06-26

## 2024-06-26 RX ORDER — NORETHINDRONE AND ETHINYL ESTRADIOL 0.5-0.035
KIT ORAL AS NEEDED
Status: DISCONTINUED | OUTPATIENT
Start: 2024-06-26 | End: 2024-06-26

## 2024-06-26 RX ORDER — LIDOCAINE HYDROCHLORIDE 20 MG/ML
INJECTION, SOLUTION EPIDURAL; INFILTRATION; INTRACAUDAL; PERINEURAL AS NEEDED
Status: DISCONTINUED | OUTPATIENT
Start: 2024-06-26 | End: 2024-06-26

## 2024-06-26 RX ORDER — CHLORHEXIDINE GLUCONATE 40 MG/ML
SOLUTION TOPICAL DAILY PRN
Status: DISCONTINUED | OUTPATIENT
Start: 2024-06-26 | End: 2024-06-26 | Stop reason: HOSPADM

## 2024-06-26 RX ORDER — SULFAMETHOXAZOLE AND TRIMETHOPRIM 800; 160 MG/1; MG/1
1 TABLET ORAL 2 TIMES DAILY
Qty: 14 TABLET | Refills: 0 | Status: SHIPPED | OUTPATIENT
Start: 2024-06-26 | End: 2024-07-03

## 2024-06-26 RX ORDER — PHENYLEPHRINE HCL IN 0.9% NACL 1 MG/10 ML
SYRINGE (ML) INTRAVENOUS AS NEEDED
Status: DISCONTINUED | OUTPATIENT
Start: 2024-06-26 | End: 2024-06-26

## 2024-06-26 RX ORDER — FLUCONAZOLE 2 MG/ML
200 INJECTION, SOLUTION INTRAVENOUS ONCE
Status: COMPLETED | OUTPATIENT
Start: 2024-06-26 | End: 2024-06-26

## 2024-06-26 RX ORDER — VANCOMYCIN HYDROCHLORIDE 1 G/200ML
1000 INJECTION, SOLUTION INTRAVENOUS ONCE
Status: COMPLETED | OUTPATIENT
Start: 2024-06-26 | End: 2024-06-26

## 2024-06-26 ASSESSMENT — PAIN - FUNCTIONAL ASSESSMENT
PAIN_FUNCTIONAL_ASSESSMENT: 0-10

## 2024-06-26 ASSESSMENT — PAIN DESCRIPTION - FREQUENCY: FREQUENCY: CONSTANT/CONTINUOUS

## 2024-06-26 ASSESSMENT — PAIN SCALES - GENERAL
PAINLEVEL_OUTOF10: 0 - NO PAIN
PAINLEVEL_OUTOF10: 0 - NO PAIN
PAINLEVEL_OUTOF10: 8
PAINLEVEL_OUTOF10: 0 - NO PAIN

## 2024-06-26 ASSESSMENT — COLUMBIA-SUICIDE SEVERITY RATING SCALE - C-SSRS
2. HAVE YOU ACTUALLY HAD ANY THOUGHTS OF KILLING YOURSELF?: NO
1. IN THE PAST MONTH, HAVE YOU WISHED YOU WERE DEAD OR WISHED YOU COULD GO TO SLEEP AND NOT WAKE UP?: NO
6. HAVE YOU EVER DONE ANYTHING, STARTED TO DO ANYTHING, OR PREPARED TO DO ANYTHING TO END YOUR LIFE?: NO
1. IN THE PAST MONTH, HAVE YOU WISHED YOU WERE DEAD OR WISHED YOU COULD GO TO SLEEP AND NOT WAKE UP?: NO
6. HAVE YOU EVER DONE ANYTHING, STARTED TO DO ANYTHING, OR PREPARED TO DO ANYTHING TO END YOUR LIFE?: NO
2. HAVE YOU ACTUALLY HAD ANY THOUGHTS OF KILLING YOURSELF?: NO

## 2024-06-26 ASSESSMENT — PAIN DESCRIPTION - PAIN TYPE: TYPE: ACUTE PAIN

## 2024-06-26 ASSESSMENT — PAIN DESCRIPTION - LOCATION: LOCATION: ABDOMEN

## 2024-06-26 ASSESSMENT — PAIN DESCRIPTION - ORIENTATION: ORIENTATION: RIGHT;LOWER

## 2024-06-26 ASSESSMENT — PAIN DESCRIPTION - DESCRIPTORS: DESCRIPTORS: PRESSURE

## 2024-06-26 NOTE — OP NOTE
Penile Prosthesis Insertion Operative Note     Date: 2024  OR Location: [facility] A OR    Name: Shakila Shah, : 1975, Age: 48 y.o., MRN: [id number], Sex: male    Diagnosis  Pre-op Diagnosis     * Erectile dysfunction after radical prostatectomy [N52.31] Post-op Diagnosis     * Erectile dysfunction after radical prostatectomy [N52.31]     Procedures  Penile Prosthesis Insertion  48096 - [place] [facility] MULTI-COMPONENT INFLATABLE PENILE PROSTH      Surgeons      * Grant Dawkins - Primary    Resident/Fellow/Other Assistant:  Surgeons and Role:     * Micky Arndt MD - Resident - Assisting    Procedure Summary  Anesthesia: General  ASA: II  Anesthesia Staff: Anesthesiologist: Jay Reyes MD  CRNA: JOEL Nieves-CRNA  Estimated Blood Loss: 20mL  Intra-op Medications:   Administrations occurring from 1030 to 1200 on 24:   Medication Name Total Dose   lactated Ringer's infusion Cannot be calculated              Anesthesia Record               Intraprocedure I/O Totals          Intake    lactated Ringer's infusion 1000.00 mL    Total Intake 1000 mL       Output    Est. Blood Loss 25 mL    Total Output 25 mL       Net    Net Volume 975 mL          Specimen: No specimens collected     Staff:   Circulator: Kathy Wangub Person: Jamshid García Scrub: Candi García Circulator: Candi         Drains and/or Catheters:   Closed/Suction Drain Right;Anterior RLQ 10 Fr. (Active)   Dressing Status Clean;Dry 24 1328       [REMOVED] Urethral Catheter Double-lumen;Non-latex 16 Fr. (Removed)       Tourniquet Times:         Implants:  Implants       Type Name Action Serial No.       ASSEMBLY KIT  Implanted               Findings: See procedure details. Easy dilation bilaterally with no concern for urethral injury or corporal perforation    Indications: Shakila Shah is an 48 y.o. male who is having surgery for Erectile dysfunction after radical prostatectomy [N52.31].       Procedure Details:    Placement of Coloplast Titan 3-piece inflatable penile prosthesis.     Cylinders:  26 cm, no rear-tip extenders     Reservoir: 125 mL  (120 ml sterile saline in reservoir placed in left sub-rectus space)     The patient was placed on table in supine position.  Anesthesia was administered without incident.  Genitalia was then shaved and then prepped with ChloraPrep after a Hibiclens wash.  Patient was prepped and draped in standard sterile fashion. A bilateral pudendal nerve block was performed with 20 cc of 0.25% marcaine and decadron mix. An artificial erection was then induced using injection of 30 cc of 0.25% Marcaine and 20 cc of injectable saline.     There was no significant Peyronie disease or curvature.  A 16 fr lazo catheter was then placed.   A transverse infrapubic incision was then made using a scalpel and electrocautery and dissected down through dartos down to the corporal bodies bilaterally which were cleared bluntly. 2-0 Monocryl stay sutures were placed in each corporal body, 1 medially and 1 laterally assuring that we were lateral to the dorsal nerve bundles.  A 1.5 cm corporotomy was then made between the stay stitches first on the left side and then on the right.  We then dilated the corpora using 11 Bishop proximally and distally and dilated quite nicely. Using the Kavita device, we then measured our corpora on the left side, it was 10 cm proximal and 16 distal for a total of 26 cm. On the right side, it was 10 cm proximal and 16 cm distal for a total of 26 cm.  The wound was then copiously irrigated with antibiotic irrigation.    Incision was then made to prep the coloplast titan  26 cm implant with no rear-tip extenders as well as 125cc reservoir.    We then turned our attention to development of the space for the reservoir. The decision was then to go on the left side in the left sub-rectus muscle position given the patient's history of a RALP.  We made a counter incision just superior and  lateral to the pubic bone and we then dissected down to the fascia, which is incised. Rectus muscle was encounter which was gently and bluntly split to reveal a subrectus pouch in which we would then place the 125cc reservoir. This was then filled with 115 ml of sterile saline.    We then turned our attention to placement of the individual cylinders.  Once they had been prepped using Luis needle and Kavita device, the cylinders were deployed within the corporal bodies and it seated quite nicely.  A surrogate reservoir test was then performed and tips were symmetrical in mid glans.  No evidence of any crossover perforation or any other pathology.  It seated quite nicely.  Implant was then deflated.  The tubing from the reservoir was connected to the pump after being brought through the infrapubic incision, using the quick connector set. Our pre-placed corporotomy stitches were then tied down with nice closure of the corpora. The cylinder tubings were also subsequently connected the scrotal pump tubing. A subdartos pouch was created for placement of the pump. The pump was placed in a dependent position. A YVONNE drain was placed into the right groin.  We then proceeded with wound closure.  The deeper layers were closed with 2-0 Vicryl followed by a 3-0 Vicryl.  The skin was closed with subcuticular 3-0 Monocryl.  Skin glue was then applied. An island dressing, drain dressing, followed by fluffs and scrotal support.  The patient's Bess catheter was removed.    The patient was awakened and transferred to PACU in stable condition.        Complications:  None; patient tolerated the procedure well.    Disposition: PACU - hemodynamically stable.  Condition: stable           Attending Attestation: I was present and scrubbed for the entire procedure.    Grant Dawkins  Phone Number: [phone]

## 2024-06-26 NOTE — ANESTHESIA POSTPROCEDURE EVALUATION
Patient: Shakila Shah    Procedure Summary       Date: 06/26/24 Room / Location: [facility] A OR 01 / [facility]    Anesthesia Start: 1143 Anesthesia Stop: 1322    Procedure: Penile Prosthesis Insertion (Penis) Diagnosis:       Erectile dysfunction after radical prostatectomy      (Erectile dysfunction after radical prostatectomy [N52.31])    Surgeons: Grant Dawkins MD Responsible Provider: Jay Reyes MD    Anesthesia Type: general ASA Status: 2            Anesthesia Type: general    Vitals Value Taken Time   /87 06/26/24 1410   Temp 36.4 °C (97.5 °F) 06/26/24 1410   Pulse 97 06/26/24 1407   Resp 16 06/26/24 1410   SpO2 98 % 06/26/24 1410   Vitals shown include unfiled device data.    Anesthesia Post Evaluation    Patient participation: complete - patient participated  Level of consciousness: awake  Pain management: adequate  Airway patency: patent  Cardiovascular status: acceptable and hemodynamically stable  Respiratory status: acceptable  Hydration status: acceptable  Postoperative Nausea and Vomiting: none        No notable events documented.

## 2024-06-26 NOTE — DISCHARGE INSTRUCTIONS
[facility]rology [place]    DISCHARGE INSTRUCTIONS     Penile Prosthesis    Shakila Shah    Call Urology Department [phone] during regular daytime business hours (8:00 am - 5:00 pm) and after 5:00 pm ask for the Urology resident with any questions or concerns.      If it is a life-threatening situation, proceed to the nearest emergency department.          Thank you for the opportunity to care for you today.  Your health and healing are very important to us.  We hope we made you feel as comfortable as possible and are committed to your recovery and continued well-being.      The following is a brief overview of your penile prosthesis procedure. Some of the information contained on this summary may be confidential.  This information should be kept in your records and should be shared with your regular doctor.    Physicians:   Dr. Dawkins     Procedure performed: insertion of penile prosthesis    What to Expect During your Recovery and Home Care  Anesthesia Side Effects   You received anesthesia today.  You may feel sleepy, tired, or have a sore throat.   You may also feel drowsiness, dizziness, or inability to think clearly.  For your safety, do not drive, drink alcoholic beverages, take any unprescribed medication or make any important decisions for 24 hours.  A responsible adult should be with you for 24 hours.       Activity and Recovery    Go home and rest. No strenuous activity and no heavy lifting over 10 lbs.     Pain Control  Unfortunately, you may experience pain after your procedure.  Adequate management can include alternative measures to help ease your pain and can include ice packs, scrotal support, and over the counter Tylenol. Norco may also be prescribe for breakthrough pain. Do not take more than 4,000 mg of Tylenol in a 24-hour period.     Norco is a narcotic medication, which can impair judgment, slow reaction times, and cause constipation. Take Miralax, Colace, or other stool softeners for  constipation. Do not drive or operate machinery while taking narcotic medications.    Nausea/Vomiting   Clear liquids are best tolerated at first. Start slow, advance your diet as tolerated to normal foods. Avoid spicy, greasy, heavy foods at first. Also, you may feel nauseous or like you need to vomit if you take any type of medication on an empty stomach.  Call your physician if you are unable to eat or drink and have persistent vomiting.          Signs of Bleeding   Minor bleeding or drainage may occur from the surgical site; however, excessive or consistent bleeding should be reported to your surgeon. Excessive bleeding is defined as blood that is dripping from wound, soaking you bandages, and is ketchup colored, thick with possible blood clots.  Consistent is defined as bleeding that does not stop.    Treatment/wound care:   Keep area(s) clean and dry. You can wear gauze dressing on top of the incisions, so the scrotal support does not irritate the incisions. It is incredibly important that you keep the scrotum well supported for the first two weeks after the surgery. Continue to wear the scrotal support/jock straps/tight fitting underwear.  If you have a drain in place, we will schedule an appointment for the drain to be removed.  The device is purposefully left partially inflated (approximately 30 to 40 percent). This will be deflated at the two week visit.  Pull the scrotal pump straight down towards the ground in the shower when the scrotal skin is supple. You should also do this every time you go to the bathroom This will allow the pump to heal in a nice dependent position.  It is okay to shower 48 hours after time of surgery.  Do not submerge incisions in water (tub bath, swimming) until incisions have completely healed.  Do not scrub wound(s), pat dry.  Clean with mild soap. Do not use oils or lotions on incisions.    Please visually inspect your wound(s) at least once daily.  If the wound(s) are in a  difficult to see location, please use a mirror or have someone else assist with visual inspection.    Signs of Infection  Signs of infection can include fever, excessive swelling, heat, drainage, redness, or severe pain.  If you see any of these occur, please contact [phone]. Any fever higher than 100.4, especially if associated with an ill feeling, abdominal pain, chills, or nausea should be reported to your surgeon.        Additional Instructions:   No sex until discussed further at your follow up appointment. Your device has been left partially inflated, do not deflate, or inflate further. You should wear tight fitting underwear for the next six weeks. At your six week follow up you will be taught how to use your device.

## 2024-06-26 NOTE — ANESTHESIA PROCEDURE NOTES
Airway  Date/Time: 6/26/2024 11:52 AM  Urgency: elective    Airway not difficult    Staffing  Performed: CRNA   Authorized by: Jay Reyes MD    Performed by: JOEL Nieves-ARLENE  Patient location during procedure: OR    Indications and Patient Condition  Indications for airway management: anesthesia  Spontaneous ventilation: present  Sedation level: moderate (conscious sedation)  Preoxygenated: yes  Mask difficulty assessment: 1 - vent by mask  Planned trial extubation    Final Airway Details  Final airway type: supraglottic airway      Successful airway: Size 5     Number of attempts at approach: 1

## 2024-06-26 NOTE — SIGNIFICANT EVENT
Nutrition Plan:    Recommend keeping log for up to 3-4 days and return through [facility] [employer] or Email.   Will send samples of Pediasure Peptide 1.5 to see if easier on stomach to digest and increase absorption of calories provided.   Once food log completed will complete nutrient analysis and consider alternative fat sources or supplements and recommendations specific to blended meals.     Continue current regimen for now with food/diet log.   Continue Pediasure 1.5 5 cans daily.     Continue with free fluids ensuring at least 35 oz fluids daily    Continue additional 10 mL free water flushes with each feed or more depending on consistency of blended meals    Continue daily MVI.   Blenderized Formula Food Group Intake References:     Grains  4 ounces    Fruit  1 cup    Vegetables  1 ½ cups    Milk or Milk Substitute  2 ½ cups   Meat, beans, and Nuts 3 ounces   Fats  4 teaspoons      Follow up in 3-4 weeks.       Holly Knight MS RDN LDN  Pediatric Dietitian  [facility]   A: [address], [place], [place]; [address] - [address]  P: [phone]    Stay Well, Stay Healthy!     PACU phase I

## 2024-06-26 NOTE — H&P
I have reviewed the patient's History and Physical Examination. I have personally seen and evaluated the patient, repeating key portions. There is no significant interval change.     Surgery is still indicated. Yes    Consent reviewed and signed by patient/family: Yes    Operative site verified and marked: Yes  BAILEY Arndt MD, SHRADDHA  Urology, PGY4  Team Pager: [phone]

## 2024-06-27 ENCOUNTER — HOSPITAL ENCOUNTER (EMERGENCY)
Facility: HOSPITAL | Age: 49
Discharge: HOME | End: 2024-06-27
Attending: EMERGENCY MEDICINE
Payer: COMMERCIAL

## 2024-06-27 DIAGNOSIS — T85.698A BROKEN JACKSON-PRATT DRAIN, INITIAL ENCOUNTER: Primary | ICD-10-CM

## 2024-06-27 PROCEDURE — 2500000001 HC RX 250 WO HCPCS SELF ADMINISTERED DRUGS (ALT 637 FOR MEDICARE OP): Performed by: EMERGENCY MEDICINE

## 2024-06-27 RX ORDER — OXYCODONE AND ACETAMINOPHEN 5; 325 MG/1; MG/1
1 TABLET ORAL ONCE
Status: COMPLETED | OUTPATIENT
Start: 2024-06-27 | End: 2024-06-27

## 2024-06-27 RX ADMIN — OXYCODONE HYDROCHLORIDE AND ACETAMINOPHEN 1 TABLET: 5; 325 TABLET ORAL at 02:35

## 2024-06-27 NOTE — ED PROVIDER NOTES
Shakila Shah is a 48 y.o. patient presenting to the ED for problems with YVONNE drain not working.  The patient states he underwent surgery earlier today.  He has been doing well except the drain does not seem to be working correctly.  He does have some bleeding around his incision site.  He denies any other concerns.    Additional History Obtained from: None  Limitations to History: None  ------------------------------------------------------------------------------------------------------------------------------------------  Physical Exam:  Appearance: Alert, cooperative,   Skin: Warm, dry, appropriate color for ethnicity.  Pulmonary: No accessory muscle use or stridor. Clear lung sounds bilaterally without rhonchi or wheezing.   Cardiac: Heart sounds regular without murmur. B/L radial pulses full and symmetric.   Abdomen: Soft, not tender.  No rebound or guarding.  Suprapubic incision site is clean and dry.  There is some bloody discharge on the dressing around this.  YVONNE drain is fully expanded and not holding a seal.  There is a small amount of bloody drainage in this as well.  Neurological: Face symmetrical. Voice clear. Appropriately conversant.   Psychiatric: Appropriate mood and affect.    Medical Decision Making:  Chronic Medical Conditions Significantly Affecting Care:  has a past medical history of Anxiety and depression, Diverticulosis of colon, Erectile dysfunction after radical prostatectomy, GERD (gastroesophageal reflux disease), Hypertension, OA (osteoarthritis), Overweight, Prostate CA (Multi), and SCC (squamous cell carcinoma).    Social Determinants of Health Significantly Affecting Care: None identified    Differential Diagnosis Considered but not limited to: Patient with YVONNE drain is not holding seal.  It appears that the rubber gasket is torn.  The drain was replaced with a new bulb after which it seems to be working normally.  It did drain some bloody, thin fluid afterwards.  The patient does  have follow-up scheduled with urology that he is encouraged to keep.  I do believe he is stable for outpatient management.    Postop note from yesterday was reviewed by me.     Diagnoses as of 06/27/24 0203   Broken Mychal-Campuzano drain, initial encounter          Hilda Jo, DO  06/27/24 0206

## 2024-06-28 ENCOUNTER — APPOINTMENT (OUTPATIENT)
Dept: UROLOGY | Facility: HOSPITAL | Age: 49
End: 2024-06-28
Payer: COMMERCIAL

## 2024-06-28 DIAGNOSIS — N52.31 ERECTILE DYSFUNCTION AFTER RADICAL PROSTATECTOMY: Primary | ICD-10-CM

## 2024-06-28 PROCEDURE — 99024 POSTOP FOLLOW-UP VISIT: CPT | Performed by: NURSE PRACTITIONER

## 2024-06-28 NOTE — PROGRESS NOTES
[facility]  Outpatient Clinic Note    Subjective   Shakila Shah is a 48 y.o. male    History of Present Illness   Patient presenting to clinic today for drain removal s/p IPP with Dr. Dawkins 6/26/2024  History of Radical prostatectomy, ED refractory to pde5i. Patient presented to ED 6/27/2024 with  Concern that YVONNE drain not draining properly. Drain was not holding seal, appeared that rubber   Gasket torn, drain was replaced with a new bulb. He reports he has been doing well since surgery.   Total drain output since procedure approx 100 ml. He is eating and drinking, as normal. Urine has remained clear, yellow. Bowels have returned to normal. No drainage from incision site. He is ambulating at baseline. No fevers or chills.      Past Medical History and Surgical History   Past Medical History:   Diagnosis Date    Anxiety and depression     Diverticulosis of colon     Erectile dysfunction after radical prostatectomy     GERD (gastroesophageal reflux disease)     Hypertension     OA (osteoarthritis)     Overweight     Prostate CA (Multi)     s/p prostatectomy    SCC (squamous cell carcinoma)      Past Surgical History:   Procedure Laterality Date    ANKLE FRACTURE SURGERY Left 2021    PROSTATE BIOPSY  07/21/2022    MRI FUSION PROSTATE BIOPSY    PROSTATECTOMY  11/02/2022    Bilateral nerve-sparing radical prostatectomy with bilateral pelvic lymph node dissection       Medications  Current Outpatient Medications on File Prior to Visit   Medication Sig Dispense Refill    acetaminophen (Tylenol) 500 mg tablet TAKE TWO TABLETS BY MOUTH EVERY 8 HOURS AS NEEDED FOR PAIN (VIAL) 180 tablet 11    amLODIPine (Norvasc) 10 mg tablet TAKE ONE TABLET BY MOUTH DAILY AT 9AM 90 tablet 1    buPROPion XL (Wellbutrin XL) 300 mg 24 hr tablet TAKE ONE TABLET BY MOUTH DAILY AT 9AM 90 tablet 1    celecoxib (CeleBREX) 100 mg capsule Take 1 capsule (100 mg) by mouth 2 times a day.      dicyclomine (Bentyl) 10 mg capsule Take 1 capsule  (10 mg) by mouth 3 times a day. 270 capsule 1    ergocalciferol (Vitamin D2) 1.25 MG (82285 UT) capsule Take 1 capsule (1,250 mcg) by mouth 1 (one) time per week for 12 doses. 12 capsule 0    fluticasone (Flonase) 50 mcg/actuation nasal spray Administer 1 spray into each nostril once daily. Shake gently. Before first use, prime pump. After use, clean tip and replace cap. 16 g 5    gabapentin (Neurontin) 300 mg capsule Take 1 capsule (300 mg) by mouth 3 times a day. Take it night for 5 days, then increase to am and pm for 5 days and then three times a day 90 capsule 1    HYDROcodone-acetaminophen (Norco) 5-325 mg tablet Take 1 tablet by mouth every 6 hours if needed for severe pain (7 - 10) for up to 5 days. 20 tablet 0    losartan-hydrochlorothiazide (Hyzaar) 100-25 mg tablet Take 1 tablet by mouth once daily. 90 tablet 3    mirtazapine (Remeron) 7.5 mg tablet Take 1 tablet (7.5 mg) by mouth once daily at bedtime.      miscellaneous medical supply (Blood Pressure Cuff) misc To be used daily 1 each 0    naltrexone (Depade) 50 mg tablet Take 1 tablet (50 mg) by mouth once daily.      nortriptyline (Pamelor) 25 mg capsule Take 1 capsule (25 mg) by mouth once daily at bedtime. Inc to 2 caps after 1 month 60 capsule 5    pantoprazole (ProtoNix) 40 mg EC tablet Take 1 tablet (40 mg) by mouth 2 times a day. Do not crush, chew, or split. 180 tablet 1    polyethylene glycol (Glycolax, Miralax) 17 gram packet Take 17 g by mouth once daily. 30 packet 0    polyethylene glycol (Glycolax, Miralax) 17 gram/dose powder Take 17 g by mouth if needed.      sulfamethoxazole-trimethoprim (Bactrim DS) 800-160 mg tablet Take 1 tablet by mouth 2 times a day for 7 days. 14 tablet 0    tamsulosin (Flomax) 0.4 mg 24 hr capsule Take 1 capsule (0.4 mg) by mouth once daily. 90 capsule 1    Vraylar 1.5 mg capsule Take 1 capsule (1.5 mg) by mouth once daily.      [DISCONTINUED] chlorhexidine (Peridex) 0.12 % solution Swish for 30 seconds and spit  15mL of solution the night before and morning of surgery 475 mL 0    [DISCONTINUED] lidocaine-prilocaine (Emla) 2.5-2.5 % cream To be applied BID prn (Patient not taking: Reported on 6/12/2024) 30 g 2    [DISCONTINUED] sildenafil (Viagra) 100 mg tablet TAKE ONE TABLET BY MOUTH DAILY AS NEEDED FOR COITUS 10 tablet 5    [DISCONTINUED] sildenafil (Viagra) 100 mg tablet TAKE ONE TABLET BY MOUTH DAILY AS NEEDED FOR COITUS (VIAL) 10 tablet 11     No current facility-administered medications on file prior to visit.       Objective   Physicial Exam  General: Well developed, well nourished, alert and cooperative, appears in no acute distress  Eyes: Non-injected conjunctiva, sclera clear, no proptosis  Cardiac: Extremities are warm and well perfused. No edema, cyanosis or pallor.   Lungs: Breathing is easy, non-labored. Speaking in clear and complete sentences. Normal diaphragmatic movement.  MSK: Ambulatory with steady gait, unassisted  Neuro: alert and oriented to person, place and time  Psych: Demonstrates good judgement and reason, without hallucinations, abnormal affect or abnormal behaviors.  Skin: no obvious lesions, no rashes.    Review of Systems  All other systems have been reviewed and are negative for complaint.      Assessment and Plan   Drain removed and patient tolerated well. He was educated on postoperative incision care. He was educated on activity restrictions. He will follow-up with Dr. Dawkins at previously scheduled appointment. He will call the office with any new or concerning symptoms.    All questions and concerns were addressed. Patient verbalizes understanding and has no other questions at this time. If you have any questions about your care, do not hesitate to call and leave a message, we return calls in a timely manner.    Elizabet Ogden-- ISREAL MALDONADO  Office Phone:  [phone]

## 2024-07-01 ENCOUNTER — OFFICE VISIT (OUTPATIENT)
Dept: PAIN MEDICINE | Facility: HOSPITAL | Age: 49
End: 2024-07-01
Payer: COMMERCIAL

## 2024-07-01 VITALS
HEART RATE: 94 BPM | BODY MASS INDEX: 24.98 KG/M2 | RESPIRATION RATE: 16 BRPM | SYSTOLIC BLOOD PRESSURE: 109 MMHG | HEIGHT: 70 IN | DIASTOLIC BLOOD PRESSURE: 73 MMHG | OXYGEN SATURATION: 96 % | WEIGHT: 174.5 LBS

## 2024-07-01 DIAGNOSIS — M47.816 LUMBAR SPONDYLOSIS: ICD-10-CM

## 2024-07-01 DIAGNOSIS — M47.812 CERVICAL SPONDYLOSIS: Primary | ICD-10-CM

## 2024-07-01 PROCEDURE — 4004F PT TOBACCO SCREEN RCVD TLK: CPT | Performed by: CLINICAL NURSE SPECIALIST

## 2024-07-01 PROCEDURE — 99214 OFFICE O/P EST MOD 30 MIN: CPT | Performed by: CLINICAL NURSE SPECIALIST

## 2024-07-01 PROCEDURE — 3074F SYST BP LT 130 MM HG: CPT | Performed by: CLINICAL NURSE SPECIALIST

## 2024-07-01 PROCEDURE — 3078F DIAST BP <80 MM HG: CPT | Performed by: CLINICAL NURSE SPECIALIST

## 2024-07-01 ASSESSMENT — COLUMBIA-SUICIDE SEVERITY RATING SCALE - C-SSRS
2. HAVE YOU ACTUALLY HAD ANY THOUGHTS OF KILLING YOURSELF?: NO
6. HAVE YOU EVER DONE ANYTHING, STARTED TO DO ANYTHING, OR PREPARED TO DO ANYTHING TO END YOUR LIFE?: NO
1. IN THE PAST MONTH, HAVE YOU WISHED YOU WERE DEAD OR WISHED YOU COULD GO TO SLEEP AND NOT WAKE UP?: NO

## 2024-07-01 ASSESSMENT — PATIENT HEALTH QUESTIONNAIRE - PHQ9
SUM OF ALL RESPONSES TO PHQ9 QUESTIONS 1 AND 2: 0
2. FEELING DOWN, DEPRESSED OR HOPELESS: NOT AT ALL
1. LITTLE INTEREST OR PLEASURE IN DOING THINGS: NOT AT ALL

## 2024-07-01 ASSESSMENT — ENCOUNTER SYMPTOMS
DEPRESSION: 0
LOSS OF SENSATION IN FEET: 0
OCCASIONAL FEELINGS OF UNSTEADINESS: 1
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
## 2024-07-01 NOTE — ASSESSMENT & PLAN NOTE
48 year old male presents for follow-up of neck and low back pain. Patients symptoms and exam are consistent with cervical spondylosis as well as a cervical neuritis.  Current symptoms also consistent with a lumbar spondylosis/lumbar neuritis.  The patient also has a component of myofascial throughout cervical, thoracic and lumbar region.  Multiple areas of muscle tightness noted on exam.  MRI cervical spine without contrast consistent with multiple levels of degeneration with foraminal stenosis worse at C6-C7 but also at C5-6.  Patient states Dr. Chandler would like him to do cervical MRI with contrast as well as a lumbar MRI for additional evaluation of symptoms.  Currently he is awaiting scheduling for both.  He has not exhausted conservative treatment in the form of physical therapy.  He has attended 1 session of physical therapy and is willing to proceed with additional physical therapy targeting both cervical and lumbar pain as well as myofascial symptoms.  Patient was prescribed medication such as gabapentin 300 mg 3 times daily per Dr. Chanlder.  Admitting to only taking twice daily on most days.  Recommended that he attempt to take the gabapentin 3 times daily consistently for optimal results.  He no longer is taking Celebrex per PCP.  Recommended that he may supplement with Tylenol taking up to 3000 mg in 24 hours.  Advised patient to follow-up in our office for further discussion about potential injection therapy after he has completed 6 to 8 weeks of physical therapy and imaging.  Plan reviewed with patient at today's visit.    -Proceed with physical therapy targeting cervical, lumbar and myofascial symptoms.  Advised 6 to 8 weeks of physical therapy.  -Patient may supplement with Tylenol keeping total Tylenol dose less than 3000 mg in 24 hours.  -Advised to continue gabapentin as prescribed by Dr. Chandler; 300 mg 3 times daily.  Recommended that patient attempt to take this medication 3 times daily for  optimal results.  -Patient will follow-up in our office to discuss potential injections if appropriate after completing imaging and physical therapy.    Disclaimer: This note was transcribed using an audio transcription device.  As such, minor errors may be present with regard to spelling, punctuation, and inadvertent word insertion.  Please disregard such errors.

## 2024-07-02 DIAGNOSIS — K21.00 GASTROESOPHAGEAL REFLUX DISEASE WITH ESOPHAGITIS WITHOUT HEMORRHAGE: ICD-10-CM

## 2024-07-02 RX ORDER — PANTOPRAZOLE SODIUM 40 MG/1
TABLET, DELAYED RELEASE ORAL
Qty: 180 TABLET | Refills: 1 | OUTPATIENT
Start: 2024-07-02

## 2024-07-05 ENCOUNTER — DOCUMENTATION (OUTPATIENT)
Dept: PHYSICAL THERAPY | Facility: HOSPITAL | Age: 49
End: 2024-07-05
Payer: COMMERCIAL

## 2024-07-05 DIAGNOSIS — N52.9 ERECTILE DYSFUNCTION OF ORGANIC ORIGIN: ICD-10-CM

## 2024-07-05 DIAGNOSIS — G89.18 POST-OP PAIN: ICD-10-CM

## 2024-07-05 RX ORDER — HYDROCODONE BITARTRATE AND ACETAMINOPHEN 5; 325 MG/1; MG/1
1 TABLET ORAL EVERY 6 HOURS PRN
Qty: 12 TABLET | Refills: 0 | Status: SHIPPED | OUTPATIENT
Start: 2024-07-05 | End: 2024-07-05 | Stop reason: SDUPTHER

## 2024-07-05 RX ORDER — HYDROCODONE BITARTRATE AND ACETAMINOPHEN 5; 325 MG/1; MG/1
1 TABLET ORAL EVERY 6 HOURS PRN
Qty: 12 TABLET | Refills: 0 | Status: SHIPPED | OUTPATIENT
Start: 2024-07-05 | End: 2024-07-07 | Stop reason: SDUPTHER

## 2024-07-05 RX ORDER — MELOXICAM 15 MG/1
15 TABLET ORAL DAILY
Qty: 30 TABLET | Refills: 0 | Status: SHIPPED | OUTPATIENT
Start: 2024-07-05 | End: 2024-08-04

## 2024-07-05 NOTE — PROGRESS NOTES
Physical Therapy                 Therapy Communication Note    Patient Name: Shakila Shah  MRN: [id number]  Today's Date: 7/5/2024     Discipline: Physical Therapy    Missed Visit Reason:  pt not feeling well today post surgery    Missed Time: No Show    Comment:

## 2024-07-07 DIAGNOSIS — N52.9 ERECTILE DYSFUNCTION OF ORGANIC ORIGIN: ICD-10-CM

## 2024-07-07 RX ORDER — HYDROCODONE BITARTRATE AND ACETAMINOPHEN 5; 325 MG/1; MG/1
1 TABLET ORAL EVERY 6 HOURS PRN
Qty: 10 TABLET | Refills: 0 | Status: SHIPPED | OUTPATIENT
Start: 2024-07-07 | End: 2024-07-08

## 2024-07-07 NOTE — PROGRESS NOTES
Patient called with pain, not controlled by NSAIDS. Out of norco, requesting refill, sent to preferred pharmacy. No fevers, incision erythema or drainage. Next appt 7/11/24.     Ankit Concepcion MD  Urology Resident (PGY-5)

## 2024-07-08 RX ORDER — HYDROCODONE BITARTRATE AND ACETAMINOPHEN 5; 325 MG/1; MG/1
1 TABLET ORAL EVERY 6 HOURS PRN
Qty: 10 TABLET | Refills: 0 | Status: SHIPPED | OUTPATIENT
Start: 2024-07-08 | End: 2024-07-11

## 2024-07-11 ENCOUNTER — APPOINTMENT (OUTPATIENT)
Dept: UROLOGY | Facility: HOSPITAL | Age: 49
End: 2024-07-11
Payer: COMMERCIAL

## 2024-07-11 ENCOUNTER — OFFICE VISIT (OUTPATIENT)
Dept: UROLOGY | Facility: HOSPITAL | Age: 49
End: 2024-07-11
Payer: COMMERCIAL

## 2024-07-11 DIAGNOSIS — Z96.0 S/P INSERTION OF PENILE IMPLANT: Primary | ICD-10-CM

## 2024-07-11 PROCEDURE — 99211 OFF/OP EST MAY X REQ PHY/QHP: CPT | Performed by: UROLOGY

## 2024-07-11 RX ORDER — OXYCODONE AND ACETAMINOPHEN 10; 325 MG/1; MG/1
1 TABLET ORAL EVERY 6 HOURS PRN
Qty: 15 TABLET | Refills: 0 | Status: SHIPPED | OUTPATIENT
Start: 2024-07-11 | End: 2024-07-18

## 2024-07-11 NOTE — PROGRESS NOTES
FUV    Last seen - 6/26/24     HISTORY OF PRESENT ILLNESS:   Shakila Shah is a 48 y.o. male who is being seen today for POV    GGG2 PCa s/p RALP 11/2/23     PSA: 0.03 (4/30/24)     BETI: He is not wearing pads or Depends. No leakage.      ED: He is not able to have erections sufficient for intercourse. He is taking daily and OD dose of Cialis and Viagra with no improvement.  Tried AKIRA.  Doppler showed Veno-occlusive disease,  Started on trimix #5 at last visit. Not working well    Pt had IPP placed on 6/26/24 Coloplast Titan 26cm with no RTE, 125cc reservoir in L subrectus through counter-incision.  Discharged home from PACU.    Was in ER later that night with YVONNE drain not working, bulb was exchanged.  Seen by Elizabet Ogden on POD#2 for drain removal    7/11/24 - Having pain in scrotum, no f/c, no drainage    PAST MEDICAL HISTORY:  Past Medical History:   Diagnosis Date    Anxiety and depression     Diverticulosis of colon     Erectile dysfunction after radical prostatectomy     GERD (gastroesophageal reflux disease)     Hypertension     OA (osteoarthritis)     Overweight     Prostate CA (Multi)     s/p prostatectomy    SCC (squamous cell carcinoma)        PAST SURGICAL HISTORY:  Past Surgical History:   Procedure Laterality Date    ANKLE FRACTURE SURGERY Left 2021    PROSTATE BIOPSY  07/21/2022    MRI FUSION PROSTATE BIOPSY    PROSTATECTOMY  11/02/2022    Bilateral nerve-sparing radical prostatectomy with bilateral pelvic lymph node dissection        ALLERGIES:   No Known Allergies     MEDICATIONS:   Current Outpatient Medications   Medication Instructions    acetaminophen (Tylenol) 500 mg tablet TAKE TWO TABLETS BY MOUTH EVERY 8 HOURS AS NEEDED FOR PAIN (VIAL)    amLODIPine (NORVASC) 10 mg, oral, Daily, Take at 9 AM    buPROPion XL (WELLBUTRIN XL) 300 mg, oral, Daily, Take at 9 AM    celecoxib (CeleBREX) 100 mg capsule 1 capsule, oral, 2 times daily    dicyclomine (BENTYL) 10 mg, oral, 3 times daily     ergocalciferol (VITAMIN D2) 1,250 mcg, oral, Once Weekly    fluticasone (Flonase) 50 mcg/actuation nasal spray 1 spray, Each Nostril, Daily, Shake gently. Before first use, prime pump. After use, clean tip and replace cap.    gabapentin (NEURONTIN) 300 mg, oral, 3 times daily, Take it night for 5 days, then increase to am and pm for 5 days and then three times a day    HYDROcodone-acetaminophen (Norco) 5-325 mg tablet 1 tablet, oral, Every 6 hours PRN    losartan-hydrochlorothiazide (Hyzaar) 100-25 mg tablet 1 tablet, oral, Daily    meloxicam (MOBIC) 15 mg, oral, Daily    mirtazapine (REMERON) 7.5 mg, oral, Nightly    miscellaneous medical supply (Blood Pressure Cuff) misc To be used daily    naltrexone (DEPADE) 50 mg, oral, Daily    nortriptyline (PAMELOR) 25 mg, oral, Nightly, Inc to 2 caps after 1 month    pantoprazole (PROTONIX) 40 mg, oral, 2 times daily, Do not crush, chew, or split.    polyethylene glycol (Glycolax, Miralax) 17 gram/dose powder Take 17 g by mouth if needed.    polyethylene glycol (GLYCOLAX, MIRALAX) 17 g, oral, Daily    tamsulosin (FLOMAX) 0.4 mg, oral, Daily    Vraylar 1.5 mg, oral, Daily        PHYSICAL EXAM:  There were no vitals taken for this visit.  Constitutional: Patient appears well-developed and well-nourished. No distress.    Pulmonary/Chest: Effort normal. No respiratory distress.   Abdominal: Soft, ND NT  : Incision healing well, tips symmetrical and mid glans, pump midline and depenedent  Musculoskeletal: Normal range of motion.    Neurological: Alert and oriented to person, place, and time.  Psychiatric: Normal mood and affect. Behavior is normal. Thought content normal.      Labs  Lab Results   Component Value Date    TESTOSTERONE 295 03/16/2022     Lab Results   Component Value Date    PSA 0.03 04/30/2024     Lab Results   Component Value Date    GFRMALE 74 03/16/2023     Lab Results   Component Value Date    CREATININE 1.33 (H) 06/12/2024     Lab Results   Component Value  Date    CHOL 187 04/30/2024     Lab Results   Component Value Date    HDL 28.6 04/30/2024     Lab Results   Component Value Date    CHHDL 6.5 04/30/2024     Lab Results   Component Value Date    LDLF 90 03/16/2022     Lab Results   Component Value Date    VLDL 39 04/30/2024     Lab Results   Component Value Date    TRIG 196 (H) 04/30/2024     Lab Results   Component Value Date    HCT 44.3 06/12/2024       Assessment:      1. S/P insertion of penile implant          Doing well     Plan:   Local wound care  Cycle when comfortable  Warning signs discussed

## 2024-07-12 ENCOUNTER — TREATMENT (OUTPATIENT)
Dept: PHYSICAL THERAPY | Facility: HOSPITAL | Age: 49
End: 2024-07-12
Payer: COMMERCIAL

## 2024-07-12 DIAGNOSIS — M47.816 LUMBAR SPONDYLOSIS: ICD-10-CM

## 2024-07-12 DIAGNOSIS — C61 MALIGNANT NEOPLASM OF PROSTATE (MULTI): ICD-10-CM

## 2024-07-12 DIAGNOSIS — R26.89 IMPAIRMENT OF BALANCE: ICD-10-CM

## 2024-07-12 DIAGNOSIS — M47.812 CERVICAL SPONDYLOSIS: Primary | ICD-10-CM

## 2024-07-12 PROCEDURE — 97140 MANUAL THERAPY 1/> REGIONS: CPT | Mod: GP | Performed by: PHYSICAL THERAPIST

## 2024-07-12 PROCEDURE — 97110 THERAPEUTIC EXERCISES: CPT | Mod: GP | Performed by: PHYSICAL THERAPIST

## 2024-07-12 ASSESSMENT — PAIN SCALES - GENERAL
PAINLEVEL_OUTOF10: 6
PAINLEVEL_OUTOF10: 7
PAINLEVEL_OUTOF10: 8
PAINLEVEL_OUTOF10: 7

## 2024-07-12 ASSESSMENT — PAIN - FUNCTIONAL ASSESSMENT: PAIN_FUNCTIONAL_ASSESSMENT: 0-10

## 2024-07-12 ASSESSMENT — PAIN DESCRIPTION - DESCRIPTORS: DESCRIPTORS: RADIATING
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
## 2024-07-15 ENCOUNTER — APPOINTMENT (OUTPATIENT)
Dept: RADIOLOGY | Facility: HOSPITAL | Age: 49
End: 2024-07-15
Payer: COMMERCIAL

## 2024-07-18 ENCOUNTER — APPOINTMENT (OUTPATIENT)
Dept: PHYSICAL MEDICINE AND REHAB | Facility: CLINIC | Age: 49
End: 2024-07-18
Payer: COMMERCIAL

## 2024-07-19 ENCOUNTER — TREATMENT (OUTPATIENT)
Dept: PHYSICAL THERAPY | Facility: HOSPITAL | Age: 49
End: 2024-07-19
Payer: COMMERCIAL

## 2024-07-19 DIAGNOSIS — M47.816 LUMBAR SPONDYLOSIS: ICD-10-CM

## 2024-07-19 DIAGNOSIS — R26.89 IMPAIRMENT OF BALANCE: ICD-10-CM

## 2024-07-19 DIAGNOSIS — M47.812 CERVICAL SPONDYLOSIS: ICD-10-CM

## 2024-07-19 PROCEDURE — 97110 THERAPEUTIC EXERCISES: CPT | Mod: GP,CQ

## 2024-07-19 PROCEDURE — 97140 MANUAL THERAPY 1/> REGIONS: CPT | Mod: GP,CQ

## 2024-07-19 ASSESSMENT — PAIN SCALES - GENERAL
PAINLEVEL_OUTOF10: 7
PAINLEVEL_OUTOF10: 6
PAINLEVEL_OUTOF10: 6

## 2024-07-19 ASSESSMENT — PAIN DESCRIPTION - DESCRIPTORS
DESCRIPTORS: HEAVINESS
DESCRIPTORS: NUMBNESS;TINGLING

## 2024-07-19 ASSESSMENT — PAIN - FUNCTIONAL ASSESSMENT: PAIN_FUNCTIONAL_ASSESSMENT: 0-10

## 2024-07-19 NOTE — PROGRESS NOTES
"Physical Therapy    Physical Therapy    Physical Therapy Treatment      Patient Name: Shakila Shah  MRN: [id number]  Today's Date: 7/19/2024  Visit # 4  Time Calculation  Start Time: 0933  Stop Time: 1020  Time Calculation (min): 47 min      Subjective    Pt noted it is one of his good days pain wise but he feels increased neck pain. He noted he still has tenderness from surgery. He does not have his ankle brace on today.      Precautions  Precautions  STEADI Fall Risk Score (The score of 4 or more indicates an increased risk of falling): 5 (1 fall in June)    Current Problem:   1. Lumbar spondylosis  Follow Up In Physical Therapy      2. Cervical spondylosis  Follow Up In Physical Therapy      3. Impairment of balance  Follow Up In Physical Therapy          Pain:  Pain Assessment  Pain Assessment: 0-10  0-10 (Numeric) Pain Score:  (7.5)  Pain Type: Chronic pain  Pain Location: Neck  Pain Orientation: Right, Left  Pain Radiating Towards: intermittent on R into fingers  Pain Descriptors: Numbness, Tingling  Multiple Pain Sites: Four  Pain 2  Pain Score 2: 6  Pain Type 2: Chronic pain  Pain Location 2: Back  Pain Orientation 2: Lower, Right, Left  Pain Descriptors 2: Heaviness  Pain 3  Pain Score 3: 6  Pain Type 3: Chronic pain  Pain Location 3: Ankle  Pain Orientation 3: Left  Pain 4  Pain Score 4: 7  Pain Type 4: Surgical pain      Objective       added upper traps stretch, levator stretch.          Treatments: neck, back pain , balance impairment.    EXERCISES           Date 6/24/24 7-12-24 7/19/24     VISIT# #2 #3 #4 #     REPS REPS REPS REPS               nustep L2 10'  hold off due to recent prostate surgery  pain sitting  NT     Pulleys flex  3 '     1'     Wand supine shoulder flex  10 x 1 15x1    Cervical retraction    3 sec x10  10 x 3 sec  supine  10x3\"     Cervical stretching    3 sec x10  10 sec x 3  5'      shoulder AAROM hooklying  Flex and scaption / abd    10 x 2 ea  Each arm   10x2 ea              " "   pelvic tilt  SKC  bridge  DKTC       3 sec x10   x20  hold off due to recent surgery  NT      LTR      10x5\"H     Scapular retraction  Rows  Lat pull down    Green 2 x 10  Green 2 x 10    10x5\"      upper traps stretch       3x15\" ea     Levator stretch   3x15\" ea           Balance work in // bars   Marching  Side step  NBOS EO/ EC       2 laps   2 laps  30 sec x 1 ea        2 laps                 TUG           HEP                   Assessment: Patient needed to remove left shoe for comfort throughout the session, cues given to put back on before standing. Pt had increased stomach pain with pulleys today. Pt able to complete levator and upper traps stretches without increasing pain in neck/shoulders. Pt attempted lateral walking in // bars but felt instability in ankle due to missing brace. Attempted gentle LTR, no pain in abdomen with exercise but although initially felt good stretch in back, uncomfortable at end of 10 reps. Pt reported neck pain reduced to 6/10 at end of session but back pain increased to 7/10    Plan: Progress as tolerated.    Goals:  Active       PT Problem       PT Goal        Start:  06/18/24    Expected End:  07/09/24       Patient to be independent with home exercises for cervical and scapular area to increase ROM and stabilization for increased function  Patient to demonstrate awareness of neutral alignment for posture and understand strategies to use for sleeping, standing , sitting and functional activities to reduce symptoms and / or radiculopathy           PT Goal        Start:  06/18/24    Expected End:  07/30/24       Pain to reduce by 2 pain grades neck and back  Patient to demonstrate increased strength by 1/3 MMT grade in areas of weakness for increased function and reduced pain  Patient to have oswestry  of 4 points or more improvement for increased functional mobility    Pt to demo balance confidence to increase ABC score and reduce fall risk             "

## 2024-07-26 ENCOUNTER — TREATMENT (OUTPATIENT)
Dept: PHYSICAL THERAPY | Facility: HOSPITAL | Age: 49
End: 2024-07-26
Payer: COMMERCIAL

## 2024-07-26 DIAGNOSIS — M47.812 CERVICAL SPONDYLOSIS: Primary | ICD-10-CM

## 2024-07-26 DIAGNOSIS — C61 MALIGNANT NEOPLASM OF PROSTATE (MULTI): ICD-10-CM

## 2024-07-26 DIAGNOSIS — M47.816 LUMBAR SPONDYLOSIS: ICD-10-CM

## 2024-07-26 DIAGNOSIS — R26.89 IMPAIRMENT OF BALANCE: ICD-10-CM

## 2024-07-26 PROCEDURE — 97110 THERAPEUTIC EXERCISES: CPT | Mod: GP,CQ

## 2024-07-26 ASSESSMENT — PAIN SCALES - GENERAL
PAINLEVEL_OUTOF10: 0 - NO PAIN
PAINLEVEL_OUTOF10: 7
PAINLEVEL_OUTOF10: 8
PAINLEVEL_OUTOF10: 0 - NO PAIN

## 2024-07-26 ASSESSMENT — PAIN - FUNCTIONAL ASSESSMENT: PAIN_FUNCTIONAL_ASSESSMENT: 0-10

## 2024-07-26 NOTE — PROGRESS NOTES
Physical Therapy    Physical Therapy Treatment      Patient Name: Shakila Shah  MRN: [id number]  Today's Date: 7/26/2024  Visit # 5  Time Calculation  Start Time: 0930  Stop Time: 1015  Time Calculation (min): 45 min   TE 45 min    Subjective      After last visit he had some fatigue and soreness but 4 hours of his day after his treatment he felt pretty good. He noted he always has irritation but the pain was lessened. Standing and household chores are very difficult on his back.   Pt noted he has ankle brace on today. He hasn't been on his ankle much today and doesn't feel discomfort. He had radiating sx down arm early today to fingers but currently none. His incision pain has improved and he only has pain with pressure to the area. Standing and household chores are very difficult on his back.       Precautions  Precautions  STEADI Fall Risk Score (The score of 4 or more indicates an increased risk of falling): 5    Current Problem:   1. Cervical spondylosis  Follow Up In Physical Therapy      2. Lumbar spondylosis  Follow Up In Physical Therapy      3. Impairment of balance  Follow Up In Physical Therapy      4. Malignant neoplasm of prostate (Multi)            Pain:  Pain Assessment  Pain Assessment: 0-10  0-10 (Numeric) Pain Score: 7  Pain Location: Neck  Pain Orientation: Right, Left  Pain Radiating Towards: none  Multiple Pain Sites: Four  Pain 2  Pain Score 2: 8  Pain Type 2: Chronic pain  Pain Location 2: Back  Pain Orientation 2: Lower, Right, Left  Pain 3  Pain Score 3: 0 - No pain  Pain Type 3: Chronic pain  Pain Location 3: Ankle  Pain Orientation 3: Left  Pain 4  Pain Score 4: 0 - No pain (with applying pressure to site, can increase to 8/10)  Pain Type 4: Surgical pain      Objective    Added sktc    Treatments: neck, back pain , balance impairment.    EXERCISES           Date 6/24/24 7-12-24 7/19/24 7/26/24   VISIT# #2 #3 #4 #5     REPS REPS REPS REPS               nustep L2 10'  hold off due to  "recent prostate surgery  pain sitting  NT  L1 8'   Pulleys flex  3 '     1'  3'   Wand supine shoulder flex  10 x 1 15x1 15x1   Cervical retraction    3 sec x10  10 x 3 sec  supine  10x3\"     Cervical stretching    3 sec x10  10 sec x 3  5'      shoulder AAROM hooklying  Flex and scaption / abd    10 x 2 ea  Each arm   10x2 ea                 pelvic tilt  [facility]  bridge  DKTC       3 sec x10   x20  hold off due to recent surgery  NT    5x 10\" ea      LTR      10x5\"H  10x5\" ea   Scapular retraction  Rows  Lat pull down    Green 2 x 10  Green 2 x 10    10x5\"    Green 2x10  Green 2x10    upper traps stretch       3x15\" ea     Levator stretch   3x15\" ea           Balance work in // bars   Marching  Side step  NBOS EO/ EC       2 laps   2 laps  30 sec x 1 ea        2 laps    2 laps  2 laps  30\"x1 ea               TUG           HEP                   Assessment: Patient was able to add in NuStep, pulleys, LTR without irritating incision. Pt noted it felt good to move. He had some increased pressure on abdomen with SKTC but no pain.   Pt noted inserts were bothering him today in shoes. He had some LOB in // bars with marching but able to correct when holding onto bars. Pain reduced at end of session, back 5/10, incision and ankle pain still 0/10. He felt a lot better and ankle less stiff.     Plan: Progress as tolerated.    Goals:  Active       PT Problem       PT Goal        Start:  06/18/24    Expected End:  07/09/24       Patient to be independent with home exercises for cervical and scapular area to increase ROM and stabilization for increased function  Patient to demonstrate awareness of neutral alignment for posture and understand strategies to use for sleeping, standing , sitting and functional activities to reduce symptoms and / or radiculopathy           PT Goal        Start:  06/18/24    Expected End:  07/30/24       Pain to reduce by 2 pain grades neck and back  Patient to demonstrate increased strength by 1/3 MMT " grade in areas of weakness for increased function and reduced pain  Patient to have oswestry  of 4 points or more improvement for increased functional mobility    Pt to demo balance confidence to increase ABC score and reduce fall risk

## 2024-07-31 ENCOUNTER — APPOINTMENT (OUTPATIENT)
Dept: RADIOLOGY | Facility: HOSPITAL | Age: 49
End: 2024-07-31
Payer: COMMERCIAL

## 2024-08-02 ENCOUNTER — TREATMENT (OUTPATIENT)
Dept: PHYSICAL THERAPY | Facility: HOSPITAL | Age: 49
End: 2024-08-02
Payer: COMMERCIAL

## 2024-08-02 DIAGNOSIS — R26.89 IMPAIRMENT OF BALANCE: ICD-10-CM

## 2024-08-02 DIAGNOSIS — M47.812 CERVICAL SPONDYLOSIS: Primary | ICD-10-CM

## 2024-08-02 DIAGNOSIS — M47.816 LUMBAR SPONDYLOSIS: ICD-10-CM

## 2024-08-02 DIAGNOSIS — C61 MALIGNANT NEOPLASM OF PROSTATE (MULTI): ICD-10-CM

## 2024-08-02 PROCEDURE — 97140 MANUAL THERAPY 1/> REGIONS: CPT | Mod: GP | Performed by: PHYSICAL THERAPIST

## 2024-08-02 PROCEDURE — 97110 THERAPEUTIC EXERCISES: CPT | Mod: GP | Performed by: PHYSICAL THERAPIST

## 2024-08-02 ASSESSMENT — PAIN SCALES - GENERAL
PAINLEVEL_OUTOF10: 6
PAINLEVEL_OUTOF10: 8
PAINLEVEL_OUTOF10: 8
PAINLEVEL_OUTOF10: 0 - NO PAIN
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
## 2024-08-05 ENCOUNTER — TELEPHONE (OUTPATIENT)
Dept: UROLOGY | Facility: CLINIC | Age: 49
End: 2024-08-05
Payer: COMMERCIAL

## 2024-08-05 NOTE — TELEPHONE ENCOUNTER
[facility] pharmacy requesting refill for lidocaine cream. Patient seeing  08/08/2024. Last seen  in 09/2022 Thank you.

## 2024-08-07 ENCOUNTER — TELEPHONE (OUTPATIENT)
Dept: PRIMARY CARE | Facility: CLINIC | Age: 49
End: 2024-08-07
Payer: COMMERCIAL

## 2024-08-07 NOTE — TELEPHONE ENCOUNTER
Pt called rx line at [facility] requesting refill on meloxicam    Called pt and informed him to contact prescribing provider, he verbalized understanding

## 2024-08-08 ENCOUNTER — OFFICE VISIT (OUTPATIENT)
Dept: UROLOGY | Facility: HOSPITAL | Age: 49
End: 2024-08-08
Payer: COMMERCIAL

## 2024-08-08 DIAGNOSIS — Z96.0 S/P INSERTION OF PENILE IMPLANT: Primary | ICD-10-CM

## 2024-08-08 PROCEDURE — 99211 OFF/OP EST MAY X REQ PHY/QHP: CPT | Performed by: UROLOGY

## 2024-08-08 NOTE — PROGRESS NOTES
FUV    Last seen - 7/11/24     HISTORY OF PRESENT ILLNESS:   Shakila Shah is a 48 y.o. male who is being seen today for POV    GGG2 PCa s/p RALP 11/2/23     PSA: 0.03 (4/30/24)     BETI: He is not wearing pads or Depends. No leakage.      ED: He is not able to have erections sufficient for intercourse. He is taking daily and OD dose of Cialis and Viagra with no improvement.  Tried AKIRA.  Doppler showed Veno-occlusive disease,  Started on trimix #5 at last visit. Not working well    Pt had IPP placed on 6/26/24 Coloplast Titan 26cm with no RTE, 125cc reservoir in L subrectus through counter-incision.  Discharged home from PACU.    Was in ER later that night with YVONNE drain not working, bulb was exchanged.  Seen by Elizabet Ogden on POD#2 for drain removal    7/11/24 - Having pain in scrotum, no f/c, no drainage    8/8/24 - Doing well, able to cycle, pain resolved    PAST MEDICAL HISTORY:  Past Medical History:   Diagnosis Date    Anxiety and depression     Diverticulosis of colon     Erectile dysfunction after radical prostatectomy     GERD (gastroesophageal reflux disease)     Hypertension     OA (osteoarthritis)     Overweight     Prostate CA (Multi)     s/p prostatectomy    SCC (squamous cell carcinoma)        PAST SURGICAL HISTORY:  Past Surgical History:   Procedure Laterality Date    ANKLE FRACTURE SURGERY Left 2021    PROSTATE BIOPSY  07/21/2022    MRI FUSION PROSTATE BIOPSY    PROSTATECTOMY  11/02/2022    Bilateral nerve-sparing radical prostatectomy with bilateral pelvic lymph node dissection        ALLERGIES:   No Known Allergies     MEDICATIONS:   Current Outpatient Medications   Medication Instructions    acetaminophen (Tylenol) 500 mg tablet TAKE TWO TABLETS BY MOUTH EVERY 8 HOURS AS NEEDED FOR PAIN (VIAL)    amLODIPine (NORVASC) 10 mg, oral, Daily, Take at 9 AM    buPROPion XL (WELLBUTRIN XL) 300 mg, oral, Daily, Take at 9 AM    celecoxib (CeleBREX) 100 mg capsule 1 capsule, oral, 2 times daily     dicyclomine (BENTYL) 10 mg, oral, 3 times daily    fluticasone (Flonase) 50 mcg/actuation nasal spray 1 spray, Each Nostril, Daily, Shake gently. Before first use, prime pump. After use, clean tip and replace cap.    gabapentin (NEURONTIN) 300 mg, oral, 3 times daily, Take it night for 5 days, then increase to am and pm for 5 days and then three times a day    losartan-hydrochlorothiazide (Hyzaar) 100-25 mg tablet 1 tablet, oral, Daily    mirtazapine (REMERON) 7.5 mg, oral, Nightly    miscellaneous medical supply (Blood Pressure Cuff) misc To be used daily    naltrexone (DEPADE) 50 mg, oral, Daily    nortriptyline (PAMELOR) 25 mg, oral, Nightly, Inc to 2 caps after 1 month    pantoprazole (PROTONIX) 40 mg, oral, 2 times daily, Do not crush, chew, or split.    polyethylene glycol (Glycolax, Miralax) 17 gram/dose powder Take 17 g by mouth if needed.    tamsulosin (FLOMAX) 0.4 mg, oral, Daily    Vraylar 1.5 mg, oral, Daily        PHYSICAL EXAM:  There were no vitals taken for this visit.  Constitutional: Patient appears well-developed and well-nourished. No distress.    Pulmonary/Chest: Effort normal. No respiratory distress.   Abdominal: Soft, ND NT  : Incision healing well, tips symmetrical and mid glans, pump midline and depenedent  Musculoskeletal: Normal range of motion.    Neurological: Alert and oriented to person, place, and time.  Psychiatric: Normal mood and affect. Behavior is normal. Thought content normal.      Labs  Lab Results   Component Value Date    TESTOSTERONE 295 03/16/2022     Lab Results   Component Value Date    PSA 0.03 04/30/2024     Lab Results   Component Value Date    GFRMALE 74 03/16/2023     Lab Results   Component Value Date    CREATININE 1.33 (H) 06/12/2024     Lab Results   Component Value Date    CHOL 187 04/30/2024     Lab Results   Component Value Date    HDL 28.6 04/30/2024     Lab Results   Component Value Date    CHHDL 6.5 04/30/2024     Lab Results   Component Value Date     LDLF 90 03/16/2022     Lab Results   Component Value Date    VLDL 39 04/30/2024     Lab Results   Component Value Date    TRIG 196 (H) 04/30/2024     Lab Results   Component Value Date    HCT 44.3 06/12/2024       Assessment:      1. S/P insertion of penile implant          Doing well     Plan:   No restrictions  OK for intercourse    FU in 3m, sooner if needed

## 2024-08-09 ENCOUNTER — TREATMENT (OUTPATIENT)
Dept: PHYSICAL THERAPY | Facility: HOSPITAL | Age: 49
End: 2024-08-09
Payer: COMMERCIAL

## 2024-08-09 DIAGNOSIS — M47.812 CERVICAL SPONDYLOSIS: ICD-10-CM

## 2024-08-09 DIAGNOSIS — M47.816 LUMBAR SPONDYLOSIS: ICD-10-CM

## 2024-08-09 DIAGNOSIS — R26.89 IMPAIRMENT OF BALANCE: ICD-10-CM

## 2024-08-09 PROCEDURE — 97110 THERAPEUTIC EXERCISES: CPT | Mod: GP,CQ | Performed by: PHYSICAL THERAPY ASSISTANT

## 2024-08-09 ASSESSMENT — PAIN SCALES - GENERAL
PAINLEVEL_OUTOF10: 7
PAINLEVEL_OUTOF10: 6
PAINLEVEL_OUTOF10: 9
PAINLEVEL_OUTOF10: 0 - NO PAIN

## 2024-08-09 ASSESSMENT — PAIN DESCRIPTION - DESCRIPTORS: DESCRIPTORS: HEAVINESS

## 2024-08-09 ASSESSMENT — PAIN - FUNCTIONAL ASSESSMENT: PAIN_FUNCTIONAL_ASSESSMENT: 0-10

## 2024-08-09 NOTE — PROGRESS NOTES
"Physical Therapy    Physical Therapy Treatment    Patient Name: Shakila Shah  MRN: [id number]  : 1975  Today's Date: 2024  Time Calculation  Start Time: 933  Stop Time:   Time Calculation (min): 43 min    PT Therapeutic Procedures Time Entry  Therapeutic Exercise Time Entry: 43          VISIT:# 7    Current Problem  Problem List Items Addressed This Visit             ICD-10-CM    Cervical spondylosis M47.812    Lumbar spondylosis M47.816    Impairment of balance R26.89        Subjective      Pt reports increased Right upper trap pain since last session at 9/10 with rotation. Reports compliance with HEP. Reports left ankle pain since he washed brace and isn't currently wearing it.  Precautions  Precautions  STEADI Fall Risk Score (The score of 4 or more indicates an increased risk of falling): 5       Pain  Pain Assessment: 0-10  0-10 (Numeric) Pain Score: 9  Pain Location: Neck  Pain Orientation: Right  Multiple Pain Sites: Four  Pain Score 2: 7  Pain Type 2: Chronic pain  Pain Location 2: Back  Pain Orientation 2: Lower  Pain Descriptors 2: Heaviness    Objective              Treatments: neck, back pain , balance impairment.   Hxof chronic ankle issue and wears brace. Hx of recent penile implant surgery  EXERCISES             Date 24   VISIT# #2 #3 #4 #5 #6 #7     REPS REPS REPS REPS                   nustep L2 10'  hold off due to recent prostate surgery  pain sitting  NT  L1 8' L2  4 min  L1 6 min L1 x 10'   Pulleys flex  3 '     1'  3' 3' 3'   Wand supine shoulder flex   10 x 1 15x1 15x1 15 x 1    Cervical retraction    3 sec x10  10 x 3 sec  supine  10x3\"   10 x 3\"  Pain!   Cervical stretching    3 sec x10  10 sec x 3  5'        shoulder AAROM hooklying  Flex and scaption / abd    10 x 2 ea  Each arm   10x2 ea                     pelvic tilt  SKC  bridge  DKTC       3 sec x10   x20  hold off due to recent surgery  NT    5x 10\" ea    Next review ex " "for low back and provide written HEP Reviewed and provided handout    LTR      10x5\"H  10x5\" ea     Scapular retraction  Rows  Lat pull down    Green 2 x 10  Green 2 x 10    10x5\"    Green 2x10  Green 2x10 Sit   Green 2 x 10   Green 2 x10 Standing  Green 2 x 10   Green 2 x10    upper traps stretch       3x15\" ea   3 x 15' 3 x 15'   Levator stretch     3x15\" ea   3 x 15' 3 x 15'                 Balance work in // bars   Marching  Side step  NBOS EO/ EC       2 laps   2 laps  30 sec x 1 ea         2 laps    2 laps  2 laps  30\"x1 ea      manual to upper trap  STM         8'    Heat to uT Rt     10' 10\" while performing low back ex   TUG             HEP         [email or web address]     Access Code: [email or web address]  URL: [email or web address]  Date: 08/02/2024  Prepared by: Breanna Jimenez    Exercises  - Seated Chin Tuck with Neck Elongation  - 1 x daily - 7 x weekly - 10 reps - 5 hold  - Seated Passive Cervical Retraction  - 1 x daily - 7 x weekly - 10 reps - 5 hold  - Seated Cervical Sidebending AROM  - 1 x daily - 7 x weekly - 3 reps - 10 hold  - Seated Cervical Rotation AROM  - 1 x daily - 7 x weekly - 3 sets - 3 reps - 10 hold  - Seated Scapular Retraction  - 1 x daily - 7 x weekly - 10 reps - 5 hold  - Seated Shoulder Row with Anchored Resistance  - 1 x daily - 7 x weekly - 10 reps  - Seated Shoulder Extension and Scapular Retraction with Resistance  - 1 x daily - 7 x weekly - 10 reps  - Supine Shoulder Flexion with Dowel  - 1 x daily - 7 x weekly         Assessment:   Pt with slow, purposeful movements. Performed and issued updated HEP. Pt educated on importance of staying within pain free ROM. Will discontinue cerv retraction if pain persists with motion.      Plan:  progress ex and add core ex for LB     Goals:  Active       PT Problem       PT Goal        Start:  06/18/24    Expected End:  07/09/24       Patient to be independent with home exercises for cervical and scapular area to increase ROM and " stabilization for increased function  Patient to demonstrate awareness of neutral alignment for posture and understand strategies to use for sleeping, standing , sitting and functional activities to reduce symptoms and / or radiculopathy           PT Goal        Start:  06/18/24    Expected End:  07/30/24       Pain to reduce by 2 pain grades neck and back  Patient to demonstrate increased strength by 1/3 MMT grade in areas of weakness for increased function and reduced pain  Patient to have oswestry  of 4 points or more improvement for increased functional mobility    Pt to demo balance confidence to increase ABC score and reduce fall risk

## 2024-08-14 ENCOUNTER — APPOINTMENT (OUTPATIENT)
Dept: PAIN MEDICINE | Facility: HOSPITAL | Age: 49
End: 2024-08-14
Payer: COMMERCIAL

## 2024-08-15 ENCOUNTER — APPOINTMENT (OUTPATIENT)
Dept: RADIOLOGY | Facility: HOSPITAL | Age: 49
End: 2024-08-15
Payer: COMMERCIAL

## 2024-08-16 ENCOUNTER — APPOINTMENT (OUTPATIENT)
Dept: PHYSICAL THERAPY | Facility: HOSPITAL | Age: 49
End: 2024-08-16
Payer: COMMERCIAL

## 2024-08-22 ENCOUNTER — APPOINTMENT (OUTPATIENT)
Facility: HOSPITAL | Age: 49
End: 2024-08-22
Payer: COMMERCIAL

## 2024-08-23 ENCOUNTER — TREATMENT (OUTPATIENT)
Dept: PHYSICAL THERAPY | Facility: HOSPITAL | Age: 49
End: 2024-08-23
Payer: COMMERCIAL

## 2024-08-23 DIAGNOSIS — M47.816 LUMBAR SPONDYLOSIS: ICD-10-CM

## 2024-08-23 DIAGNOSIS — C61 MALIGNANT NEOPLASM OF PROSTATE (MULTI): ICD-10-CM

## 2024-08-23 DIAGNOSIS — M47.812 CERVICAL SPONDYLOSIS: Primary | ICD-10-CM

## 2024-08-23 DIAGNOSIS — R26.89 IMPAIRMENT OF BALANCE: ICD-10-CM

## 2024-08-23 PROCEDURE — 97110 THERAPEUTIC EXERCISES: CPT | Mod: GP | Performed by: PHYSICAL THERAPIST

## 2024-08-23 ASSESSMENT — PAIN SCALES - GENERAL
PAINLEVEL_OUTOF10: 0 - NO PAIN
PAINLEVEL_OUTOF10: 5 - MODERATE PAIN
PAINLEVEL_OUTOF10: 8
PAINLEVEL_OUTOF10: 8

## 2024-08-23 ASSESSMENT — PAIN DESCRIPTION - DESCRIPTORS: DESCRIPTORS: NUMBNESS;TINGLING

## 2024-08-23 ASSESSMENT — PAIN - FUNCTIONAL ASSESSMENT: PAIN_FUNCTIONAL_ASSESSMENT: 0-10
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
## 2024-08-27 ENCOUNTER — APPOINTMENT (OUTPATIENT)
Dept: PRIMARY CARE | Facility: CLINIC | Age: 49
End: 2024-08-27
Payer: COMMERCIAL

## 2024-08-27 VITALS
SYSTOLIC BLOOD PRESSURE: 106 MMHG | TEMPERATURE: 97.6 F | HEART RATE: 101 BPM | OXYGEN SATURATION: 98 % | BODY MASS INDEX: 24.68 KG/M2 | DIASTOLIC BLOOD PRESSURE: 64 MMHG | WEIGHT: 172 LBS

## 2024-08-27 DIAGNOSIS — M48.02 SPINAL STENOSIS OF CERVICAL REGION: ICD-10-CM

## 2024-08-27 DIAGNOSIS — F41.1 ANXIETY STATE: ICD-10-CM

## 2024-08-27 DIAGNOSIS — K21.00 GASTROESOPHAGEAL REFLUX DISEASE WITH ESOPHAGITIS WITHOUT HEMORRHAGE: ICD-10-CM

## 2024-08-27 DIAGNOSIS — I10 PRIMARY HYPERTENSION: ICD-10-CM

## 2024-08-27 DIAGNOSIS — M47.816 LUMBAR SPONDYLOSIS: ICD-10-CM

## 2024-08-27 DIAGNOSIS — M47.812 CERVICAL SPONDYLOSIS: Primary | ICD-10-CM

## 2024-08-27 PROCEDURE — 3074F SYST BP LT 130 MM HG: CPT | Performed by: FAMILY MEDICINE

## 2024-08-27 PROCEDURE — 3078F DIAST BP <80 MM HG: CPT | Performed by: FAMILY MEDICINE

## 2024-08-27 PROCEDURE — 99214 OFFICE O/P EST MOD 30 MIN: CPT | Performed by: FAMILY MEDICINE

## 2024-08-27 PROCEDURE — 4004F PT TOBACCO SCREEN RCVD TLK: CPT | Performed by: FAMILY MEDICINE

## 2024-08-27 RX ORDER — CELECOXIB 100 MG/1
100 CAPSULE ORAL 2 TIMES DAILY
Qty: 180 CAPSULE | Refills: 2 | Status: SHIPPED | OUTPATIENT
Start: 2024-08-27

## 2024-08-27 RX ORDER — PANTOPRAZOLE SODIUM 40 MG/1
40 TABLET, DELAYED RELEASE ORAL 2 TIMES DAILY
Qty: 180 TABLET | Refills: 1 | Status: SHIPPED | OUTPATIENT
Start: 2024-08-27 | End: 2025-02-23

## 2024-08-27 RX ORDER — AMLODIPINE BESYLATE 10 MG/1
10 TABLET ORAL DAILY
Qty: 90 TABLET | Refills: 1 | Status: SHIPPED | OUTPATIENT
Start: 2024-08-27

## 2024-08-27 ASSESSMENT — ENCOUNTER SYMPTOMS
INSOMNIA: 1
HYPERTENSION: 1

## 2024-08-27 NOTE — PROGRESS NOTES
Subjective   Patient ID: Shakila Shah is a 48 y.o. male who presents for Hypertension (recheck).  Hypertension    Insomnia    ED: Helped by Viagra    Prostate CA: Patient is following up with with urology.  Has been getting treatments which has been very difficult for him.    Anxiety: working with Psych who started him on Vraylar.    OA:  hurts in his neck, lumbar spine, elbows, feet. Knees.  Really his whole body hurts.  .  Unable to walk more than a block without a cane.  When lifting objects he gets shooting pain down his arms.  Hx of severe foraminal stenosis.    HTN: taking meds    Abdominal pain: Helped by the dicyclomine 3 times a day.  Needs refills.        Current Outpatient Medications on File Prior to Visit   Medication Sig Dispense Refill    acetaminophen (Tylenol) 500 mg tablet TAKE TWO TABLETS BY MOUTH EVERY 8 HOURS AS NEEDED FOR PAIN (VIAL) 180 tablet 11    buPROPion XL (Wellbutrin XL) 300 mg 24 hr tablet TAKE ONE TABLET BY MOUTH DAILY AT 9AM 90 tablet 1    dicyclomine (Bentyl) 10 mg capsule Take 1 capsule (10 mg) by mouth 3 times a day. 270 capsule 1    fluticasone (Flonase) 50 mcg/actuation nasal spray Administer 1 spray into each nostril once daily. Shake gently. Before first use, prime pump. After use, clean tip and replace cap. 16 g 5    gabapentin (Neurontin) 300 mg capsule Take 1 capsule (300 mg) by mouth 3 times a day. Take it night for 5 days, then increase to am and pm for 5 days and then three times a day 90 capsule 1    losartan-hydrochlorothiazide (Hyzaar) 100-25 mg tablet Take 1 tablet by mouth once daily. 90 tablet 3    mirtazapine (Remeron) 7.5 mg tablet Take 1 tablet (7.5 mg) by mouth once daily at bedtime.      miscellaneous medical supply (Blood Pressure Cuff) misc To be used daily 1 each 0    naltrexone (Depade) 50 mg tablet Take 1 tablet (50 mg) by mouth once daily.      nortriptyline (Pamelor) 25 mg capsule Take 1 capsule (25 mg) by mouth once daily at bedtime. Inc to 2 caps  after 1 month 60 capsule 5    polyethylene glycol (Glycolax, Miralax) 17 gram/dose powder Take 17 g by mouth if needed.      tamsulosin (Flomax) 0.4 mg 24 hr capsule Take 1 capsule (0.4 mg) by mouth once daily. 90 capsule 1    Vraylar 1.5 mg capsule Take 1 capsule (1.5 mg) by mouth once daily.      [DISCONTINUED] amLODIPine (Norvasc) 10 mg tablet TAKE ONE TABLET BY MOUTH DAILY AT 9AM 90 tablet 1    [DISCONTINUED] celecoxib (CeleBREX) 100 mg capsule Take 1 capsule (100 mg) by mouth 2 times a day.      [DISCONTINUED] pantoprazole (ProtoNix) 40 mg EC tablet Take 1 tablet (40 mg) by mouth 2 times a day. Do not crush, chew, or split. 180 tablet 1     No current facility-administered medications on file prior to visit.        Vitals:    08/27/24 1500   BP: 106/64   Pulse: 101   Temp: 36.4 °C (97.6 °F)   SpO2: 98%       Review of Systems   All other systems reviewed and are negative.      Objective     Physical Exam  Constitutional:       Appearance: Normal appearance. He is well-developed.   HENT:      Head: Atraumatic.   Cardiovascular:      Rate and Rhythm: Normal rate and regular rhythm.      Heart sounds: Normal heart sounds. No murmur heard.  Pulmonary:      Effort: Pulmonary effort is normal.      Breath sounds: Normal breath sounds.   Abdominal:      General: Bowel sounds are normal.      Palpations: Abdomen is soft.   Skin:     General: Skin is warm.   Neurological:      General: No focal deficit present.      Mental Status: He is alert.   Psychiatric:         Mood and Affect: Mood normal.         No visits with results within 2 Month(s) from this visit.   Latest known visit with results is:   Lab on 06/12/2024   Component Date Value Ref Range Status    Glucose 06/12/2024 85  74 - 99 mg/dL Final    Sodium 06/12/2024 137  136 - 145 mmol/L Final    Potassium 06/12/2024 4.2  3.5 - 5.3 mmol/L Final    Chloride 06/12/2024 104  98 - 107 mmol/L Final    Bicarbonate 06/12/2024 25  21 - 32 mmol/L Final    Anion Gap  06/12/2024 12  10 - 20 mmol/L Final    Urea Nitrogen 06/12/2024 13  6 - 23 mg/dL Final    Creatinine 06/12/2024 1.33 (H)  0.50 - 1.30 mg/dL Final    eGFR 06/12/2024 67  >60 mL/min/1.73m*2 Final    Calculations of estimated GFR are performed using the 2021 CKD-EPI Study Refit equation without the race variable for the IDMS-Traceable creatinine methods.  https://jasn.asnjournals.org/content/early/2021/09/22/ASN.4213424536    Calcium 06/12/2024 9.0  8.6 - 10.3 mg/dL Final    Protime 06/12/2024 10.2  9.8 - 12.8 seconds Final    INR 06/12/2024 0.9  0.9 - 1.1 Final    aPTT 06/12/2024 34  27 - 38 seconds Final    WBC 06/12/2024 9.8  4.4 - 11.3 x10*3/uL Final    nRBC 06/12/2024 0.0  0.0 - 0.0 /100 WBCs Final    RBC 06/12/2024 4.68  4.50 - 5.90 x10*6/uL Final    Hemoglobin 06/12/2024 14.3  13.5 - 17.5 g/dL Final    Hematocrit 06/12/2024 44.3  41.0 - 52.0 % Final    MCV 06/12/2024 95  80 - 100 fL Final    MCH 06/12/2024 30.6  26.0 - 34.0 pg Final    MCHC 06/12/2024 32.3  32.0 - 36.0 g/dL Final    RDW 06/12/2024 13.9  11.5 - 14.5 % Final    Platelets 06/12/2024 400  150 - 450 x10*3/uL Final    Color, Urine 06/12/2024 Light-Yellow  Light-Yellow, Yellow, Dark-Yellow Final    Appearance, Urine 06/12/2024 Clear  Clear Final    Specific Gravity, Urine 06/12/2024 1.009  1.005 - 1.035 Final    pH, Urine 06/12/2024 6.0  5.0, 5.5, 6.0, 6.5, 7.0, 7.5, 8.0 Final    Protein, Urine 06/12/2024 NEGATIVE  NEGATIVE, 10 (TRACE), 20 (TRACE) mg/dL Final    Glucose, Urine 06/12/2024 Normal  Normal mg/dL Final    Blood, Urine 06/12/2024 0.03 (TRACE) (A)  NEGATIVE Final    Ketones, Urine 06/12/2024 NEGATIVE  NEGATIVE mg/dL Final    Bilirubin, Urine 06/12/2024 NEGATIVE  NEGATIVE Final    Urobilinogen, Urine 06/12/2024 Normal  Normal mg/dL Final    Nitrite, Urine 06/12/2024 NEGATIVE  NEGATIVE Final    Leukocyte Esterase, Urine 06/12/2024 NEGATIVE  NEGATIVE Final    WBC, Urine 06/12/2024 1-5  1-5, NONE /HPF Final    RBC, Urine 06/12/2024 NONE  NONE,  1-2, 3-5 /HPF Final    Squamous Epithelial Cells, Urine 06/12/2024 1-9 (SPARSE)  Reference range not established. /HPF Final    Bacteria, Urine 06/12/2024 1+ (A)  NONE SEEN /HPF Final       Assessment/Plan   Problem List Items Addressed This Visit       Cervical spondylosis - Primary    Relevant Medications    celecoxib (CeleBREX) 100 mg capsule     Other Visit Diagnoses       Gastroesophageal reflux disease with esophagitis without hemorrhage        Relevant Medications    pantoprazole (ProtoNix) 40 mg EC tablet    Primary hypertension        Relevant Medications    amLODIPine (Norvasc) 10 mg tablet          Told pt that we would be willing to treat his pain but he is not permitted to be on MJ while getting treated with opiates.  Stop taking large quantities of tylenol.  Fu in 1 mo and will drug test pt.  Starting Celebrex again.

## 2024-09-26 ENCOUNTER — APPOINTMENT (OUTPATIENT)
Dept: PRIMARY CARE | Facility: CLINIC | Age: 49
End: 2024-09-26
Payer: COMMERCIAL

## 2024-09-26 ENCOUNTER — LAB (OUTPATIENT)
Dept: LAB | Facility: LAB | Age: 49
End: 2024-09-26
Payer: COMMERCIAL

## 2024-09-26 VITALS
OXYGEN SATURATION: 96 % | SYSTOLIC BLOOD PRESSURE: 122 MMHG | HEART RATE: 109 BPM | WEIGHT: 178 LBS | BODY MASS INDEX: 25.54 KG/M2 | DIASTOLIC BLOOD PRESSURE: 80 MMHG | TEMPERATURE: 98.4 F

## 2024-09-26 DIAGNOSIS — M15.9 GENERALIZED OSTEOARTHRITIS: ICD-10-CM

## 2024-09-26 DIAGNOSIS — Z23 FLU VACCINE NEED: ICD-10-CM

## 2024-09-26 DIAGNOSIS — M15.9 GENERALIZED OSTEOARTHRITIS: Primary | ICD-10-CM

## 2024-09-26 LAB
AMPHETAMINES UR QL SCN: ABNORMAL
BARBITURATES UR QL SCN: ABNORMAL
BENZODIAZ UR QL SCN: ABNORMAL
BZE UR QL SCN: ABNORMAL
CANNABINOIDS UR QL SCN: ABNORMAL
FENTANYL+NORFENTANYL UR QL SCN: ABNORMAL
METHADONE UR QL SCN: ABNORMAL
OPIATES UR QL SCN: ABNORMAL
OXYCODONE+OXYMORPHONE UR QL SCN: ABNORMAL
PCP UR QL SCN: ABNORMAL

## 2024-09-26 PROCEDURE — 80307 DRUG TEST PRSMV CHEM ANLYZR: CPT

## 2024-09-26 PROCEDURE — 90656 IIV3 VACC NO PRSV 0.5 ML IM: CPT | Performed by: FAMILY MEDICINE

## 2024-09-26 PROCEDURE — 80349 CANNABINOIDS NATURAL: CPT

## 2024-09-26 PROCEDURE — 99213 OFFICE O/P EST LOW 20 MIN: CPT | Performed by: FAMILY MEDICINE

## 2024-09-26 PROCEDURE — 3079F DIAST BP 80-89 MM HG: CPT | Performed by: FAMILY MEDICINE

## 2024-09-26 PROCEDURE — 90471 IMMUNIZATION ADMIN: CPT | Performed by: FAMILY MEDICINE

## 2024-09-26 PROCEDURE — 3074F SYST BP LT 130 MM HG: CPT | Performed by: FAMILY MEDICINE

## 2024-09-26 PROCEDURE — 4004F PT TOBACCO SCREEN RCVD TLK: CPT | Performed by: FAMILY MEDICINE

## 2024-09-26 RX ORDER — ERGOCALCIFEROL 1.25 MG/1
50000 CAPSULE ORAL WEEKLY
COMMUNITY
Start: 2024-09-17

## 2024-09-26 NOTE — PROGRESS NOTES
Subjective   Patient ID: Shakila Shah is a 48 y.o. male who presents for Osteoarthritis (Recheck).  HPI patient presents today to follow-up on his osteoarthritis.  He reports that he has not been smoking marijuana this month.  Although he thinks he may have accidentally hit a something couple weeks ago.  It has not been as hard for him to quit smoking.  Pain levels are about the same.    Patient Active Problem List   Diagnosis    Anxiety state    Erectile dysfunction of organic origin    Essential hypertension    Generalized osteoarthritis    Malignant neoplasm of prostate (Multi)    Moderate recurrent major depression (Multi)    Diverticulosis of colon    Elevated PSA    Gastritis    Hiatal hernia    Internal hemorrhoids    Irritation of right eye    Lower urinary obstructive symptom    Nausea, vomiting, and diarrhea    Trimalleolar fracture of ankle, closed    Uveitis    Eyes swollen    SCC (squamous cell carcinoma)    Premature ejaculation    History of robot-assisted laparoscopic radical prostatectomy    Erectile dysfunction after radical prostatectomy    Cervical spondylosis    Lumbar spondylosis    Impairment of balance    S/P insertion of penile implant       Social Connections: Not on file       Current Outpatient Medications on File Prior to Visit   Medication Sig Dispense Refill    acetaminophen (Tylenol) 500 mg tablet TAKE TWO TABLETS BY MOUTH EVERY 8 HOURS AS NEEDED FOR PAIN (VIAL) 180 tablet 11    amLODIPine (Norvasc) 10 mg tablet Take 1 tablet (10 mg) by mouth once daily. Take at 9 AM 90 tablet 1    buPROPion XL (Wellbutrin XL) 300 mg 24 hr tablet TAKE ONE TABLET BY MOUTH DAILY AT 9AM 90 tablet 1    celecoxib (CeleBREX) 100 mg capsule Take 1 capsule (100 mg) by mouth 2 times a day. 180 capsule 2    dicyclomine (Bentyl) 10 mg capsule Take 1 capsule (10 mg) by mouth 3 times a day. 270 capsule 1    ergocalciferol (Vitamin D-2) 1.25 MG (76767 UT) capsule Take 1 capsule (50,000 Units) by mouth 1 (one)  time per week.      fluticasone (Flonase) 50 mcg/actuation nasal spray Administer 1 spray into each nostril once daily. Shake gently. Before first use, prime pump. After use, clean tip and replace cap. 16 g 5    gabapentin (Neurontin) 300 mg capsule Take 1 capsule (300 mg) by mouth 3 times a day. Take it night for 5 days, then increase to am and pm for 5 days and then three times a day 90 capsule 1    losartan-hydrochlorothiazide (Hyzaar) 100-25 mg tablet Take 1 tablet by mouth once daily. 90 tablet 3    mirtazapine (Remeron) 7.5 mg tablet Take 1 tablet (7.5 mg) by mouth once daily at bedtime.      miscellaneous medical supply (Blood Pressure Cuff) misc To be used daily 1 each 0    naltrexone (Depade) 50 mg tablet Take 1 tablet (50 mg) by mouth once daily.      nortriptyline (Pamelor) 25 mg capsule Take 1 capsule (25 mg) by mouth once daily at bedtime. Inc to 2 caps after 1 month 60 capsule 5    pantoprazole (ProtoNix) 40 mg EC tablet Take 1 tablet (40 mg) by mouth 2 times a day. Do not crush, chew, or split. 180 tablet 1    polyethylene glycol (Glycolax, Miralax) 17 gram/dose powder Mix 17 g of powder and drink if needed.      tamsulosin (Flomax) 0.4 mg 24 hr capsule Take 1 capsule (0.4 mg) by mouth once daily. 90 capsule 1    Vraylar 1.5 mg capsule Take 1 capsule (1.5 mg) by mouth once daily.       No current facility-administered medications on file prior to visit.        Vitals:    09/26/24 1339   BP: 122/80   Pulse: 109   Temp: 36.9 °C (98.4 °F)   SpO2: 96%     Vitals:    09/26/24 1339   Weight: 80.7 kg (178 lb)       Review of Systems   All other systems reviewed and are negative.      Objective     Physical Exam  Constitutional:       Appearance: Normal appearance. He is well-developed.   HENT:      Head: Atraumatic.   Cardiovascular:      Rate and Rhythm: Normal rate and regular rhythm.      Heart sounds: Normal heart sounds. No murmur heard.  Pulmonary:      Effort: Pulmonary effort is normal.      Breath  sounds: Normal breath sounds.   Abdominal:      General: Bowel sounds are normal.      Palpations: Abdomen is soft.   Skin:     General: Skin is warm.   Neurological:      General: No focal deficit present.      Mental Status: He is alert.   Psychiatric:         Mood and Affect: Mood normal.         No visits with results within 2 Month(s) from this visit.   Latest known visit with results is:   Lab on 06/12/2024   Component Date Value Ref Range Status    Glucose 06/12/2024 85  74 - 99 mg/dL Final    Sodium 06/12/2024 137  136 - 145 mmol/L Final    Potassium 06/12/2024 4.2  3.5 - 5.3 mmol/L Final    Chloride 06/12/2024 104  98 - 107 mmol/L Final    Bicarbonate 06/12/2024 25  21 - 32 mmol/L Final    Anion Gap 06/12/2024 12  10 - 20 mmol/L Final    Urea Nitrogen 06/12/2024 13  6 - 23 mg/dL Final    Creatinine 06/12/2024 1.33 (H)  0.50 - 1.30 mg/dL Final    eGFR 06/12/2024 67  >60 mL/min/1.73m*2 Final    Calculations of estimated GFR are performed using the 2021 CKD-EPI Study Refit equation without the race variable for the IDMS-Traceable creatinine methods.  https://jasn.asnjournals.org/content/early/2021/09/22/ASN.6146202132    Calcium 06/12/2024 9.0  8.6 - 10.3 mg/dL Final    Protime 06/12/2024 10.2  9.8 - 12.8 seconds Final    INR 06/12/2024 0.9  0.9 - 1.1 Final    aPTT 06/12/2024 34  27 - 38 seconds Final    WBC 06/12/2024 9.8  4.4 - 11.3 x10*3/uL Final    nRBC 06/12/2024 0.0  0.0 - 0.0 /100 WBCs Final    RBC 06/12/2024 4.68  4.50 - 5.90 x10*6/uL Final    Hemoglobin 06/12/2024 14.3  13.5 - 17.5 g/dL Final    Hematocrit 06/12/2024 44.3  41.0 - 52.0 % Final    MCV 06/12/2024 95  80 - 100 fL Final    MCH 06/12/2024 30.6  26.0 - 34.0 pg Final    MCHC 06/12/2024 32.3  32.0 - 36.0 g/dL Final    RDW 06/12/2024 13.9  11.5 - 14.5 % Final    Platelets 06/12/2024 400  150 - 450 x10*3/uL Final    Color, Urine 06/12/2024 Light-Yellow  Light-Yellow, Yellow, Dark-Yellow Final    Appearance, Urine 06/12/2024 Clear  Clear Final     Specific Gravity, Urine 06/12/2024 1.009  1.005 - 1.035 Final    pH, Urine 06/12/2024 6.0  5.0, 5.5, 6.0, 6.5, 7.0, 7.5, 8.0 Final    Protein, Urine 06/12/2024 NEGATIVE  NEGATIVE, 10 (TRACE), 20 (TRACE) mg/dL Final    Glucose, Urine 06/12/2024 Normal  Normal mg/dL Final    Blood, Urine 06/12/2024 0.03 (TRACE) (A)  NEGATIVE Final    Ketones, Urine 06/12/2024 NEGATIVE  NEGATIVE mg/dL Final    Bilirubin, Urine 06/12/2024 NEGATIVE  NEGATIVE Final    Urobilinogen, Urine 06/12/2024 Normal  Normal mg/dL Final    Nitrite, Urine 06/12/2024 NEGATIVE  NEGATIVE Final    Leukocyte Esterase, Urine 06/12/2024 NEGATIVE  NEGATIVE Final    WBC, Urine 06/12/2024 1-5  1-5, NONE /HPF Final    RBC, Urine 06/12/2024 NONE  NONE, 1-2, 3-5 /HPF Final    Squamous Epithelial Cells, Urine 06/12/2024 1-9 (SPARSE)  Reference range not established. /HPF Final    Bacteria, Urine 06/12/2024 1+ (A)  NONE SEEN /HPF Final       Assessment/Plan   Problem List Items Addressed This Visit             ICD-10-CM    Generalized osteoarthritis - Primary M15.9    Relevant Orders    Drug Screen, Urine With Reflex to Confirmation     Other Visit Diagnoses         Codes    Flu vaccine need     Z23    Relevant Orders    Flu vaccine, trivalent, preservative free, age 6 months and greater (Fluarix/Fluzone/Flulaval) (Completed)          Checking patient's urine tox.  If negative will treat with twice a day morphine.  Follow-up in 2 months.

## 2024-09-27 DIAGNOSIS — M15.9 GENERALIZED OSTEOARTHRITIS: Primary | ICD-10-CM

## 2024-09-28 NOTE — RESULT ENCOUNTER NOTE
Pt needs to repeat utox in 2 weeks Spoke with patient to let her know per Dr Lindsey Watson her echo was normal

## 2024-09-28 NOTE — PROGRESS NOTES
Subjective   Patient ID: Shakila Shah is a 48 y.o. male who presents for No chief complaint on file..  HPI    Patient Active Problem List   Diagnosis    Anxiety state    Erectile dysfunction of organic origin    Essential hypertension    Generalized osteoarthritis    Malignant neoplasm of prostate (Multi)    Moderate recurrent major depression (Multi)    Diverticulosis of colon    Elevated PSA    Gastritis    Hiatal hernia    Internal hemorrhoids    Irritation of right eye    Lower urinary obstructive symptom    Nausea, vomiting, and diarrhea    Trimalleolar fracture of ankle, closed    Uveitis    Eyes swollen    SCC (squamous cell carcinoma)    Premature ejaculation    History of robot-assisted laparoscopic radical prostatectomy    Erectile dysfunction after radical prostatectomy    Cervical spondylosis    Lumbar spondylosis    Impairment of balance    S/P insertion of penile implant       Social Connections: Not on file       Current Outpatient Medications on File Prior to Visit   Medication Sig Dispense Refill    acetaminophen (Tylenol) 500 mg tablet TAKE TWO TABLETS BY MOUTH EVERY 8 HOURS AS NEEDED FOR PAIN (VIAL) 180 tablet 11    amLODIPine (Norvasc) 10 mg tablet Take 1 tablet (10 mg) by mouth once daily. Take at 9 AM 90 tablet 1    buPROPion XL (Wellbutrin XL) 300 mg 24 hr tablet TAKE ONE TABLET BY MOUTH DAILY AT 9AM 90 tablet 1    celecoxib (CeleBREX) 100 mg capsule Take 1 capsule (100 mg) by mouth 2 times a day. 180 capsule 2    dicyclomine (Bentyl) 10 mg capsule Take 1 capsule (10 mg) by mouth 3 times a day. 270 capsule 1    ergocalciferol (Vitamin D-2) 1.25 MG (26211 UT) capsule Take 1 capsule (50,000 Units) by mouth 1 (one) time per week.      fluticasone (Flonase) 50 mcg/actuation nasal spray Administer 1 spray into each nostril once daily. Shake gently. Before first use, prime pump. After use, clean tip and replace cap. 16 g 5    gabapentin (Neurontin) 300 mg capsule Take 1 capsule (300 mg) by mouth  3 times a day. Take it night for 5 days, then increase to am and pm for 5 days and then three times a day 90 capsule 1    losartan-hydrochlorothiazide (Hyzaar) 100-25 mg tablet Take 1 tablet by mouth once daily. 90 tablet 3    mirtazapine (Remeron) 7.5 mg tablet Take 1 tablet (7.5 mg) by mouth once daily at bedtime.      miscellaneous medical supply (Blood Pressure Cuff) misc To be used daily 1 each 0    naltrexone (Depade) 50 mg tablet Take 1 tablet (50 mg) by mouth once daily.      nortriptyline (Pamelor) 25 mg capsule Take 1 capsule (25 mg) by mouth once daily at bedtime. Inc to 2 caps after 1 month 60 capsule 5    pantoprazole (ProtoNix) 40 mg EC tablet Take 1 tablet (40 mg) by mouth 2 times a day. Do not crush, chew, or split. 180 tablet 1    polyethylene glycol (Glycolax, Miralax) 17 gram/dose powder Mix 17 g of powder and drink if needed.      tamsulosin (Flomax) 0.4 mg 24 hr capsule Take 1 capsule (0.4 mg) by mouth once daily. 90 capsule 1    Vraylar 1.5 mg capsule Take 1 capsule (1.5 mg) by mouth once daily.       No current facility-administered medications on file prior to visit.        There were no vitals filed for this visit.  There were no vitals filed for this visit.    Review of Systems    Objective     Physical Exam    Lab on 09/26/2024   Component Date Value Ref Range Status    Amphetamine Screen, Urine 09/26/2024 Presumptive Negative  Presumptive Negative Final    CUTOFF LEVEL: 500 NG/ML   Cross-reactivity has been reported with high concentrations   of the following drugs: buproprion, chloroquine, chlorpromazine,   ephedrine, mephentermine, fenfluramine, phentermine,   phenylpropanolamine, pseudoephedrine, and propranolol.    Barbiturate Screen, Urine 09/26/2024 Presumptive Negative  Presumptive Negative Final    CUTOFF LEVEL: 200 NG/ML    Benzodiazepines Screen, Urine 09/26/2024 Presumptive Negative  Presumptive Negative Final    CUTOFF LEVEL: 200 NG/ML    Cannabinoid Screen, Urine 09/26/2024  Presumptive Positive (A)  Presumptive Negative Final    CUTOFF LEVEL: 50 NG/ML    Cocaine Metabolite Screen, Urine 09/26/2024 Presumptive Negative  Presumptive Negative Final    CUTOFF LEVEL: 150 NG/ML    Fentanyl Screen, Urine 09/26/2024 Presumptive Negative  Presumptive Negative Final    CUTOFF LEVEL: 5 NG/ML    Opiate Screen, Urine 09/26/2024 Presumptive Negative  Presumptive Negative Final    CUTOFF LEVEL: 300 NG/ML  The opiate screen does not detect fentanyl, meperidine, or   tramadol. Oxycodone is not consistently detected (refer to  Oxycodone Screen, Urine result).    Oxycodone Screen, Urine 09/26/2024 Presumptive Negative  Presumptive Negative Final    CUTOFF LEVEL: 100 NG/ML  This test will accurately detect both oxycodone and oxymorphone.    PCP Screen, Urine 09/26/2024 Presumptive Negative  Presumptive Negative Final    CUTOFF LEVEL:  25 NG/ML  Cross-reactivity has been reported with dextromethorphan.    Methadone Screen, Urine 09/26/2024 Presumptive Negative  Presumptive Negative Final    CUTOFF LEVEL: 150 NG/ML  The metabolite L-alpha-acetylmethadol (LAAM) is not  detected by this method in concentrations that would  be found in the urine of patients on LAAM therapy.       Assessment/Plan

## 2024-10-04 LAB — CARBOXYTHC UR-MCNC: 25 NG/ML

## 2024-10-14 ENCOUNTER — LAB (OUTPATIENT)
Dept: LAB | Facility: LAB | Age: 49
End: 2024-10-14
Payer: COMMERCIAL

## 2024-10-14 DIAGNOSIS — C61 MALIGNANT NEOPLASM OF PROSTATE (MULTI): ICD-10-CM

## 2024-10-14 DIAGNOSIS — M15.9 GENERALIZED OSTEOARTHRITIS: ICD-10-CM

## 2024-10-14 DIAGNOSIS — F41.1 ANXIETY STATE: ICD-10-CM

## 2024-10-14 DIAGNOSIS — K52.9 IBD (INFLAMMATORY BOWEL DISEASE): ICD-10-CM

## 2024-10-14 PROCEDURE — 80307 DRUG TEST PRSMV CHEM ANLYZR: CPT

## 2024-10-14 PROCEDURE — 80349 CANNABINOIDS NATURAL: CPT

## 2024-10-14 RX ORDER — NORTRIPTYLINE HYDROCHLORIDE 25 MG/1
25 CAPSULE ORAL NIGHTLY
Qty: 180 CAPSULE | Refills: 1 | Status: SHIPPED | OUTPATIENT
Start: 2024-10-14 | End: 2025-10-09

## 2024-10-14 RX ORDER — TAMSULOSIN HYDROCHLORIDE 0.4 MG/1
0.4 CAPSULE ORAL DAILY
Qty: 90 CAPSULE | Refills: 1 | Status: SHIPPED | OUTPATIENT
Start: 2024-10-14

## 2024-10-14 RX ORDER — DICYCLOMINE HYDROCHLORIDE 10 MG/1
10 CAPSULE ORAL 3 TIMES DAILY
Qty: 270 CAPSULE | Refills: 1 | Status: SHIPPED | OUTPATIENT
Start: 2024-10-14

## 2024-10-14 NOTE — TELEPHONE ENCOUNTER
Pt called apparently on a 3 way call with pharmacy   (pt was unaware of 3 way)  requesting refills on dicyclomine,tamsulosin, and nortriptyline.  Please send to [facility]    Last ov: 09/26  Next ov: 11/26

## 2024-10-18 ENCOUNTER — DOCUMENTATION (OUTPATIENT)
Dept: PHYSICAL THERAPY | Facility: HOSPITAL | Age: 49
End: 2024-10-18
Payer: COMMERCIAL

## 2024-10-18 NOTE — PROGRESS NOTES
Physical Therapy    Discharge Summary    Name: Shakila Shah  MRN: [id number]  : 1975  Date: 10/18/24    Discharge Summary: PT  Dx lumbar spondylosis  Date of mal; 24  Date of last visit 24  Total visit #8  Discharge 10-18-24  Plan of care . DC PT

## 2024-10-19 LAB — CARBOXYTHC UR-MCNC: 40 NG/ML

## 2024-10-29 ENCOUNTER — APPOINTMENT (OUTPATIENT)
Dept: UROLOGY | Facility: HOSPITAL | Age: 49
End: 2024-10-29
Payer: COMMERCIAL

## 2024-10-30 DIAGNOSIS — F41.1 ANXIETY STATE: ICD-10-CM

## 2024-10-30 RX ORDER — NORTRIPTYLINE HYDROCHLORIDE 25 MG/1
CAPSULE ORAL
Qty: 60 CAPSULE | Refills: 5 | Status: SHIPPED | OUTPATIENT
Start: 2024-10-30

## 2024-11-04 ENCOUNTER — TELEPHONE (OUTPATIENT)
Dept: PRIMARY CARE | Facility: CLINIC | Age: 49
End: 2024-11-04
Payer: COMMERCIAL

## 2024-11-06 DIAGNOSIS — F41.1 ANXIETY STATE: ICD-10-CM

## 2024-11-06 RX ORDER — NORTRIPTYLINE HYDROCHLORIDE 50 MG/1
50 CAPSULE ORAL NIGHTLY
Qty: 90 CAPSULE | Refills: 3 | Status: SHIPPED | OUTPATIENT
Start: 2024-11-06

## 2024-11-07 ENCOUNTER — APPOINTMENT (OUTPATIENT)
Dept: UROLOGY | Facility: HOSPITAL | Age: 49
End: 2024-11-07
Payer: COMMERCIAL

## 2024-11-13 ENCOUNTER — APPOINTMENT (OUTPATIENT)
Dept: RADIOLOGY | Facility: CLINIC | Age: 49
End: 2024-11-13
Payer: COMMERCIAL

## 2024-11-14 ENCOUNTER — APPOINTMENT (OUTPATIENT)
Dept: UROLOGY | Facility: HOSPITAL | Age: 49
End: 2024-11-14
Payer: COMMERCIAL

## 2024-11-14 DIAGNOSIS — Z96.0 S/P INSERTION OF PENILE IMPLANT: Primary | ICD-10-CM

## 2024-11-14 PROCEDURE — 99213 OFFICE O/P EST LOW 20 MIN: CPT | Performed by: UROLOGY

## 2024-11-14 NOTE — PROGRESS NOTES
FUV    Last seen - 8/8/24     HISTORY OF PRESENT ILLNESS:   Shakila Shah is a 48 y.o. male who is being seen today for POV    GGG2 PCa s/p RALP 11/2/23     PSA: 0.03 (4/30/24)     BETI: He is not wearing pads or Depends. No leakage.      ED: He is not able to have erections sufficient for intercourse. He is taking daily and OD dose of Cialis and Viagra with no improvement.  Tried AKIRA.  Doppler showed Veno-occlusive disease,  Started on trimix #5 at last visit. Not working well    Pt had IPP placed on 6/26/24 Coloplast Titan 26cm with no RTE, 125cc reservoir in L subrectus through counter-incision.  Discharged home from PACU.    Was in ER later that night with YVONNE drain not working, bulb was exchanged.  Seen by Elizabet Ogden on POD#2 for drain removal    7/11/24 - Having pain in scrotum, no f/c, no drainage    8/8/24 - Doing well, able to cycle, pain resolved    11/14/24 - Doing well. Happy with implant.  Sometimes pain with inflation    PAST MEDICAL HISTORY:  Past Medical History:   Diagnosis Date    Anxiety and depression     Diverticulosis of colon     Erectile dysfunction after radical prostatectomy     GERD (gastroesophageal reflux disease)     Hypertension     OA (osteoarthritis)     Overweight     Prostate CA (Multi)     s/p prostatectomy    SCC (squamous cell carcinoma)        PAST SURGICAL HISTORY:  Past Surgical History:   Procedure Laterality Date    ANKLE FRACTURE SURGERY Left 2021    PROSTATE BIOPSY  07/21/2022    MRI FUSION PROSTATE BIOPSY    PROSTATECTOMY  11/02/2022    Bilateral nerve-sparing radical prostatectomy with bilateral pelvic lymph node dissection        ALLERGIES:   No Known Allergies     MEDICATIONS:   Current Outpatient Medications   Medication Instructions    acetaminophen (Tylenol) 500 mg tablet TAKE TWO TABLETS BY MOUTH EVERY 8 HOURS AS NEEDED FOR PAIN (VIAL)    amLODIPine (NORVASC) 10 mg, oral, Daily, Take at 9 AM    buPROPion XL (WELLBUTRIN XL) 300 mg, oral, Daily, Take at 9 AM     celecoxib (CELEBREX) 100 mg, oral, 2 times daily    dicyclomine (BENTYL) 10 mg, oral, 3 times daily    ergocalciferol (VITAMIN D-2) 50,000 Units, oral, Weekly    fluticasone (Flonase) 50 mcg/actuation nasal spray 1 spray, Each Nostril, Daily, Shake gently. Before first use, prime pump. After use, clean tip and replace cap.    gabapentin (NEURONTIN) 300 mg, oral, 3 times daily, Take it night for 5 days, then increase to am and pm for 5 days and then three times a day    losartan-hydrochlorothiazide (Hyzaar) 100-25 mg tablet 1 tablet, oral, Daily    mirtazapine (REMERON) 7.5 mg, oral, Nightly    miscellaneous medical supply (Blood Pressure Cuff) misc To be used daily    naltrexone (DEPADE) 50 mg, oral, Daily    nortriptyline (PAMELOR) 50 mg, oral, Nightly    pantoprazole (PROTONIX) 40 mg, oral, 2 times daily, Do not crush, chew, or split.    polyethylene glycol (Glycolax, Miralax) 17 gram/dose powder Mix 17 g of powder and drink if needed.    tamsulosin (FLOMAX) 0.4 mg, oral, Daily    Vraylar 1.5 mg, oral, Daily        PHYSICAL EXAM:  There were no vitals taken for this visit.  Constitutional: Patient appears well-developed and well-nourished. No distress.    Pulmonary/Chest: Effort normal. No respiratory distress.   Abdominal: Soft, ND NT  : Incision healing well, tips symmetrical and mid glans, pump midline and depenedent  Musculoskeletal: Normal range of motion.    Neurological: Alert and oriented to person, place, and time.  Psychiatric: Normal mood and affect. Behavior is normal. Thought content normal.      Labs  Lab Results   Component Value Date    TESTOSTERONE 295 03/16/2022     Lab Results   Component Value Date    PSA 0.03 04/30/2024     Lab Results   Component Value Date    GFRMALE 74 03/16/2023     Lab Results   Component Value Date    CREATININE 1.33 (H) 06/12/2024     Lab Results   Component Value Date    CHOL 187 04/30/2024     Lab Results   Component Value Date    HDL 28.6 04/30/2024     Lab Results    Component Value Date    CHHDL 6.5 04/30/2024     Lab Results   Component Value Date    LDLF 90 03/16/2022     Lab Results   Component Value Date    VLDL 39 04/30/2024     Lab Results   Component Value Date    TRIG 196 (H) 04/30/2024     Lab Results   Component Value Date    HCT 44.3 06/12/2024       Assessment:      1. S/P insertion of penile implant          Doing well     Plan:   No restrictions  OK for intercourse    FU as needed

## 2024-11-19 ENCOUNTER — LAB (OUTPATIENT)
Dept: LAB | Facility: LAB | Age: 49
End: 2024-11-19
Payer: COMMERCIAL

## 2024-11-19 ENCOUNTER — OFFICE VISIT (OUTPATIENT)
Dept: UROLOGY | Facility: HOSPITAL | Age: 49
End: 2024-11-19
Payer: COMMERCIAL

## 2024-11-19 VITALS
DIASTOLIC BLOOD PRESSURE: 79 MMHG | TEMPERATURE: 98.8 F | HEART RATE: 98 BPM | RESPIRATION RATE: 18 BRPM | OXYGEN SATURATION: 98 % | BODY MASS INDEX: 29.45 KG/M2 | SYSTOLIC BLOOD PRESSURE: 137 MMHG | WEIGHT: 205.25 LBS

## 2024-11-19 DIAGNOSIS — C61 MALIGNANT NEOPLASM OF PROSTATE (MULTI): ICD-10-CM

## 2024-11-19 DIAGNOSIS — C61 MALIGNANT NEOPLASM OF PROSTATE (MULTI): Primary | ICD-10-CM

## 2024-11-19 LAB — PSA SERPL-MCNC: 0.06 NG/ML

## 2024-11-19 PROCEDURE — 36415 COLL VENOUS BLD VENIPUNCTURE: CPT

## 2024-11-19 PROCEDURE — 84153 ASSAY OF PSA TOTAL: CPT

## 2024-11-19 PROCEDURE — 4004F PT TOBACCO SCREEN RCVD TLK: CPT | Performed by: STUDENT IN AN ORGANIZED HEALTH CARE EDUCATION/TRAINING PROGRAM

## 2024-11-19 PROCEDURE — 3078F DIAST BP <80 MM HG: CPT | Performed by: STUDENT IN AN ORGANIZED HEALTH CARE EDUCATION/TRAINING PROGRAM

## 2024-11-19 PROCEDURE — 3075F SYST BP GE 130 - 139MM HG: CPT | Performed by: STUDENT IN AN ORGANIZED HEALTH CARE EDUCATION/TRAINING PROGRAM

## 2024-11-19 PROCEDURE — 99213 OFFICE O/P EST LOW 20 MIN: CPT | Performed by: STUDENT IN AN ORGANIZED HEALTH CARE EDUCATION/TRAINING PROGRAM

## 2024-11-19 ASSESSMENT — PAIN SCALES - GENERAL: PAINLEVEL_OUTOF10: 7

## 2024-11-19 NOTE — PROGRESS NOTES
Subjective    Shakila Shah is a 49 y.o. male ith GGG 2 PCa 1.5 years s/p RALP who presents for 6 month FUV.      PSA: PSA is in process.     BETI: He is not wearing pads or Depends. No leakage.      ED: He has seen Dr. Dawkins for ICI. He is not able to have erections sufficient for intercourse. He is taking daily and OD dose of Cialis and Viagra with no improvement.  Tried AKIRA.  Doppler showed Veno-occlusive disease,  Started on trimix #5 at last visit. Not working well. Pt had IPP placed on 6/26/24 with Dr. Dawkins Coloplast Titan 26cm with no RTE, 125cc reservoir in L subrectus through counter-incision.  Doing well. Happy with implant. Sometimes pain with inflation     He has gained weight. He is eating well.     He has seen Dr. Torres for therapy for sexual health. He mentions it didn't really help.     Review of Systems    All systems were reviewed. Anything negative was noted in the HPI.    Objective   Physical Exam  Gen: No acute distress      Psych: Alert and oriented x3      Neuro:  Normal ROM     Resp: Nonlabored respirations      CV: Regular rate and rhythm      Abd: S, NT, ND.     : Deferred     Skin: Warm, dry and intact without rashes      Lymphatics: No peripheral edema           Past Medical History:   Diagnosis Date    Anxiety and depression     Diverticulosis of colon     Erectile dysfunction after radical prostatectomy     GERD (gastroesophageal reflux disease)     Hypertension     OA (osteoarthritis)     Overweight     Prostate CA (Multi)     s/p prostatectomy    SCC (squamous cell carcinoma)          Past Surgical History:   Procedure Laterality Date    ANKLE FRACTURE SURGERY Left 2021    PROSTATE BIOPSY  07/21/2022    MRI FUSION PROSTATE BIOPSY    PROSTATECTOMY  11/02/2022    Bilateral nerve-sparing radical prostatectomy with bilateral pelvic lymph node dissection         Assessment & Plan  Malignant neoplasm of prostate (Multi)  PSA: His PSA is in process. We will repeat this q 6 months for one  more year, then move this out to yearly.     BETI: No leakage    ED:  Pt had IPP placed on 6/26/24 with Dr. Mariza Rendon Titan 26cm with no RTE, 125cc reservoir in L subrectus through counter-incision.  Doing well. Happy with implant. He has seen Dr. Torres for therapy for sexual health. He mentions it didn't really help.     Orders:    Prostate Specific Antigen; Future      PLAN:  PSA in 6 months and follow-up labs from today.                         Scribe Attestation  By signing my name below, IDorinda Scribe   attest that this documentation has been prepared under the direction and in the presence of Regino Lee MD MPH

## 2024-11-19 NOTE — ASSESSMENT & PLAN NOTE
PSA: His PSA is in process. We will repeat this q 6 months for one more year, then move this out to yearly.     BETI: No leakage    ED:  Pt had IPP placed on 6/26/24 with Dr. Mariza Shipman 26cm with no RTE, 125cc reservoir in L subrectus through counter-incision.  Doing well. Happy with implant. He has seen Dr. Torres for therapy for sexual health. He mentions it didn't really help.     Orders:    Prostate Specific Antigen; Future

## 2024-11-26 ENCOUNTER — APPOINTMENT (OUTPATIENT)
Dept: PRIMARY CARE | Facility: CLINIC | Age: 49
End: 2024-11-26
Payer: COMMERCIAL

## 2024-11-26 VITALS
TEMPERATURE: 98.4 F | BODY MASS INDEX: 28.84 KG/M2 | HEART RATE: 107 BPM | DIASTOLIC BLOOD PRESSURE: 84 MMHG | OXYGEN SATURATION: 98 % | SYSTOLIC BLOOD PRESSURE: 128 MMHG | WEIGHT: 201 LBS

## 2024-11-26 DIAGNOSIS — R26.89 IMPAIRMENT OF BALANCE: ICD-10-CM

## 2024-11-26 DIAGNOSIS — K21.00 GASTROESOPHAGEAL REFLUX DISEASE WITH ESOPHAGITIS WITHOUT HEMORRHAGE: ICD-10-CM

## 2024-11-26 DIAGNOSIS — F41.1 ANXIETY STATE: ICD-10-CM

## 2024-11-26 DIAGNOSIS — K52.9 IBD (INFLAMMATORY BOWEL DISEASE): ICD-10-CM

## 2024-11-26 DIAGNOSIS — I10 PRIMARY HYPERTENSION: ICD-10-CM

## 2024-11-26 DIAGNOSIS — M47.812 CERVICAL SPONDYLOSIS: ICD-10-CM

## 2024-11-26 DIAGNOSIS — M47.816 LUMBAR SPONDYLOSIS: ICD-10-CM

## 2024-11-26 DIAGNOSIS — C61 MALIGNANT NEOPLASM OF PROSTATE (MULTI): ICD-10-CM

## 2024-11-26 PROCEDURE — 99214 OFFICE O/P EST MOD 30 MIN: CPT | Performed by: FAMILY MEDICINE

## 2024-11-26 PROCEDURE — 3074F SYST BP LT 130 MM HG: CPT | Performed by: FAMILY MEDICINE

## 2024-11-26 PROCEDURE — 3079F DIAST BP 80-89 MM HG: CPT | Performed by: FAMILY MEDICINE

## 2024-11-26 PROCEDURE — 4004F PT TOBACCO SCREEN RCVD TLK: CPT | Performed by: FAMILY MEDICINE

## 2024-11-26 RX ORDER — BUPROPION HYDROCHLORIDE 300 MG/1
300 TABLET ORAL DAILY
Qty: 90 TABLET | Refills: 1 | Status: SHIPPED | OUTPATIENT
Start: 2024-11-26

## 2024-11-26 RX ORDER — AMLODIPINE BESYLATE 10 MG/1
10 TABLET ORAL DAILY
Qty: 90 TABLET | Refills: 1 | Status: SHIPPED | OUTPATIENT
Start: 2024-11-26

## 2024-11-26 RX ORDER — PANTOPRAZOLE SODIUM 40 MG/1
40 TABLET, DELAYED RELEASE ORAL 2 TIMES DAILY
Qty: 180 TABLET | Refills: 1 | Status: SHIPPED | OUTPATIENT
Start: 2024-11-26 | End: 2025-05-25

## 2024-11-26 RX ORDER — DICYCLOMINE HYDROCHLORIDE 10 MG/1
10 CAPSULE ORAL 3 TIMES DAILY
Qty: 270 CAPSULE | Refills: 1 | Status: SHIPPED | OUTPATIENT
Start: 2024-11-26

## 2024-11-26 RX ORDER — GABAPENTIN 300 MG/1
300 CAPSULE ORAL 3 TIMES DAILY
Qty: 270 CAPSULE | Refills: 3 | Status: SHIPPED | OUTPATIENT
Start: 2024-11-26 | End: 2025-11-26

## 2024-11-26 ASSESSMENT — ENCOUNTER SYMPTOMS
INSOMNIA: 1
HYPERTENSION: 1

## 2024-11-26 NOTE — PROGRESS NOTES
Subjective   Patient ID: Shakila Shah is a 49 y.o. male who presents for Elevated PSA (recheck).  Hypertension    Insomnia    ED: Helped by Viagra    Prostate CA: Patient is following up with with urology.  Has been getting treatments which has been very difficult for him.    Anxiety: working with Psych who started him on Vraylar.    OA:  hurts in his neck, lumbar spine, elbows, feet. Knees.  Really his whole body hurts.  .  Unable to walk more than a block without a cane.  When lifting objects he gets shooting pain down his arms.  Hx of severe foraminal stenosis.    HTN: taking meds    Abdominal pain: Helped by the dicyclomine 3 times a day.  Needs refills.        Current Outpatient Medications on File Prior to Visit   Medication Sig Dispense Refill    acetaminophen (Tylenol) 500 mg tablet TAKE TWO TABLETS BY MOUTH EVERY 8 HOURS AS NEEDED FOR PAIN (VIAL) 180 tablet 11    celecoxib (CeleBREX) 100 mg capsule Take 1 capsule (100 mg) by mouth 2 times a day. 180 capsule 2    ergocalciferol (Vitamin D-2) 1.25 MG (88357 UT) capsule Take 1 capsule (50,000 Units) by mouth 1 (one) time per week.      fluticasone (Flonase) 50 mcg/actuation nasal spray Administer 1 spray into each nostril once daily. Shake gently. Before first use, prime pump. After use, clean tip and replace cap. 16 g 5    losartan-hydrochlorothiazide (Hyzaar) 100-25 mg tablet Take 1 tablet by mouth once daily. 90 tablet 3    mirtazapine (Remeron) 7.5 mg tablet Take 1 tablet (7.5 mg) by mouth once daily at bedtime.      miscellaneous medical supply (Blood Pressure Cuff) misc To be used daily 1 each 0    naltrexone (Depade) 50 mg tablet Take 1 tablet (50 mg) by mouth once daily.      nortriptyline (Pamelor) 50 mg capsule Take 1 capsule (50 mg) by mouth once daily at bedtime. 90 capsule 3    polyethylene glycol (Glycolax, Miralax) 17 gram/dose powder Mix 17 g of powder and drink if needed.      tamsulosin (Flomax) 0.4 mg 24 hr capsule Take 1 capsule (0.4 mg)  by mouth once daily. 90 capsule 1    Vraylar 1.5 mg capsule Take 1 capsule (1.5 mg) by mouth once daily.      [DISCONTINUED] amLODIPine (Norvasc) 10 mg tablet Take 1 tablet (10 mg) by mouth once daily. Take at 9 AM 90 tablet 1    [DISCONTINUED] buPROPion XL (Wellbutrin XL) 300 mg 24 hr tablet TAKE ONE TABLET BY MOUTH DAILY AT 9AM 90 tablet 1    [DISCONTINUED] dicyclomine (Bentyl) 10 mg capsule Take 1 capsule (10 mg) by mouth 3 times a day. 270 capsule 1    [DISCONTINUED] gabapentin (Neurontin) 300 mg capsule Take 1 capsule (300 mg) by mouth 3 times a day. Take it night for 5 days, then increase to am and pm for 5 days and then three times a day 90 capsule 1    [DISCONTINUED] pantoprazole (ProtoNix) 40 mg EC tablet Take 1 tablet (40 mg) by mouth 2 times a day. Do not crush, chew, or split. 180 tablet 1     No current facility-administered medications on file prior to visit.        Vitals:    11/26/24 1331   BP: 128/84   Pulse: 107   Temp: 36.9 °C (98.4 °F)   SpO2: 98%       Review of Systems   All other systems reviewed and are negative.      Objective     Physical Exam  Constitutional:       Appearance: Normal appearance. He is well-developed.   HENT:      Head: Atraumatic.   Cardiovascular:      Rate and Rhythm: Normal rate and regular rhythm.      Heart sounds: Normal heart sounds. No murmur heard.  Pulmonary:      Effort: Pulmonary effort is normal.      Breath sounds: Normal breath sounds.   Abdominal:      General: Bowel sounds are normal.      Palpations: Abdomen is soft.   Skin:     General: Skin is warm.   Neurological:      General: No focal deficit present.      Mental Status: He is alert.   Psychiatric:         Mood and Affect: Mood normal.         Lab on 11/19/2024   Component Date Value Ref Range Status    Prostate Specific AG 11/19/2024 0.06  <=4.00 ng/mL Final   Lab on 10/14/2024   Component Date Value Ref Range Status    Amphetamine Screen, Urine 10/14/2024 Presumptive Negative  Presumptive Negative  Final    CUTOFF LEVEL: 500 NG/ML   Cross-reactivity has been reported with high concentrations   of the following drugs: buproprion, chloroquine, chlorpromazine,   ephedrine, mephentermine, fenfluramine, phentermine,   phenylpropanolamine, pseudoephedrine, and propranolol.    Barbiturate Screen, Urine 10/14/2024 Presumptive Negative  Presumptive Negative Final    CUTOFF LEVEL: 200 NG/ML    Benzodiazepines Screen, Urine 10/14/2024 Presumptive Negative  Presumptive Negative Final    CUTOFF LEVEL: 200 NG/ML    Cannabinoid Screen, Urine 10/14/2024 Presumptive Positive (A)  Presumptive Negative Final    CUTOFF LEVEL: 50 NG/ML    Cocaine Metabolite Screen, Urine 10/14/2024 Presumptive Negative  Presumptive Negative Final    CUTOFF LEVEL: 150 NG/ML    Fentanyl Screen, Urine 10/14/2024 Presumptive Negative  Presumptive Negative Final    CUTOFF LEVEL: 5 NG/ML    Opiate Screen, Urine 10/14/2024 Presumptive Negative  Presumptive Negative Final    CUTOFF LEVEL: 300 NG/ML  The opiate screen does not detect fentanyl, meperidine, or   tramadol. Oxycodone is not consistently detected (refer to  Oxycodone Screen, Urine result).    Oxycodone Screen, Urine 10/14/2024 Presumptive Negative  Presumptive Negative Final    CUTOFF LEVEL: 100 NG/ML  This test will accurately detect both oxycodone and oxymorphone.    PCP Screen, Urine 10/14/2024 Presumptive Negative  Presumptive Negative Final    CUTOFF LEVEL:  25 NG/ML  Cross-reactivity has been reported with dextromethorphan.    Methadone Screen, Urine 10/14/2024 Presumptive Negative  Presumptive Negative Final    CUTOFF LEVEL: 150 NG/ML  The metabolite L-alpha-acetylmethadol (LAAM) is not  detected by this method in concentrations that would  be found in the urine of patients on LAAM therapy.    00-Ohn-3-carboxy-THC, Urn, Quant 10/14/2024 40  ng/mL Final    Comment: INTERPRETIVE INFORMATION: THC Metabolite, Urine,                             Quantitative    Methodology: Quantitative Liquid  Chromatography-Tandem Mass   Spectrometry  Positive cutoff: 15 ng/mL  For medical purposes only; not valid for forensic use.  The drug analyte detected in this assay, 9-carboxy THC, is a   metabolite of delta-9-tetrahydrocannabinol (THC). Detection of   9-carboxy THC suggests use of, or exposure to, a product   containing THC.  This test cannot distinguish between prescribed   or non-prescribed forms of THC, nor can it distinguish between   active or passive use. The 9-carboxy THC metabolite can be   detected in urine for several weeks. Normalization of results to   creatinine concentration can help document elimination or suggest   recent use, when specimens are collected at least one week apart.    This test was developed and its performance characteristics   determined by [facility]. It has not been cleared or   approved by the [place] Food and Drug                            Administration. This test was   performed in a CLIA certified laboratory and is intended for   clinical purposes.  Performed By: [facility]  [address] [place] [id number]  : Santiago Casillas MD, PhD  CLIA Number: [id number]       Assessment/Plan   Problem List Items Addressed This Visit       Anxiety state    Relevant Medications    buPROPion XL (Wellbutrin XL) 300 mg 24 hr tablet    Malignant neoplasm of prostate (Multi)    Relevant Medications    gabapentin (Neurontin) 300 mg capsule    Cervical spondylosis    Relevant Medications    gabapentin (Neurontin) 300 mg capsule    Lumbar spondylosis    Relevant Medications    gabapentin (Neurontin) 300 mg capsule    Impairment of balance    Relevant Medications    gabapentin (Neurontin) 300 mg capsule     Other Visit Diagnoses       Gastroesophageal reflux disease with esophagitis without hemorrhage        Relevant Medications    pantoprazole (ProtoNix) 40 mg EC tablet    IBD (inflammatory bowel disease)        Relevant Medications    dicyclomine  (Bentyl) 10 mg capsule    Primary hypertension        Relevant Medications    amLODIPine (Norvasc) 10 mg tablet          Patient is doing well.  Refilled pts meds.  Follow up in 6 mo.  FU with urology re: increasing PSA.

## 2025-03-05 DIAGNOSIS — I10 ESSENTIAL HYPERTENSION: ICD-10-CM

## 2025-03-05 RX ORDER — LOSARTAN POTASSIUM AND HYDROCHLOROTHIAZIDE 25; 100 MG/1; MG/1
1 TABLET ORAL DAILY
Qty: 90 TABLET | Refills: 0 | Status: SHIPPED | OUTPATIENT
Start: 2025-03-05

## 2025-04-07 DIAGNOSIS — M15.9 OSTEOARTHRITIS, GENERALIZED: ICD-10-CM

## 2025-04-07 RX ORDER — ACETAMINOPHEN 500 MG
TABLET ORAL
Qty: 180 TABLET | Refills: 11 | Status: SHIPPED | OUTPATIENT
Start: 2025-04-07

## 2025-04-15 ENCOUNTER — OFFICE VISIT (OUTPATIENT)
Dept: URGENT CARE | Age: 50
End: 2025-04-15
Payer: COMMERCIAL

## 2025-04-15 VITALS
TEMPERATURE: 98.3 F | HEART RATE: 110 BPM | OXYGEN SATURATION: 97 % | SYSTOLIC BLOOD PRESSURE: 118 MMHG | DIASTOLIC BLOOD PRESSURE: 85 MMHG

## 2025-04-15 DIAGNOSIS — R09.81 NASAL CONGESTION: Primary | ICD-10-CM

## 2025-04-15 DIAGNOSIS — H91.91 CHANGE IN HEARING OF RIGHT EAR: ICD-10-CM

## 2025-04-15 PROCEDURE — 3074F SYST BP LT 130 MM HG: CPT | Performed by: NURSE PRACTITIONER

## 2025-04-15 PROCEDURE — 99213 OFFICE O/P EST LOW 20 MIN: CPT | Performed by: NURSE PRACTITIONER

## 2025-04-15 PROCEDURE — 3079F DIAST BP 80-89 MM HG: CPT | Performed by: NURSE PRACTITIONER

## 2025-04-15 RX ORDER — FLUTICASONE PROPIONATE 50 MCG
1 SPRAY, SUSPENSION (ML) NASAL 2 TIMES DAILY
Qty: 16 G | Refills: 0 | Status: SHIPPED | OUTPATIENT
Start: 2025-04-15 | End: 2026-04-15

## 2025-04-15 RX ORDER — CETIRIZINE HYDROCHLORIDE 10 MG/1
10 TABLET ORAL DAILY
Qty: 30 TABLET | Refills: 0 | Status: SHIPPED | OUTPATIENT
Start: 2025-04-15 | End: 2026-04-15

## 2025-04-15 ASSESSMENT — ENCOUNTER SYMPTOMS
COUGH: 0
HEADACHES: 0
RHINORRHEA: 0
DIARRHEA: 0
SORE THROAT: 0
VOMITING: 0
NECK PAIN: 0
ABDOMINAL PAIN: 0

## 2025-04-15 NOTE — PATIENT INSTRUCTIONS
Supportive care of nasal congestion discussed.   Take Flonase 1 spray in each nostril two times daily.  Take Zyrtec I tab by mouth daily for the next 1-2 weeks.  Return or follow-up with primary care provider if symptoms did not improve and/or pt developed purulent nasal discharge, worsening cough, fever, worsening sore throat, ear pain, sinus pain and headaches.  Call 911 or go to the nearest emergency room if symptoms became severe such as severe pain, shortness of breath, chest tight ness.

## 2025-04-15 NOTE — PROGRESS NOTES
Subjective   Patient ID: Shakila Shah is a 49 y.o. male. They present today with a chief complaint of Ear Fullness.    History of Present Illness    History provided by:  Patient   used: No    Earache   There is pain in the right (Muffled hearing in the right ear. Pt denies ear pain) ear. This is a new problem. Episode onset: 10 days. The problem occurs constantly. The problem has been gradually worsening. There has been no fever. The patient is experiencing no pain. Pertinent negatives include no abdominal pain, coughing, diarrhea, ear discharge, headaches, hearing loss, neck pain, rash, rhinorrhea, sore throat or vomiting. Associated symptoms comments: Nasal congestion right worse than left. Treatments tried: Netipot. The treatment provided no relief.       Past Medical History  Allergies as of 04/15/2025    (No Known Allergies)       (Not in a hospital admission)       Past Medical History:   Diagnosis Date    Anxiety and depression     Diverticulosis of colon     Erectile dysfunction after radical prostatectomy     GERD (gastroesophageal reflux disease)     Hypertension     OA (osteoarthritis)     Overweight     Prostate CA (Multi)     s/p prostatectomy    SCC (squamous cell carcinoma)        Past Surgical History:   Procedure Laterality Date    ANKLE FRACTURE SURGERY Left 2021    PROSTATE BIOPSY  07/21/2022    MRI FUSION PROSTATE BIOPSY    PROSTATECTOMY  11/02/2022    Bilateral nerve-sparing radical prostatectomy with bilateral pelvic lymph node dissection        reports that he has been smoking cigarettes. He started smoking about 29 years ago. He has a 29.3 pack-year smoking history. He has never used smokeless tobacco. He reports that he does not currently use alcohol. He reports that he does not currently use drugs after having used the following drugs: Marijuana.    Review of Systems  Review of Systems   HENT:  Positive for ear pain. Negative for ear discharge, hearing loss,  rhinorrhea and sore throat.    Respiratory:  Negative for cough.    Gastrointestinal:  Negative for abdominal pain, diarrhea and vomiting.   Musculoskeletal:  Negative for neck pain.   Skin:  Negative for rash.   Neurological:  Negative for headaches.          Objective    Vitals:    04/15/25 1554   BP: 118/85   Pulse: 110   Temp: 36.8 °C (98.3 °F)   SpO2: 97%     No LMP for male patient.    Physical Exam  Vitals and nursing note reviewed.   Constitutional:       Appearance: Normal appearance.   HENT:      Head: Normocephalic and atraumatic.      Right Ear: Hearing, tympanic membrane, ear canal and external ear normal.      Left Ear: Hearing, tympanic membrane, ear canal and external ear normal.      Nose: Mucosal edema and congestion present. No nasal deformity, septal deviation, signs of injury, laceration, nasal tenderness or rhinorrhea.      Right Sinus: No maxillary sinus tenderness or frontal sinus tenderness.      Left Sinus: No maxillary sinus tenderness or frontal sinus tenderness.      Mouth/Throat:      Lips: Pink.      Mouth: Mucous membranes are moist.      Pharynx: Uvula midline.      Tonsils: No tonsillar exudate or tonsillar abscesses.   Cardiovascular:      Rate and Rhythm: Normal rate and regular rhythm.      Heart sounds: Normal heart sounds.   Pulmonary:      Effort: Pulmonary effort is normal.      Breath sounds: Normal breath sounds and air entry.   Musculoskeletal:      Cervical back: Normal range of motion and neck supple.   Lymphadenopathy:      Cervical: No cervical adenopathy.   Neurological:      Mental Status: He is alert.   Psychiatric:         Mood and Affect: Mood normal.         Behavior: Behavior normal.         Procedures    Point of Care Test & Imaging Results from this visit  No results found for this visit on 04/15/25.   Imaging  No results found.    Cardiology, Vascular, and Other Imaging  No other imaging results found for the past 2 days      Diagnostic study results (if any)  were reviewed by NADEEN Keys.    Assessment/Plan   Allergies, medications, history, and pertinent labs/EKGs/Imaging reviewed by NADEEN Keys.     Medical Decision Making  Discussed with patient tobacco and/or cannabinoid use cessation for 3-4 minutes.  Supportive care of nasal congestion discussed.   Take Flonase 1 spray in each nostril two times daily.  Take Zyrtec I tab by mouth daily for the next 1-2 weeks.  Return or follow-up with primary care provider if symptoms did not improve and/or pt developed purulent nasal discharge, worsening cough, fever, worsening sore throat, ear pain, sinus pain and headaches.  Call 911 or go to the nearest emergency room if symptoms became severe such as severe pain, shortness of breath, chest tight ness.   Patient verbalized understanding of the instructions and left in stable condition.     Orders and Diagnoses  Diagnoses and all orders for this visit:  Nasal congestion  -     fluticasone (Flonase) 50 mcg/actuation nasal spray; Administer 1 spray into each nostril 2 times a day. Shake gently. Before first use, prime pump. After use, clean tip and replace cap.  -     cetirizine (ZyrTEC) 10 mg tablet; Take 1 tablet (10 mg) by mouth once daily.  Change in hearing of right ear  -     fluticasone (Flonase) 50 mcg/actuation nasal spray; Administer 1 spray into each nostril 2 times a day. Shake gently. Before first use, prime pump. After use, clean tip and replace cap.  -     cetirizine (ZyrTEC) 10 mg tablet; Take 1 tablet (10 mg) by mouth once daily.      Medical Admin Record      Patient disposition: Home    Electronically signed by NADEEN Keys  4:29 PM

## 2025-05-13 PROCEDURE — 80053 COMPREHEN METABOLIC PANEL: CPT

## 2025-05-13 PROCEDURE — 36415 COLL VENOUS BLD VENIPUNCTURE: CPT

## 2025-05-13 PROCEDURE — 84153 ASSAY OF PSA TOTAL: CPT

## 2025-05-14 ENCOUNTER — LAB (OUTPATIENT)
Dept: LAB | Facility: HOSPITAL | Age: 50
End: 2025-05-14
Payer: COMMERCIAL

## 2025-05-14 DIAGNOSIS — C61 MALIGNANT NEOPLASM OF PROSTATE (MULTI): Primary | ICD-10-CM

## 2025-05-14 LAB — PSA SERPL-MCNC: 0.11 NG/ML

## 2025-05-15 ENCOUNTER — APPOINTMENT (OUTPATIENT)
Dept: PRIMARY CARE | Facility: CLINIC | Age: 50
End: 2025-05-15
Payer: COMMERCIAL

## 2025-05-15 VITALS
HEART RATE: 129 BPM | SYSTOLIC BLOOD PRESSURE: 104 MMHG | TEMPERATURE: 97 F | BODY MASS INDEX: 30.71 KG/M2 | OXYGEN SATURATION: 98 % | WEIGHT: 214 LBS | DIASTOLIC BLOOD PRESSURE: 64 MMHG

## 2025-05-15 DIAGNOSIS — C61 MALIGNANT NEOPLASM OF PROSTATE (MULTI): ICD-10-CM

## 2025-05-15 DIAGNOSIS — Z12.5 SCREENING FOR PROSTATE CANCER: ICD-10-CM

## 2025-05-15 DIAGNOSIS — F41.1 ANXIETY STATE: ICD-10-CM

## 2025-05-15 DIAGNOSIS — M47.812 CERVICAL SPONDYLOSIS: ICD-10-CM

## 2025-05-15 DIAGNOSIS — I10 PRIMARY HYPERTENSION: ICD-10-CM

## 2025-05-15 DIAGNOSIS — M47.816 LUMBAR SPONDYLOSIS: Primary | ICD-10-CM

## 2025-05-15 DIAGNOSIS — R06.81 WITNESSED EPISODE OF APNEA: ICD-10-CM

## 2025-05-15 DIAGNOSIS — I10 ESSENTIAL HYPERTENSION: ICD-10-CM

## 2025-05-15 LAB
ALBUMIN SERPL BCP-MCNC: 4.1 G/DL (ref 3.4–5)
ALP SERPL-CCNC: 66 U/L (ref 33–120)
ALT SERPL W P-5'-P-CCNC: 24 U/L (ref 10–52)
ANION GAP SERPL CALC-SCNC: 18 MMOL/L (ref 10–20)
AST SERPL W P-5'-P-CCNC: 16 U/L (ref 9–39)
BILIRUB SERPL-MCNC: 0.6 MG/DL (ref 0–1.2)
BUN SERPL-MCNC: 12 MG/DL (ref 6–23)
CALCIUM SERPL-MCNC: 9.5 MG/DL (ref 8.6–10.6)
CHLORIDE SERPL-SCNC: 105 MMOL/L (ref 98–107)
CO2 SERPL-SCNC: 23 MMOL/L (ref 21–32)
CREAT SERPL-MCNC: 1.51 MG/DL (ref 0.5–1.3)
EGFRCR SERPLBLD CKD-EPI 2021: 57 ML/MIN/1.73M*2
GLUCOSE SERPL-MCNC: 120 MG/DL (ref 74–99)
POTASSIUM SERPL-SCNC: 4 MMOL/L (ref 3.5–5.3)
PROT SERPL-MCNC: 7 G/DL (ref 6.4–8.2)
SODIUM SERPL-SCNC: 142 MMOL/L (ref 136–145)

## 2025-05-15 PROCEDURE — 3074F SYST BP LT 130 MM HG: CPT | Performed by: FAMILY MEDICINE

## 2025-05-15 PROCEDURE — 3078F DIAST BP <80 MM HG: CPT | Performed by: FAMILY MEDICINE

## 2025-05-15 PROCEDURE — 99214 OFFICE O/P EST MOD 30 MIN: CPT | Performed by: FAMILY MEDICINE

## 2025-05-15 RX ORDER — LOSARTAN POTASSIUM AND HYDROCHLOROTHIAZIDE 25; 100 MG/1; MG/1
1 TABLET ORAL DAILY
Qty: 90 TABLET | Refills: 1 | Status: SHIPPED | OUTPATIENT
Start: 2025-05-15

## 2025-05-15 RX ORDER — AMLODIPINE BESYLATE 10 MG/1
10 TABLET ORAL DAILY
Qty: 90 TABLET | Refills: 1 | Status: SHIPPED | OUTPATIENT
Start: 2025-05-15

## 2025-05-15 RX ORDER — MIRTAZAPINE 15 MG/1
15 TABLET, FILM COATED ORAL EVERY EVENING
COMMUNITY
Start: 2025-02-19

## 2025-05-15 RX ORDER — BUPROPION HYDROCHLORIDE 150 MG/1
150 TABLET ORAL EVERY MORNING
COMMUNITY
Start: 2025-01-08

## 2025-05-15 ASSESSMENT — ENCOUNTER SYMPTOMS
INSOMNIA: 1
HYPERTENSION: 1

## 2025-05-15 NOTE — PROGRESS NOTES
Subjective   Patient ID: Shakila Shah is a 49 y.o. male who presents for Hypertension (Recheck ).  Hypertension    Insomnia    ED: Helped by Viagra    Prostate CA: Patient is following up with with urology.  Has been getting treatments which has been very difficult for him.    Anxiety: working with Psych who started him on Vraylar.    OA:  hurts in his neck, lumbar spine, elbows, feet. Knees.  Really his whole body hurts.  .  Unable to walk more than a block without a cane.  When lifting objects he gets shooting pain down his arms.  Hx of severe foraminal stenosis.    HTN: taking meds    Abdominal pain: Helped by the dicyclomine 3 times a day.  Needs refills.        Current Outpatient Medications on File Prior to Visit   Medication Sig Dispense Refill    acetaminophen (Tylenol) 500 mg tablet TAKE TWO TABLETS BY MOUTH EVERY 8 HOURS AS NEEDED FOR PAIN 180 tablet 11    amLODIPine (Norvasc) 10 mg tablet Take 1 tablet (10 mg) by mouth once daily. Take at 9 AM 90 tablet 1    buPROPion XL (Wellbutrin XL) 150 mg 24 hr tablet Take 1 tablet (150 mg) by mouth once daily in the morning.      buPROPion XL (Wellbutrin XL) 300 mg 24 hr tablet Take 1 tablet (300 mg) by mouth once daily. Take at 9 AM 90 tablet 1    celecoxib (CeleBREX) 100 mg capsule Take 1 capsule (100 mg) by mouth 2 times a day. 180 capsule 2    cetirizine (ZyrTEC) 10 mg tablet Take 1 tablet (10 mg) by mouth once daily. 30 tablet 0    dicyclomine (Bentyl) 10 mg capsule Take 1 capsule (10 mg) by mouth 3 times a day. 270 capsule 1    ergocalciferol (Vitamin D-2) 1.25 MG (32534 UT) capsule Take 1 capsule (50,000 Units) by mouth 1 (one) time per week.      fluticasone (Flonase) 50 mcg/actuation nasal spray Administer 1 spray into each nostril 2 times a day. Shake gently. Before first use, prime pump. After use, clean tip and replace cap. 16 g 0    gabapentin (Neurontin) 300 mg capsule Take 1 capsule (300 mg) by mouth 3 times a day. Take it night for 5 days, then  increase to am and pm for 5 days and then three times a day 270 capsule 3    losartan-hydrochlorothiazide (Hyzaar) 100-25 mg tablet TAKE ONE TABLET BY MOUTH DAILY AT 9AM 90 tablet 0    mirtazapine (Remeron) 15 mg tablet Take 1 tablet (15 mg) by mouth once daily in the evening.      miscellaneous medical supply (Blood Pressure Cuff) misc To be used daily 1 each 0    naltrexone (Depade) 50 mg tablet Take 1 tablet (50 mg) by mouth once daily.      nortriptyline (Pamelor) 50 mg capsule Take 1 capsule (50 mg) by mouth once daily at bedtime. 90 capsule 3    pantoprazole (ProtoNix) 40 mg EC tablet Take 1 tablet (40 mg) by mouth 2 times a day. Do not crush, chew, or split. 180 tablet 1    polyethylene glycol (Glycolax, Miralax) 17 gram/dose powder Mix 17 g of powder and drink if needed.      tamsulosin (Flomax) 0.4 mg 24 hr capsule Take 1 capsule (0.4 mg) by mouth once daily. 90 capsule 1    Vraylar 1.5 mg capsule Take 1 capsule (1.5 mg) by mouth once daily.      [DISCONTINUED] mirtazapine (Remeron) 7.5 mg tablet Take 1 tablet (7.5 mg) by mouth once daily at bedtime.       No current facility-administered medications on file prior to visit.        Vitals:    05/15/25 1445   BP: 104/64   Pulse: (!) 129   Temp: 36.1 °C (97 °F)   SpO2: 98%       Review of Systems   All other systems reviewed and are negative.      Objective     Physical Exam  Constitutional:       Appearance: Normal appearance. He is well-developed.   HENT:      Head: Atraumatic.   Cardiovascular:      Rate and Rhythm: Normal rate and regular rhythm.      Heart sounds: Normal heart sounds. No murmur heard.  Pulmonary:      Effort: Pulmonary effort is normal.      Breath sounds: Normal breath sounds.   Abdominal:      General: Bowel sounds are normal.      Palpations: Abdomen is soft.   Skin:     General: Skin is warm.   Neurological:      General: No focal deficit present.      Mental Status: He is alert.   Psychiatric:         Mood and Affect: Mood normal.          Lab on 05/14/2025   Component Date Value Ref Range Status    Prostate Specific AG 05/13/2025 0.11  <=4.00 ng/mL Final       Assessment/Plan   Problem List Items Addressed This Visit       Anxiety state    Essential hypertension    Malignant neoplasm of prostate (Multi)    Cervical spondylosis    Lumbar spondylosis - Primary     Other Visit Diagnoses         Primary hypertension              Patient is doing well.  Refilled pts meds.  Follow up in 6 mo.  FU with urology re: increasing PSA.

## 2025-05-16 NOTE — RESULT ENCOUNTER NOTE
Pts kidney function was worse than last time.  Try to stay well hydrated.  Avoid NSAIDs like ibuprofen and naproxen.

## 2025-05-19 ENCOUNTER — TELEPHONE (OUTPATIENT)
Dept: PRIMARY CARE | Facility: CLINIC | Age: 50
End: 2025-05-19
Payer: COMMERCIAL

## 2025-05-19 NOTE — TELEPHONE ENCOUNTER
----- Message from Jay Hastings sent at 5/16/2025  5:25 PM EDT -----  Pts kidney function was worse than last time.  Try to stay well hydrated.  Avoid NSAIDs like ibuprofen and naproxen.  ----- Message -----  From: Lab, Background User  Sent: 5/15/2025   3:36 PM EDT  To: Jay Hastings MD

## 2025-05-20 ENCOUNTER — OFFICE VISIT (OUTPATIENT)
Dept: UROLOGY | Facility: HOSPITAL | Age: 50
End: 2025-05-20
Payer: COMMERCIAL

## 2025-05-20 VITALS
DIASTOLIC BLOOD PRESSURE: 82 MMHG | WEIGHT: 214 LBS | SYSTOLIC BLOOD PRESSURE: 135 MMHG | TEMPERATURE: 97.7 F | OXYGEN SATURATION: 99 % | RESPIRATION RATE: 16 BRPM | BODY MASS INDEX: 30.71 KG/M2 | HEART RATE: 110 BPM

## 2025-05-20 DIAGNOSIS — C61 MALIGNANT NEOPLASM OF PROSTATE (MULTI): Primary | ICD-10-CM

## 2025-05-20 PROCEDURE — 99213 OFFICE O/P EST LOW 20 MIN: CPT | Performed by: STUDENT IN AN ORGANIZED HEALTH CARE EDUCATION/TRAINING PROGRAM

## 2025-05-20 PROCEDURE — 3075F SYST BP GE 130 - 139MM HG: CPT | Performed by: STUDENT IN AN ORGANIZED HEALTH CARE EDUCATION/TRAINING PROGRAM

## 2025-05-20 PROCEDURE — 3079F DIAST BP 80-89 MM HG: CPT | Performed by: STUDENT IN AN ORGANIZED HEALTH CARE EDUCATION/TRAINING PROGRAM

## 2025-05-20 ASSESSMENT — PAIN SCALES - GENERAL: PAINLEVEL_OUTOF10: 7

## 2025-05-20 NOTE — PROGRESS NOTES
Subjective    Shakila Shah is a 49 y.o. male with GGG 2 PCa s/p RALP on 11/2/22 who presents for 6 month FUV.      PSA: PSA is 0.11.     BETI: He is not wearing pads or Depends. No leakage.      ED: He has seen Dr. Dawkins for ICI. Hr has IPP. Dealing with autoinflation.    Review of Systems    All systems were reviewed. Anything negative was noted in the HPI.    Objective   Physical Exam  Gen: No acute distress      Psych: Alert and oriented x3      Neuro:  Normal ROM     Resp: Nonlabored respirations      CV: Regular rate and rhythm      Abd: S, NT, ND.     : Deferred     Skin: Warm, dry and intact without rashes      Lymphatics: No peripheral edema           Medical History[1]      Surgical History[2]      Assessment & Plan  Malignant neoplasm of prostate (Multi)  PSA: PSA is 0.11. increase in PSA, not yet reached BCR threshold (0.20). Repeat PSA and FUV in 3-4 months.  Discussed PSMA now or at threshold and discussed rad onc referral now or at threshold and will continue to watch until we meet threshold.     BETI: He is not wearing pads or Depends. No leakage.      ED: He has seen Dr. Dawkins for ICI. Hr has IPP. Dealing with autoinflation. Will message Dr. Dawkins to reach out.                                      Scribe Attestation  By signing my name below, IDorinda, Tori   attest that this documentation has been prepared under the direction and in the presence of Regino Lee MD MPH         [1]   Past Medical History:  Diagnosis Date    Anxiety and depression     Diverticulosis of colon     Erectile dysfunction after radical prostatectomy     GERD (gastroesophageal reflux disease)     Hypertension     OA (osteoarthritis)     Overweight     Prostate CA (Multi)     s/p prostatectomy    SCC (squamous cell carcinoma)    [2]   Past Surgical History:  Procedure Laterality Date    ANKLE FRACTURE SURGERY Left 2021    PROSTATE BIOPSY  07/21/2022    MRI FUSION PROSTATE BIOPSY    PROSTATECTOMY  11/02/2022     Bilateral nerve-sparing radical prostatectomy with bilateral pelvic lymph node dissection

## 2025-05-20 NOTE — ASSESSMENT & PLAN NOTE
PSA: PSA is 0.11. increase in PSA, not yet reached BCR threshold (0.20). Repeat PSA and FUV in 3-4 months.  Discussed PSMA now or at threshold and discussed rad onc referral now or at threshold and will continue to watch until we meet threshold.     BETI: He is not wearing pads or Depends. No leakage.      ED: He has seen Dr. Dawkins for ICI. Hr has IPP. Dealing with autoinflation. Will message Dr. Dawkins to reach out.

## 2025-05-22 ENCOUNTER — OFFICE VISIT (OUTPATIENT)
Dept: UROLOGY | Facility: HOSPITAL | Age: 50
End: 2025-05-22
Payer: COMMERCIAL

## 2025-05-22 DIAGNOSIS — C61 MALIGNANT NEOPLASM OF PROSTATE (MULTI): ICD-10-CM

## 2025-05-22 DIAGNOSIS — Z96.0 S/P INSERTION OF PENILE IMPLANT: ICD-10-CM

## 2025-05-22 DIAGNOSIS — N52.31 ERECTILE DYSFUNCTION AFTER RADICAL PROSTATECTOMY: Primary | ICD-10-CM

## 2025-05-22 PROCEDURE — 99213 OFFICE O/P EST LOW 20 MIN: CPT | Performed by: UROLOGY

## 2025-05-22 NOTE — PROGRESS NOTES
FUV    Last seen - 8/8/24     HISTORY OF PRESENT ILLNESS:   Shakila Shah is a 49 y.o. male who is being seen today for POV    GGG2 PCa s/p RALP 11/2/23, PSA: 0.11(5/13/25) Follows with Dr Rosa BANG: He is not wearing pads or Depends. No leakage. Some urgency     Pt had IPP placed on 6/26/24 Coloplast Titan 26cm with no RTE, 125cc reservoir in L subrectus through counter-incision.  Discharged home from PACU.    Having some auto-inflation   PAST MEDICAL HISTORY:  Past Medical History:   Diagnosis Date    Anxiety and depression     Diverticulosis of colon     Erectile dysfunction after radical prostatectomy     GERD (gastroesophageal reflux disease)     Hypertension     OA (osteoarthritis)     Overweight     Prostate CA (Multi)     s/p prostatectomy    SCC (squamous cell carcinoma)        PAST SURGICAL HISTORY:  Past Surgical History:   Procedure Laterality Date    ANKLE FRACTURE SURGERY Left 2021    PROSTATE BIOPSY  07/21/2022    MRI FUSION PROSTATE BIOPSY    PROSTATECTOMY  11/02/2022    Bilateral nerve-sparing radical prostatectomy with bilateral pelvic lymph node dissection        ALLERGIES:   No Known Allergies     MEDICATIONS:   Current Outpatient Medications   Medication Instructions    acetaminophen (Tylenol) 500 mg tablet TAKE TWO TABLETS BY MOUTH EVERY 8 HOURS AS NEEDED FOR PAIN    amLODIPine (NORVASC) 10 mg, oral, Daily, Take at 9 AM    buPROPion XL (WELLBUTRIN XL) 300 mg, oral, Daily, Take at 9 AM    buPROPion XL (WELLBUTRIN XL) 150 mg, Every morning    celecoxib (CELEBREX) 100 mg, oral, 2 times daily    cetirizine (ZYRTEC) 10 mg, oral, Daily    dicyclomine (BENTYL) 10 mg, oral, 3 times daily    ergocalciferol (VITAMIN D-2) 50,000 Units, Weekly    fluticasone (Flonase) 50 mcg/actuation nasal spray 1 spray, Each Nostril, 2 times daily, Shake gently. Before first use, prime pump. After use, clean tip and replace cap.    gabapentin (NEURONTIN) 300 mg, oral, 3 times daily, Take it night for 5 days, then  increase to am and pm for 5 days and then three times a day    losartan-hydrochlorothiazide (Hyzaar) 100-25 mg tablet 1 tablet, oral, Daily, Take at 9 AM    mirtazapine (REMERON) 15 mg, Every evening    miscellaneous medical supply (Blood Pressure Cuff) misc To be used daily    naltrexone (DEPADE) 50 mg, Daily    nortriptyline (PAMELOR) 50 mg, oral, Nightly    pantoprazole (PROTONIX) 40 mg, oral, 2 times daily, Do not crush, chew, or split.    polyethylene glycol (Glycolax, Miralax) 17 gram/dose powder Mix 17 g of powder and drink if needed.    tamsulosin (FLOMAX) 0.4 mg, oral, Daily    Vraylar 1.5 mg, Daily        PHYSICAL EXAM:  There were no vitals taken for this visit.  Constitutional: Patient appears well-developed and well-nourished. No distress.    Pulmonary/Chest: Effort normal. No respiratory distress.   Abdominal: Soft, ND NT  : Incision healing well, tips symmetrical and mid glans, pump midline and depenedent  Musculoskeletal: Normal range of motion.    Neurological: Alert and oriented to person, place, and time.  Psychiatric: Normal mood and affect. Behavior is normal. Thought content normal.      Labs  Lab Results   Component Value Date    TESTOSTERONE 295 03/16/2022     Lab Results   Component Value Date    PSA 0.11 05/13/2025     Lab Results   Component Value Date    GFRMALE 74 03/16/2023     Lab Results   Component Value Date    CREATININE 1.51 (H) 05/13/2025     Lab Results   Component Value Date    CHOL 187 04/30/2024     Lab Results   Component Value Date    HDL 28.6 04/30/2024     Lab Results   Component Value Date    CHHDL 6.5 04/30/2024     Lab Results   Component Value Date    LDLF 90 03/16/2022     Lab Results   Component Value Date    VLDL 39 04/30/2024     Lab Results   Component Value Date    TRIG 196 (H) 04/30/2024     Lab Results   Component Value Date    HCT 44.3 06/12/2024       Assessment:      1. Erectile dysfunction after radical prostatectomy        2. Malignant neoplasm of  prostate (Multi)        3. S/P insertion of penile implant          Plan:   Instructed on proper cycling  Minimal auto-inflation  No restrictions    Fu in 6m, sooner if needed

## 2025-06-05 DIAGNOSIS — C61 MALIGNANT NEOPLASM OF PROSTATE (MULTI): ICD-10-CM

## 2025-06-05 RX ORDER — TAMSULOSIN HYDROCHLORIDE 0.4 MG/1
CAPSULE ORAL
Qty: 90 CAPSULE | Refills: 11 | Status: SHIPPED | OUTPATIENT
Start: 2025-06-05

## 2025-06-08 DIAGNOSIS — K21.00 GASTROESOPHAGEAL REFLUX DISEASE WITH ESOPHAGITIS WITHOUT HEMORRHAGE: ICD-10-CM

## 2025-06-09 RX ORDER — PANTOPRAZOLE SODIUM 40 MG/1
40 TABLET, DELAYED RELEASE ORAL 2 TIMES DAILY
Qty: 180 TABLET | Refills: 1 | Status: SHIPPED | OUTPATIENT
Start: 2025-06-09 | End: 2025-12-06

## 2025-06-12 ENCOUNTER — CLINICAL SUPPORT (OUTPATIENT)
Dept: SLEEP MEDICINE | Facility: CLINIC | Age: 50
End: 2025-06-12
Payer: COMMERCIAL

## 2025-06-12 DIAGNOSIS — R06.81 WITNESSED EPISODE OF APNEA: ICD-10-CM

## 2025-06-12 NOTE — PROGRESS NOTES
Type of Study: HOME SLEEP STUDY - NOMAD     The patient received equipment and instructions for use of the [employer] Nomad HSAT device. The patient was instructed how to apply the effort belts, cannula, thermistor. It was also explained how the Nomad and oximeter components work.  The patient was asked to record their sleep for an 8-hour period.     The patient was informed of their responsibility for the device and acknowledged this by signing the HSAT device contract. The patient was asked to return the device on June 13, 2025 by 10:00 a.m. to the Respiratory Care Department at [facility].     The patient was instructed to call 911 as usual for any medical- emergencies while at home.  The patient was also given a phone number for troubleshooting when using the device in case there were additional questions.   good

## 2025-06-22 DIAGNOSIS — G47.33 OBSTRUCTIVE SLEEP APNEA SYNDROME: Primary | ICD-10-CM

## 2025-06-23 ENCOUNTER — TELEPHONE (OUTPATIENT)
Dept: PRIMARY CARE | Facility: CLINIC | Age: 50
End: 2025-06-23
Payer: COMMERCIAL

## 2025-06-23 NOTE — PROGRESS NOTES
Subjective   Patient ID: Shakila Shah is a 49 y.o. male who presents for No chief complaint on file..  HPI    Problem List[1]    Social Connections: Not on file       Medications Ordered Prior to Encounter[2]     There were no vitals filed for this visit.  There were no vitals filed for this visit.    Review of Systems    Objective     Physical Exam    Office Visit on 05/15/2025   Component Date Value Ref Range Status    Glucose 05/13/2025 120 (H)  74 - 99 mg/dL Final    Sodium 05/13/2025 142  136 - 145 mmol/L Final    Potassium 05/13/2025 4.0  3.5 - 5.3 mmol/L Final    Chloride 05/13/2025 105  98 - 107 mmol/L Final    Bicarbonate 05/13/2025 23  21 - 32 mmol/L Final    Anion Gap 05/13/2025 18  10 - 20 mmol/L Final    Urea Nitrogen 05/13/2025 12  6 - 23 mg/dL Final    Creatinine 05/13/2025 1.51 (H)  0.50 - 1.30 mg/dL Final    eGFR 05/13/2025 57 (L)  >60 mL/min/1.73m*2 Final    Calculations of estimated GFR are performed using the 2021 CKD-EPI Study Refit equation without the race variable for the IDMS-Traceable creatinine methods.  https://jasn.asnjournals.org/content/early/2021/09/22/ASN.1647169864    Calcium 05/13/2025 9.5  8.6 - 10.6 mg/dL Final    Albumin 05/13/2025 4.1  3.4 - 5.0 g/dL Final    Alkaline Phosphatase 05/13/2025 66  33 - 120 U/L Final    Total Protein 05/13/2025 7.0  6.4 - 8.2 g/dL Final    AST 05/13/2025 16  9 - 39 U/L Final    Bilirubin, Total 05/13/2025 0.6  0.0 - 1.2 mg/dL Final    ALT 05/13/2025 24  10 - 52 U/L Final    Patients treated with Sulfasalazine may generate falsely decreased results for ALT.   Lab on 05/14/2025   Component Date Value Ref Range Status    Prostate Specific AG 05/13/2025 0.11  <=4.00 ng/mL Final       Assessment/Plan          [1]   Patient Active Problem List  Diagnosis    Anxiety state    Erectile dysfunction of organic origin    Essential hypertension    Generalized osteoarthritis    Malignant neoplasm of prostate (Multi)    Moderate recurrent major depression     Diverticulosis of colon    Elevated PSA    Gastritis    Hiatal hernia    Internal hemorrhoids    Irritation of right eye    Lower urinary obstructive symptom    Nausea, vomiting, and diarrhea    Trimalleolar fracture of ankle, closed    Uveitis    Eyes swollen    SCC (squamous cell carcinoma)    Premature ejaculation    History of robot-assisted laparoscopic radical prostatectomy    Erectile dysfunction after radical prostatectomy    Cervical spondylosis    Lumbar spondylosis    Impairment of balance    S/P insertion of penile implant   [2]   Current Outpatient Medications on File Prior to Visit   Medication Sig Dispense Refill    acetaminophen (Tylenol) 500 mg tablet TAKE TWO TABLETS BY MOUTH EVERY 8 HOURS AS NEEDED FOR PAIN 180 tablet 11    amLODIPine (Norvasc) 10 mg tablet Take 1 tablet (10 mg) by mouth once daily. Take at 9 AM 90 tablet 1    buPROPion XL (Wellbutrin XL) 150 mg 24 hr tablet Take 1 tablet (150 mg) by mouth once daily in the morning.      buPROPion XL (Wellbutrin XL) 300 mg 24 hr tablet Take 1 tablet (300 mg) by mouth once daily. Take at 9 AM 90 tablet 1    celecoxib (CeleBREX) 100 mg capsule Take 1 capsule (100 mg) by mouth 2 times a day. 180 capsule 2    cetirizine (ZyrTEC) 10 mg tablet Take 1 tablet (10 mg) by mouth once daily. 30 tablet 0    dicyclomine (Bentyl) 10 mg capsule Take 1 capsule (10 mg) by mouth 3 times a day. 270 capsule 1    ergocalciferol (Vitamin D-2) 1.25 MG (68735 UT) capsule Take 1 capsule (50,000 Units) by mouth 1 (one) time per week.      fluticasone (Flonase) 50 mcg/actuation nasal spray Administer 1 spray into each nostril 2 times a day. Shake gently. Before first use, prime pump. After use, clean tip and replace cap. 16 g 0    gabapentin (Neurontin) 300 mg capsule Take 1 capsule (300 mg) by mouth 3 times a day. Take it night for 5 days, then increase to am and pm for 5 days and then three times a day 270 capsule 3    losartan-hydrochlorothiazide (Hyzaar) 100-25 mg tablet  Take 1 tablet by mouth once daily. Take at 9 AM 90 tablet 1    mirtazapine (Remeron) 15 mg tablet Take 1 tablet (15 mg) by mouth once daily in the evening.      miscellaneous medical supply (Blood Pressure Cuff) misc To be used daily 1 each 0    naltrexone (Depade) 50 mg tablet Take 1 tablet (50 mg) by mouth once daily.      nortriptyline (Pamelor) 50 mg capsule Take 1 capsule (50 mg) by mouth once daily at bedtime. 90 capsule 3    pantoprazole (ProtoNix) 40 mg EC tablet Take 1 tablet (40 mg) by mouth 2 times a day. Do not crush, chew, or split. 180 tablet 1    polyethylene glycol (Glycolax, Miralax) 17 gram/dose powder Mix 17 g of powder and drink if needed.      tamsulosin (Flomax) 0.4 mg 24 hr capsule TAKE ONE CAPSULE (0.4MG) BY MOUTH DAILY AT 9PM 90 capsule 11    Vraylar 1.5 mg capsule Take 1 capsule (1.5 mg) by mouth once daily.       No current facility-administered medications on file prior to visit.

## 2025-06-23 NOTE — TELEPHONE ENCOUNTER
----- Message from Jay Hastings sent at 6/22/2025 10:19 PM EDT -----  Pts HSAT showed possibly mild sleep apnea but wasn't conclusive.  His O2 levels were borderline at times.  Probably would be more accurate to have him see sleep medicine to decide if they should do   an in house test.  Placed order.  ----- Message -----  From: Tunde Aggarwal - Lab Results In  Sent: 6/19/2025   7:00 PM EDT  To: Jay Hastings MD

## 2025-07-07 ENCOUNTER — APPOINTMENT (OUTPATIENT)
Dept: PAIN MEDICINE | Facility: HOSPITAL | Age: 50
End: 2025-07-07
Payer: COMMERCIAL

## 2025-07-28 DIAGNOSIS — F41.1 ANXIETY STATE: ICD-10-CM

## 2025-07-28 RX ORDER — BUPROPION HYDROCHLORIDE 300 MG/1
TABLET ORAL
Qty: 90 TABLET | Refills: 1 | Status: SHIPPED | OUTPATIENT
Start: 2025-07-28

## 2025-08-06 DIAGNOSIS — K52.9 IBD (INFLAMMATORY BOWEL DISEASE): ICD-10-CM

## 2025-08-07 RX ORDER — DICYCLOMINE HYDROCHLORIDE 10 MG/1
10 CAPSULE ORAL 3 TIMES DAILY
Qty: 270 CAPSULE | Refills: 0 | Status: SHIPPED | OUTPATIENT
Start: 2025-08-07

## 2025-08-08 ENCOUNTER — TELEMEDICINE (OUTPATIENT)
Dept: SLEEP MEDICINE | Facility: CLINIC | Age: 50
End: 2025-08-08
Payer: COMMERCIAL

## 2025-08-08 DIAGNOSIS — G47.52 DREAM ENACTMENT BEHAVIOR: ICD-10-CM

## 2025-08-08 DIAGNOSIS — E55.9 VITAMIN D DEFICIENCY: ICD-10-CM

## 2025-08-08 DIAGNOSIS — G25.81 RLS (RESTLESS LEGS SYNDROME): ICD-10-CM

## 2025-08-08 DIAGNOSIS — G47.33 OSA (OBSTRUCTIVE SLEEP APNEA): Primary | ICD-10-CM

## 2025-08-08 DIAGNOSIS — E83.10 DISORDER OF IRON METABOLISM: ICD-10-CM

## 2025-08-08 ASSESSMENT — SLEEP AND FATIGUE QUESTIONNAIRES
HOW LIKELY ARE YOU TO NOD OFF OR FALL ASLEEP WHILE SITTING AND TALKING TO SOMEONE: HIGH CHANCE OF DOZING
SITING INACTIVE IN A PUBLIC PLACE LIKE A CLASS ROOM OR A MOVIE THEATER: SLIGHT CHANCE OF DOZING
HOW LIKELY ARE YOU TO NOD OFF OR FALL ASLEEP WHILE SITTING AND READING: HIGH CHANCE OF DOZING
SLEEP_PROBLEM_NOTICEABLE_TO_OTHERS: VERY MUCH NOTICEABLE
HOW LIKELY ARE YOU TO NOD OFF OR FALL ASLEEP IN A CAR, WHILE STOPPED FOR A FEW MINUTES IN TRAFFIC: WOULD NEVER DOZE
SLEEP_PROBLEM_INTERFERES_DAILY_ACTIVITIES: VERY MUCH NOTICEABLE
WORRIED_DISTRESSED_DUE_TO_SLEEP: SOMEWHAT
ESS-CHAD TOTAL SCORE: 19
DIFFICULTY_FALLING_ASLEEP: SEVERE
HOW LIKELY ARE YOU TO NOD OFF OR FALL ASLEEP WHILE LYING DOWN TO REST IN THE AFTERNOON WHEN CIRCUMSTANCES PERMIT: HIGH CHANCE OF DOZING
DIFFICULTY_STAYING_ASLEEP: VERY SEVERE
HOW LIKELY ARE YOU TO NOD OFF OR FALL ASLEEP WHILE SITTING QUIETLY AFTER LUNCH WITHOUT ALCOHOL: HIGH CHANCE OF DOZING
SATISFACTION_WITH_CURRENT_SLEEP_PATTERN: DISSATISFIED
HOW LIKELY ARE YOU TO NOD OFF OR FALL ASLEEP WHEN YOU ARE A PASSENGER IN A CAR FOR AN HOUR WITHOUT A BREAK: HIGH CHANCE OF DOZING
HOW LIKELY ARE YOU TO NOD OFF OR FALL ASLEEP WHILE WATCHING TV: HIGH CHANCE OF DOZING

## 2025-08-08 ASSESSMENT — COLUMBIA-SUICIDE SEVERITY RATING SCALE - C-SSRS
6. HAVE YOU EVER DONE ANYTHING, STARTED TO DO ANYTHING, OR PREPARED TO DO ANYTHING TO END YOUR LIFE?: NO
1. IN THE PAST MONTH, HAVE YOU WISHED YOU WERE DEAD OR WISHED YOU COULD GO TO SLEEP AND NOT WAKE UP?: NO
2. HAVE YOU ACTUALLY HAD ANY THOUGHTS OF KILLING YOURSELF?: NO

## 2025-08-08 ASSESSMENT — PAIN SCALES - GENERAL: PAINLEVEL_OUTOF10: 7

## 2025-08-08 ASSESSMENT — ENCOUNTER SYMPTOMS: DEPRESSION: 0

## 2025-08-08 NOTE — PROGRESS NOTES
[facility]  NEW CONSULT VISIT NOTE    Virtual or Telephone Consent  An interactive audio and video telecommunication system which permits real time communications between the patient (at the originating site) and provider (at the distant site) was utilized to provide this telehealth service.   The patient was informed about the telehealth clinical encounter including benefits to avoiding travel, limitations of the assessment, and billing for the service. In-office care may be recommended if needed. Telehealth sessions are not being recorded and personal health information is protected. All questions were answered and verbal consent from the patient (or guardian) was obtained to proceed.     Referred by: Jay Hastings MD    HISTORY OF PRESENT ILLNESS     Patient ID: Shakila Shah is a 49 y.o. male who presents to [facility] for a comprehensive sleep medicine evaluation with concerns of sleep apnea recently diagnosed, Restless Legs Syndrome (RLS), and REM sleep Behavior Disorder (RBD).    Patient is here alone today.  To review, patient's medical history is notable for obesity (BMI 30), HTN, seasonal allergies, hiatal hernia, prostate cancer, anxiety, depression, dream enactment behavior, RLS< and PAMELA       Patient had HSAT in June 2025 which showed PAMELA but no CPAP started yet. Patient reports PAMELA symptoms such as loud snoring, witnessed apnea, waking up gasping/choking for air, waking up unrefreshed with morning headache, excessive daytime sleepiness/ fatigue, and issues with memory, concentration, and mood. Also, he has been acting out his dreams 3-4 times per week. He hit the wall twice, hit his bed partner once, and hit the pillow twice.     SLEEP STUDY HISTORY (personally reviewed raw data such as interpretation report, data sheet, hypnogram, and titration table if available and applicable)  HSAT 6/12/2025: REI3% 9.6 (supine REI3% 6.5,  non-supine REI3% 17.1), REI4% 3.9 (supine REI4% 2.7, non-supine REI4%6.9), SpO2 tristen 90%    SLEEP-WAKE SCHEDULE  Bedtime: 10 PM daily  Subjective sleep latency: 2-3 hours  Difficulty falling asleep: yes due to mind-racing / rumination / worry  Number of awakenings: once per night for unknown reason after sleeping 2-3 hours and cannot fall back asleep  Final wake time: 2-3 AM daily  Out of bed time: 6 AM daily  Shift work: No  Naps: once daily for 2-3 hours  Feels rested after a nap: No  Average sleep duration (excluding naps): 2-3 hours     SLEEP ENVIRONMENT  Sleep location: bed or recliner  Sleep status: sleeps with wife  Preferred sleep position: back, stomach, and side  TV in bedroom: Yes  Room is dark: Yes  Room is quiet: Yes  Room is cool: Yes    SLEEP HABITS  Activities before bedtime: watch TV  Activities in bed: none  Clock-watching: No  Smokin/2 pack daily for 10 years, now trying to quit  ETOH: quit in   Marijuana: yes on recreational marijuana for back pain and insomnia. He smokes marijuana once every 3 weeks.  Caffeine: 1/2 cup decaf coffee daily. Used to drink 2-3 cups coffee daily  Sleep aids: yes on mirtazapine 45 mg and nortriptyline 50 mg    SLEEP ROS  Night symptoms: POSITIVE for snoring, witnessed apnea, wake up gasping and/or choking for air, nasal congestion , mouth breathing, night sweats during sleep, heartburn or sour taste in mouth at night, and nocturnal cough and NEGATIVE for waking up with racing heart and nocturia  Morning symptoms: POSITIVE for unrefreshing sleep, morning headache, and morning dry mouth and NEGATIVE for morning sore throat  Daytime symptoms POSITIVE for excessive daytime sleepiness, fatigue, trouble remembering things in daytime, trouble staying focused in daytime, and irritability in daytime. Patient is not driving now; hence, he does not know   Hypersomnia / narcolepsy symptoms: POSITIVE for hypnagogic hallucinations and NEGATIVE for sleep paralysis,  hypnopompic hallucinations, and cataplexy  Parasomnia symptoms: POSITIVE for sleep talking, acting out dreams, and nightmares and NEGATIVE for sleep walking, sleep eating, and falling from bed   Movements in sleep: POSITIVE for frequent leg kicks / jerks at night while asleep and sleep-related bruxism and NEGATIVE for seizures during sleep    RLS screen: Patient admits having urge to move legs that occurs at rest (sitting, getting into bed, or lying n bed), worse in the evening, and relieved temporarily with movement.   Frequency of RLS symptoms: every night  RLS symptoms occurring in daytime: Yes   RLS symptoms progressing to arms: Yes   RLS symptoms affect sleep onset: Yes   RLS symptoms wakes patient up from sleep: Yes   Current or past treatment for RLS: no  Precluding events of RLS symptoms: donated blood in the past, now on nortriptyline and mirtazapine    WEIGHT: fluctuating    Claustrophobia: Yes    REVIEW OF SYSTEMS     All other systems have been reviewed and are negative.    ALLERGIES     Allergies[1]    MEDICATIONS     Current Medications[2]    PAST HISTORIES     PERTINENT PAST MEDICAL HISTORY: See HPI    PERTINENT PAST SURGICAL HISTORY for Sleep Medicine:  non-contributory    PERTINENT FAMILY HISTORY for Sleep Medicine:  Patient denies family history of sleep apnea.    PERTINENT SOCIAL HISTORY:  He  reports that he has been smoking cigarettes. He started smoking about 29 years ago. He has a 29.6 pack-year smoking history. He has never used smokeless tobacco. He reports that he does not currently use alcohol. He reports that he does not currently use drugs after having used the following drugs: Marijuana. He currently lives with spouse and unemployed    Active Problems, Allergy List, Medication List, and PMH/PSH/FH/Social Hx have been reviewed and reconciled in chart. No significant changes unless documented in the pertinent chart section. Updates made when necessary.     PHYSICAL EXAM     VITAL SIGNS:  "There were no vitals taken for this visit.    NECK CIRCUMFERENCE: not measured    ESS: 19  CECE: 20    RESULTS/DATA     No results found for: \"IRON\", \"TRANSFERRIN\", \"IRONSAT\", \"TIBC\", \"FERRITIN\"    Bicarbonate   Date Value Ref Range Status   05/13/2025 23 21 - 32 mmol/L Final         ASSESSMENT/PLAN     Shakila Shah is a 49 y.o. male who is seen today in [facility] for the following problems:.     OBSTRUCTIVE SLEEP APNEA, MILD (HSAT REI3% 9.6, REI4% 3.9)  - Personally reviewed the sleep study's raw data such as interpretation report, data sheet, and hypnogram. A copy of recent testing was either given to patient or released in [employer]. See HPI for detailed summary of sleep study results.  - Discussed sleep study results and treatment options with patient.  - Recommend starting auto-CPAP 6-16 cm H2O with EPR 3, heated humidification, heated tubings, and mask fitting session. Orders sent to [employer].  - Discussed 30-day mask guarantee and insurance requirement regarding PAP compliance and follow-up.   - Advised about cleaning instructions and replacement schedule of PAP accessories.  - Discussed sleep apnea in detail to include pathophysiology, risk factors, diagnostic options, treatment options, and cardiovascular / neurologic consequences of untreated PAMELA.   - Supportive management as follows: Lose weight. Stay off your back when sleeping. Avoid smoking, alcohol, and sedating medications if applicable. Don't drive when sleepy.    RESTLESS LEG SYNDROME  - Check ferritin and TSAT. Replace if ferritin<75.  - If above tests are normal, start medications (gabapentin, Pregabalin, Horizant, Ropinirole).   - Supportive management: Avoid triggers of RLS such as caffeine, alcohol, nicotine, medications, and low iron. Taper off or reduce dose of medications that can cause RLS such as but not limited to the following: antidepressants except Buproprion and Trazodone, " antihistamines, antipsychotics, anti-emetics except ondansetron, melatonin, coQ10, topiramate, some antihypertensives, some antibiotics, albuterol, lipitor, levothyroxine etc. Consider switch antidepressants to Bupropion if feasible. May take warm bath 2 hours before bedtime. Massage and/or stretch legs while in bed    DREAM ENACTMENT BEHAVIOR  - Recommend safety precautions of sleep environment  - likely due to pseudo-RBD from untreated PAMELA    SEASONAL ALLERGIES  - Continue fluticasone nasal spray 2 sprays per nostril once daily 1 hour before bedtime.  - If there is inadequate or lack of improvement on the above intranasal steroid spray, consider adding Azelastine nasal spray, 2 sprays per nostril once daily in the morning.   - Continue cetirizine 10 mg once daily.   - Educated patient on proper use of intranasal sprays.     OBESITY  - Recommend weight loss with diet and exercise.  - Weight loss can help in long term management of PAMELA.  - Defer management to PCP.    HYPERTENSION  - Untreated  or inadequately treated sleep apnea can lead to new onset hypertension, resistant hypertension, or refractory hypertension.  - Continue anti-hypertensive medications per PCP.  - Supportive management: low salt DASH diet (less than 2000 mg sodium intake daily), moderate intensity aerobic exercise at least 30 minutes 5 days per week, reduce stress, quit smoking, limit alcohol, lose weight, and monitor BP once daily  - PCP following. Defer management to PCP.      Follow-up in 31-90 days after getting new machine.    All of patient's questions were answered. He verbalizes understanding and agreement with my assessment and plan. Refer to after-visit-summary (AVS) for patient education and more details on sleep study preparation, troubleshooting issues with PAP usage, proper cleaning instructions of PAP supplies, and usual recommended replacement schedule for each of the PAP supplies.          [1] No Known Allergies  [2]   Current  Outpatient Medications   Medication Sig Dispense Refill    acetaminophen (Tylenol) 500 mg tablet TAKE TWO TABLETS BY MOUTH EVERY 8 HOURS AS NEEDED FOR PAIN 180 tablet 11    amLODIPine (Norvasc) 10 mg tablet Take 1 tablet (10 mg) by mouth once daily. Take at 9 AM 90 tablet 1    buPROPion XL (Wellbutrin XL) 150 mg 24 hr tablet Take 1 tablet (150 mg) by mouth once daily in the morning.      buPROPion XL (Wellbutrin XL) 300 mg 24 hr tablet TAKE 1 TABLET (300 mg) by mouth once daily at 9AM 90 tablet 1    celecoxib (CeleBREX) 100 mg capsule Take 1 capsule (100 mg) by mouth 2 times a day. 180 capsule 2    cetirizine (ZyrTEC) 10 mg tablet Take 1 tablet (10 mg) by mouth once daily. 30 tablet 0    dicyclomine (Bentyl) 10 mg capsule Take 1 capsule (10 mg) by mouth 3 times a day. 270 capsule 0    ergocalciferol (Vitamin D-2) 1.25 MG (94993 UT) capsule Take 1 capsule (50,000 Units) by mouth 1 (one) time per week.      fluticasone (Flonase) 50 mcg/actuation nasal spray Administer 1 spray into each nostril 2 times a day. Shake gently. Before first use, prime pump. After use, clean tip and replace cap. 16 g 0    gabapentin (Neurontin) 300 mg capsule Take 1 capsule (300 mg) by mouth 3 times a day. Take it night for 5 days, then increase to am and pm for 5 days and then three times a day 270 capsule 3    losartan-hydrochlorothiazide (Hyzaar) 100-25 mg tablet Take 1 tablet by mouth once daily. Take at 9 AM 90 tablet 1    mirtazapine (Remeron) 15 mg tablet Take 1 tablet (15 mg) by mouth once daily in the evening.      miscellaneous medical supply (Blood Pressure Cuff) misc To be used daily 1 each 0    naltrexone (Depade) 50 mg tablet Take 1 tablet (50 mg) by mouth once daily.      nortriptyline (Pamelor) 50 mg capsule Take 1 capsule (50 mg) by mouth once daily at bedtime. 90 capsule 3    pantoprazole (ProtoNix) 40 mg EC tablet Take 1 tablet (40 mg) by mouth 2 times a day. Do not crush, chew, or split. 180 tablet 1    polyethylene  glycol (Glycolax, Miralax) 17 gram/dose powder Mix 17 g of powder and drink if needed.      tamsulosin (Flomax) 0.4 mg 24 hr capsule TAKE ONE CAPSULE (0.4MG) BY MOUTH DAILY AT 9PM 90 capsule 11    Vraylar 1.5 mg capsule Take 1 capsule (1.5 mg) by mouth once daily.       No current facility-administered medications for this visit.

## 2025-08-08 NOTE — PATIENT INSTRUCTIONS
"Below is a summary of our contact numbers, your treatment plan, patient education, important information, and sleep testing locations.    If you need to schedule, cancel, and reschedule appointment for sleep study, please call [phone] ([phone]), option 3. For canceling and rescheduling, kindly call 48 hours in advance.  If you need to schedule, cancel, and reschedule follow-up appointment with me, please call [phone] ([phone]), option 2. You may also call [phone] (direct line for [facility]) or [phone] (direct line for [facility]). Alternatively, you can schedule, cancel, and reschedule clinic appointments in \"[email or web address]\"  If you have a medical question about your sleep issues and/or need medication refills, please call [phone] (direct line for [facility]). Alternatively, you can also send a message directly to your provider through \"My Chart\" ([email or web address])  If you need general assistance (e.g. forms completed, general questions, or additional help scheduling, please call [phone] (direct line for [facility]) or  [phone] (direct line for [facility])    If a referral for a sleep study, CPAP machine, or another specialist was made, and you have not heard about scheduling within a week, please call [phone] (direct line for [facility]) or  [phone] (direct line for [facility])    If you are on CPAP, please bring your device's SD card and/or the device to each clinic appointment.   In case of problems with PAP machine or mask interface, please contact your DME (Durable Medical Equipment) company first for troubleshooting. [employer] is the company who provides you the machine and/or PAP supplies. Under Important Phone Numbers below, see the list of [employer] companies and their contact numbers.     TREATMENT PLAN     - Do the following testing: iron studies (need at least 8 hours fasting) and vitamin D  - Start auto-CPAP " thru [employer].    Follow-up Appointment:   Follow-up in 31-90 days after getting new machine.    PATIENT EDUCATION     OBSTRUCTIVE SLEEP APNEA (PAMELA) is a sleep disorder where your upper airway muscles relax during sleep and the airway intermittently and repetitively narrows and collapses leading to partially blocked airway (hypopnea) or completely blocked airway (apnea) which, in turn, can disrupt breathing in sleep, lower oxygen levels while you sleep and cause night time wakings. Because both apnea and hypopnea may cause higher carbon dioxide or low oxygen levels, untreated PAMELA can lead to heart arrhythmia, elevation of blood pressure, and make it harder for the body to consolidate memory and facilitate metabolism (leading to higher blood sugars at night). Frequent partial arousals occur during sleep resulting in sleep deprivation and daytime sleepiness. PAMELA is associated with an increased risk of cardiovascular disease, stroke, hypertension, and insulin resistance. Moreover, untreated PAMELA with excessive daytime sleepiness can increase the risk of motor vehicular accidents.    Below are conservative strategies for PAMELA regardless of PAMELA severity are:   Positional therapy - Avoid sleeping on your back.   Healthy diet and regular exercise to optimize weight is highly encouraged.   Avoid alcohol late in the evening and sedative-hypnotics as these substances can make sleep apnea worse.   Improve breathing through the nose with intranasal steroid spray, saline rinse, or antihistamines    Safety: Avoid driving vehicle and operating heavy equipment while sleepy. Drowsy driving may lead to life-threatening motor vehicle accidents. A person driving while sleepy is 5 times more likely to have an accident. If you feel sleepy, pull over and take a short power nap (sleep for less than 30 minutes). Otherwise, ask somebody to drive you.    Treatment options for sleep apnea include weight management, positional  therapy, Positive Airway Therapy (PAP) therapy, oral appliance therapy, hypoglossal nerve stimulator (Inspire) and select airway surgeries.    Starting Positive Airway Pressure (PAP): You were ordered a device to wear when you sleep called PAP (Positive Airway Pressure) to treat your sleep apnea. The order will be submitted to a durable medical equipment (DME) company who will arrange setting you up with the device. They will provide all the necessary equipment and discuss use and maintenance of the device with you as well as mask fitting and process of replacing / renewing PAP supplies or accessories. Once you get the machine, please start using it immediately. You may not be successful right away and that is okay. Darrian be certain that you keep trying nightly and reach out to DME if you are struggling or having issues with machine usage.     *Please follow-up with me in 1-2 months of starting CPAP to see how well it is working for you and to do some troubleshooting if needed. Also, please bring all PAP equipment with you to follow up appointments unless told otherwise.     Important things to keep in mind as you start PAP:  Insurance will monitor your usage during the first 90 days. You should use your PAP - all night, every night, and including all naps (especially if naps are more than 30 minutes) for your health. The bare minimum is to use your PAP device while sleeping for at least 4 hours per day at least 5-6 days per week.. Otherwise, your PAP device will be reclaimed by your DME company at 90 days.  There are many masks to choose from to wear with your PAP machine. If you are not comfortable with the first mask issued to you, call your DME company and ask for another option to try. You typically have a 30-day mask guarantee from the day you received your machine.   Discuss with your provider if you are having issues breathing with the machine or if the temperature or humidity feel uncomfortable.  Expect to  have an adjustment period when you start your device. It helps to continuing wearing the machine every day for a period of time until you get more used to it. You can practice with wearing the mask alone if you need, then add in the PAP air pressure a few days later.   Reach out for help if you are struggling! The sleep medicine department can be reached at [phone])  We encourage you to download data monitoring apps to your phone. For PerSay 10/11 - MyAir milton. For [employer]attva - DreamMapper. Both apps are available in the [place] store for free and are a great tool to monitor your progress with your PAP device night to night.    Tips for success with PAP machine usage:  Comfortable and well-fitting mask  Appropriate pressure on the machine  Using humidification  Support from bed partner and clinical team      Maintaining your CPAP/BPAP device:    The humidification chamber (aka water tank or water chamber) needs to be filled with distilled water to prevent buildup of white deposits in the future. If you cannot find distilled water, you can use tap water but expect to have white deposits buildup seen after prolonged use with tap water. If you start seeing white deposits on the water chamber, you can clean it by filling it with equal parts of distilled white vinegar and water. Let the vinegar-water mixture sit for 2 hours, and then rinse it with running tap water. Clean with soap and water then let it dry.     You should try to keep your machine clean in order to work well. Here are some tips to clean PAP supplies / accessories:    Clean the humidification chamber (aka water tank) as well as your mask and tubing at least once a week with soap and water.   Alternatively, you can fill a sink or basin with warm water and add a little mild detergent, like Ivory dish soap. Gently wipe your supplies with the soapy water to free all the oils and dirt that may have collected. Once that's done, rinse  these items with clean water until the soap is gone and let them air dry. You can hang your tubing over the curtain kylee in your bathroom so that it dries.  The mask insert (part of the mask that has contact with your skin) needs to be cleaned with soap and water daily. Another option is to wipe them down with CPAP wipes or baby wipes.    You should replace your mask and tubing frequently in order to prevent bacteria buildup, machine damage, and mask seal issues. The older the mask and hose, the high likelihood that there is bacteria buildup in it especially if they are not cleaned regularly. Dirty filters damage machines because build-up of dust and contaminants can cause machine to over-heat, and in time, damage the motor of machine. Cushions lose their seal over time as most masks are made of plastic and silicone while headgear is made of neoprene. These materials will break down with age and frequent use. Here is the recommended replacement schedule for PAP supplies / accessories:    Twice a month- disposable filters and cushions for nasal mask or nasal pillows.  Once a month- cushion for full face mask  Every 3 months- mask with headgear and PAP tubing (standard or heated hose)  Every 6 months- reusable filter, water chamber, and chin strap     Other useful information:    Some people do not put water in the tank while other people prefers to put water in the tank to prevent mouth dryness. Try to experiment to determine which is more comfortable for you.   In general, new machines have 2 years warranty on parts while health insurance allows you to have a new machine once every 5 years.     Common issues with PAP machine:    Mask gets dislodged when turning to the side: Consider getting a CPAP pillow or switching to a mask with hose on top.     Dry mouth:  Your machine has built-in humidifier that heats up the air to prevent dry mouth. It can be adjusted to your comfort. You can try that first and increase  "setting one level one night at a time to check which setting is comfortable and effective in lessening dry mouth. In some patients with heated hose, adjusting tube temperature to make air warmer can improve dry mouth. If dry mouth persists despite adjusting humidity or tube temperature setting, may apply OTC Biotene gel over the gums at bedtime.  If Biotene gel is not effective, consider trying XEROSTOM gel from [employer].  Also, eliminate or reduce dose of medications that can cause dry mouth if possible. Lastly, may try getting a separate room humidifier machine.    Airleaks: Please call DME as they may need to adjust your mask or refit you with a different kind or different size of mask. In addition, you can ask DME for tips on getting a good mask seal and mask fit.     Difficulty tolerating the mask: Contact your DME to try a different kind of mask and/or call office to get a referral to Sleep Psychologist for CPAP desensitization. CPAP desensitization technique is a set of strategies that helps patient cope with claustrophobia and anxiety related to wearing mask. Alternatively, we can do a daytime mini-sleep study called PAP-nap trial wherein you will try on different kinds of mask and the sleep technician will try different pressure settings on CPAP and BPAP machines to see which specific pressure is tolerable and comfortable for you.     Water droplets or moisture within the hose and/or mask: This is called rain-out and it is caused by condensation of too much heated humidity on the cooler walls of the hose. If you have rain-out, turn down humidity settings or get a heated hose. If you already have a heated hose, turn up the \"tube temperature\" of the heated hose. Alternatively, if you don't want to get a heated hose or warmer air, may wrap the CPAP hose with stockings to keep it somewhat warm. Also, you need to place the machine on the floor and lower the hose so that water won't travel upward towards your " mask.     PAP desensitization techniques: If you have concerns about something being on your face at night, you can start by getting used to it before trying to sleep with it as follows:      Sit in a comfortable chair or bed. Connect the mask and hose to the CPAP/BPAP machine. Hold the mask on your face (without straps on) and turn on the machine. Practice breathing with the mask on while awake sitting and watching television, reading, or performing a sedentary activity during the day for 5-10 minutes and then take it off.  If tolerated, try again and gradually build up to longer periods of time. If not tolerated, try and try again until it is more comfortable as you become more desensitized. If you are able to use it for at least 20-30 minutes, move unto the next step.     Sit in a comfortable chair or bed. Connect the mask and hose to the CPAP/BPAP machine. Strap the mask on your head and turn on the machine. Practice breathing with the mask and headgear on while awake sitting and watching television, reading, or performing a sedentary activity. Start with 5-10 minutes and gradually increasing time until you can wear it comfortably for at least 20-30 minutes, then move to the next step.    Take a shorter daytime nap with machine turned on while you are in a reclined position in bed, sofa, or recliner. Start with 5-10 minute nap and gradually increase up to 30 minutes. It is not important whether you fall asleep or not. The goal is to rest comfortably with PAP machine on.     Reintroduce PAP machine into nighttime sleep. You can begin using it a portion of the night and gradually increase up to entire night.     Proceed from one step to the next only when you are completely comfortable. If you feel any anxiety or discomfort, return to the previous step, then proceed again when comfortable.    Expect to “work” with your CPAP/BIPAP unit. It is important to try to relax when beginning CPAP/BIPAP therapy. Inhalation  and exhalation should occur through the nose only. If you are unable to consistently breathe this way, do not panic or lose hope. There are other types of masks which allow you to breathe through your nose and/or your mouth. Also, in some patients, using intranasal steroid spray (e.g. Flonase or Nasocort or Fluticasone) 1 hour before bedtime and/or before putting on CPAP mask can help tolerate breathing through the mask.    Don't give up after a few attempts--some patients adjust quickly, while some patients need 3-4 weeks (or sometimes even longer) to be accustomed to CPAP therapy.  Contact your sleep medicine specialist if you have a significant change in weight since this may affect your pressure.    How to use nasal sprays properly: If you have nasal congestion or allergies, improving your breathing through the nose is critical for treating sleep apnea and improving your sleep. Hence, intranasal steroid spray like Flonase or Nasocort (generic name is Fluticasone) might help. In some patients with sleep apnea, using intranasal steroid spray allowed them to tolerate CPAP mask better.     Intranasal steroids are usually prescribed as 2 sprays per nostril 1 hour before bedtime. If you administer intranasal steroids too close to bedtime, it might not work as well.  If you have nasal congestion or allergies during day despite using Flonase at night, try adding Azelastine nasal spray 2 sprays per nostril in the morning. Alternatively, can consider adding over-the-counter non-sedating antihistamine medications.     However, if you have severe nasal congestion or allergies despite using both nasal sprays, consider seeing an allergy specialist to confirm which substance you are sensitive to and get definitive treatment which may be in the form of injections, oral pills, different kind of nose sprays, and/or a combination of everything said.     Below are instructions on how to use intranasal steroid spray properly:  Sit  up or stand up with your face down.  Shake the spray bottle and insert its tip in one nostril.  Point the spray tip towards your ear, and not towards the middle of your nose. Pointing the tip upward and in the middle of the nose can lead to discomfort and irritation of nasal mucosa which can lead to nose bleeding or coughing up tinges of blood.  Squeeze the spray bottle and administer the recommended amount of spray.   Don't sniff when you spray. Instead, remove the spray bottle and pinch your nose for 10 seconds.   Perform steps 1-6 on the other nostril.      Restless Legs Syndrome (RLS) is a clinical diagnosis wherein you have the urge to move your legs while sitting or lying down, occurring usually at night or worse at night, and is partially or completely relieved with movement (massage, stretching, walking etc). RLS is usually related with low iron in the brain and gets worse with caffeine, nicotine, alcohol, and certain medications (antidepressants, antipsychotics, sedating antihistamines, metoclopromide, melatonin, coQ10, topiramate, some antihypertensives, some antibiotics, albuterol, lipitor, levothyroxine)    General care for RLS:  If you are taking medications that triggers or worsens RLS, consider consulting with sleep specialist and/or prescribing doctor about tapering off these medications or switching to a different class of medications if feasible.  Avoid nicotine, alcohol, and caffeine containing substances, such as chocolate and sodas, as these can make symptoms worse.   Try exercise (ie walking) and warm baths 2 hours before bedtime. May also try stretching the legs against the wall prior going to bed, massaging legs while in bed, or doing leg compression.   If RLS symptoms occur 2 to 3 times per week, consider checking lab work to see if certain vitamins or minerals are depleted (i.e. iron levels) and/or start medication for RLS (i.e. ropinirole or pramipexole vs gabapentin).   If you need to  take iron pill for RLS:  Take you iron pill on empty stomach either 30-60 minutes before meal or 2 hours after meal.   Take your iron pill with 100-200 mg vitamin C or 1 glass  of orange juice for better absorption.   Do not take your iron pill with milk or dairy products nor with levothyroxine. As much as possible, maintain 2-3 hours difference between milk or levothyroxine intake and iron pill intake.   If iron pill not tolerated, consider iron infusion.    Parasomnia is a term for abnormal behaviors performed during sleep which includes sleep-walking, sleep-eating, and acting out dreams.     General guidelines for parasomnia include:  Remove potentially dangerous objects, such as guns or sharp objects, from the bedroom. Weapons should be stored and locked away safely outside the bedroom with the key entrusted to another person.  Make sure you and your bed partner are safe. Put pillows in between you and your bed partner if you have episodes of kicking or hitting while sleeping.  Alternatively, consider sleeping in separate beds or separate bedrooms from bed partner until symptoms are controlled.  Put up bed railings on each side to prevent falling from bed. Alternatively, move your mattress on the floor to sleep or sleep in a low-lying mattress.   Place padding or cushion on the corners of furniture and around the bed.  Remove clutter and furniture from bedroom floor to avoid injuring yourself.  Lock bedroom doors and windows.  Hide your car keys.  In case of sleepwalking episodes, advise your bed partner to calmly lead you back to bed with a gentle voice. Avoid touching and grabbing.  If you find evidence of sleep eating, especially if you are gaining weight, consider locking your fridge and pantry at night.  If clinically feasible, taper off medications that can trigger or worsen RBD.     You can also go to the following EDUCATION WEBSITES for further information:   American Academy of Sleep Medicine  http://sleepeducation.org  National Sleep Foundation: https://sleepfoundation.org  American Sleep Apnea Association: https://www.sleepapnea.org (for patients with sleep apnea)  Narcolepsy Network: https://www.narcolepsynetwork.org (for patients with narcolepsy)  [employer]: https://www.Pwnie Expresscolepsy.org (for patients with narcolepsy)  Hypersomnia Foundation: https://www.hypersomniafoundation.org (for patients with idiopathic hypersomnia)  RLS foundation: https://www.rls.org (for patients with restless leg syndrome)    IMPORTANT INFORMATION     Call 911 for medical emergencies.  Our offices are generally open from Monday-Friday, 8 am - 5 pm.   There are no supporting services by either the sleep doctors or their staff on weekends and Holidays, or after 5 PM on weekdays.   If you need to get in touch with me, you may call the direct line for Adult Sleep Nurses or the direct line of any of my 2 offices ([place] or [facility]). Alternatively, you can also use [facility].    PRESCRIPTIONS     We require 7 days advanced notice for prescription refills. If we do not receive the request in this time, we cannot guarantee that your medication will be refilled in time.    IMPORTANT PHONE NUMBERS     Sleep Medicine Clinic Fax: [phone]  Appointments (for Pediatric Sleep Clinic): [phone] - option 1  Appointments (for Adult Sleep Clinic): [phone] - option 2  Appointments (For Sleep Studies): [phone] - option 3  Behavioral Sleep Medicine: [phone]  Sleep Surgery: [phone] (Dr. Montiel or Dr. Bustos)  Myofunctional Therapy (ENT): [phone] for [facility]; [phone] for [place]; [phone] for [facility] [place]; [phone] for [place]; [phone] for [place]; [phone] option 1 for [place]  Nutrition Service: [phone]  Weight management clinic with endocrinology: [phone] or [phone] (Dr. Treadwell)  Bariatric Surgery: [phone]   [facility]: [phone] (offers holistic approaches to weight management)  ENT (Otolaryngology): [phone]  Neurology: [phone]  Psychiatry: [phone]  Pulmonary Function Testing (PFT) Center: [phone]  Pulmonary Medicine: [phone]  [facility] (DME): [phone]  [facility] (DME): [phone] or [phone] (formerly known as [facility])  [facility] (DME): [phone]  [facility] (DME): [phone]  [facility] (DME): [phone] ([place]), [phone] ([place])    OUR SLEEP TESTING LOCATIONS     Our team will contact you to schedule your sleep study, however, you can contact us as follows:  Main Phone Line (scheduling only): [phone] ([phone]), option 3  Adult and Pediatric Locations  [facility] (6 years and older): [facility] - 4th floor ([facility]) After hours line: [phone]  [facility] (Main campus: All ages): [facility], 6th floor. After hours line: [phone]   [place] (4-5 years and older; younger considered on case-by-case basis): [address]; [address], [address]. Scheduling  After hours line: [phone]   [place] (6 years and older): [address]; [address]; [address]   [place] (6 years and older): [address] [facility], [address]  After hours line: [phone]   [place] (6 years and older) in [place]: [address], 2nd floor  After hours line: [phone]   [place] (13 years and older): [address]  After hours line: [phone]    [place] (6 years and older): [address] [facility], 2nd floor   [place] (13 years and older): [address] [facility]; 4th floor  After hours line: [phone]  [facility] (6 years and older) at [place]: [address] [facility]  After hours line: [phone]     Once again,  thank you for coming to the [facility] Medicine Clinic today.  Here at [facility], we wish you a restful sleep!

## 2025-08-11 ENCOUNTER — APPOINTMENT (OUTPATIENT)
Dept: PAIN MEDICINE | Facility: HOSPITAL | Age: 50
End: 2025-08-11
Payer: COMMERCIAL

## 2025-08-11 VITALS
OXYGEN SATURATION: 96 % | HEIGHT: 71 IN | SYSTOLIC BLOOD PRESSURE: 117 MMHG | WEIGHT: 205 LBS | RESPIRATION RATE: 20 BRPM | BODY MASS INDEX: 28.7 KG/M2 | HEART RATE: 110 BPM | DIASTOLIC BLOOD PRESSURE: 75 MMHG

## 2025-08-11 DIAGNOSIS — M54.12 CERVICAL NEURITIS: ICD-10-CM

## 2025-08-11 DIAGNOSIS — M54.16 LUMBAR NEURITIS: Primary | ICD-10-CM

## 2025-08-11 DIAGNOSIS — M47.812 CERVICAL SPONDYLOSIS: ICD-10-CM

## 2025-08-11 DIAGNOSIS — M47.816 LUMBAR SPONDYLOSIS: ICD-10-CM

## 2025-08-11 PROCEDURE — 3008F BODY MASS INDEX DOCD: CPT | Performed by: PHYSICAL MEDICINE & REHABILITATION

## 2025-08-11 PROCEDURE — 3078F DIAST BP <80 MM HG: CPT | Performed by: PHYSICAL MEDICINE & REHABILITATION

## 2025-08-11 PROCEDURE — 99214 OFFICE O/P EST MOD 30 MIN: CPT | Performed by: PHYSICAL MEDICINE & REHABILITATION

## 2025-08-11 PROCEDURE — 3074F SYST BP LT 130 MM HG: CPT | Performed by: PHYSICAL MEDICINE & REHABILITATION

## 2025-08-11 PROCEDURE — 99213 OFFICE O/P EST LOW 20 MIN: CPT

## 2025-08-11 RX ORDER — TOPIRAMATE 25 MG/1
25 TABLET, FILM COATED ORAL NIGHTLY
Qty: 30 TABLET | Refills: 2 | Status: SHIPPED | OUTPATIENT
Start: 2025-08-11 | End: 2025-11-09

## 2025-08-11 ASSESSMENT — COLUMBIA-SUICIDE SEVERITY RATING SCALE - C-SSRS
6. HAVE YOU EVER DONE ANYTHING, STARTED TO DO ANYTHING, OR PREPARED TO DO ANYTHING TO END YOUR LIFE?: NO
2. HAVE YOU ACTUALLY HAD ANY THOUGHTS OF KILLING YOURSELF?: NO
1. IN THE PAST MONTH, HAVE YOU WISHED YOU WERE DEAD OR WISHED YOU COULD GO TO SLEEP AND NOT WAKE UP?: NO

## 2025-08-11 ASSESSMENT — ENCOUNTER SYMPTOMS
LOSS OF SENSATION IN FEET: 0
DEPRESSION: 0
OCCASIONAL FEELINGS OF UNSTEADINESS: 0

## 2025-08-11 ASSESSMENT — PATIENT HEALTH QUESTIONNAIRE - PHQ9
1. LITTLE INTEREST OR PLEASURE IN DOING THINGS: SEVERAL DAYS
2. FEELING DOWN, DEPRESSED OR HOPELESS: SEVERAL DAYS
SUM OF ALL RESPONSES TO PHQ9 QUESTIONS 1 AND 2: 2

## 2025-08-11 ASSESSMENT — PAIN SCALES - GENERAL: PAINLEVEL_OUTOF10: 8

## 2025-08-12 LAB
25(OH)D3+25(OH)D2 SERPL-MCNC: 13 NG/ML (ref 30–100)
FERRITIN SERPL-MCNC: 32 NG/ML (ref 38–380)
IRON SATN MFR SERPL: 16 % (CALC) (ref 20–48)
IRON SERPL-MCNC: 49 MCG/DL (ref 50–180)
TIBC SERPL-MCNC: 312 MCG/DL (CALC) (ref 250–425)

## 2025-08-19 ENCOUNTER — RESULTS FOLLOW-UP (OUTPATIENT)
Dept: SLEEP MEDICINE | Facility: CLINIC | Age: 50
End: 2025-08-19
Payer: COMMERCIAL

## 2025-08-19 DIAGNOSIS — E55.9 VITAMIN D DEFICIENCY: ICD-10-CM

## 2025-08-19 DIAGNOSIS — G25.81 RLS (RESTLESS LEGS SYNDROME): Primary | ICD-10-CM

## 2025-08-19 DIAGNOSIS — E83.10 DISORDER OF IRON METABOLISM: ICD-10-CM

## 2025-08-19 RX ORDER — FERROUS SULFATE 325(65) MG
65 TABLET ORAL
Qty: 30 TABLET | Refills: 2 | Status: SHIPPED | OUTPATIENT
Start: 2025-08-19 | End: 2025-11-17

## 2025-08-20 RX ORDER — ERGOCALCIFEROL 1.25 MG/1
1.25 CAPSULE ORAL WEEKLY
Qty: 12 CAPSULE | Refills: 0 | Status: SHIPPED | OUTPATIENT
Start: 2025-08-20 | End: 2025-11-18

## 2025-08-26 ENCOUNTER — LAB (OUTPATIENT)
Dept: LAB | Facility: HOSPITAL | Age: 50
End: 2025-08-26
Payer: COMMERCIAL

## 2025-08-26 DIAGNOSIS — M47.812 CERVICAL SPONDYLOSIS: ICD-10-CM

## 2025-08-26 DIAGNOSIS — C61 MALIGNANT NEOPLASM OF PROSTATE (MULTI): Primary | ICD-10-CM

## 2025-08-26 PROCEDURE — 36415 COLL VENOUS BLD VENIPUNCTURE: CPT

## 2025-08-26 RX ORDER — CELECOXIB 100 MG/1
100 CAPSULE ORAL 2 TIMES DAILY
Qty: 180 CAPSULE | Refills: 2 | Status: SHIPPED | OUTPATIENT
Start: 2025-08-26

## 2025-08-27 LAB — PSA SERPL-MCNC: 0.16 NG/ML

## 2025-09-02 ENCOUNTER — OFFICE VISIT (OUTPATIENT)
Dept: UROLOGY | Facility: HOSPITAL | Age: 50
End: 2025-09-02
Payer: COMMERCIAL

## 2025-09-02 VITALS
DIASTOLIC BLOOD PRESSURE: 75 MMHG | RESPIRATION RATE: 18 BRPM | BODY MASS INDEX: 28.9 KG/M2 | HEART RATE: 105 BPM | TEMPERATURE: 97.2 F | SYSTOLIC BLOOD PRESSURE: 116 MMHG | WEIGHT: 207.23 LBS | OXYGEN SATURATION: 100 %

## 2025-09-02 DIAGNOSIS — C61 MALIGNANT NEOPLASM OF PROSTATE (MULTI): Primary | ICD-10-CM

## 2025-09-02 PROCEDURE — 3078F DIAST BP <80 MM HG: CPT | Performed by: STUDENT IN AN ORGANIZED HEALTH CARE EDUCATION/TRAINING PROGRAM

## 2025-09-02 PROCEDURE — 99213 OFFICE O/P EST LOW 20 MIN: CPT | Performed by: STUDENT IN AN ORGANIZED HEALTH CARE EDUCATION/TRAINING PROGRAM

## 2025-09-02 PROCEDURE — 3074F SYST BP LT 130 MM HG: CPT | Performed by: STUDENT IN AN ORGANIZED HEALTH CARE EDUCATION/TRAINING PROGRAM

## 2025-09-02 ASSESSMENT — PAIN SCALES - GENERAL: PAINLEVEL_OUTOF10: 9

## 2025-09-03 ENCOUNTER — EVALUATION (OUTPATIENT)
Dept: PHYSICAL THERAPY | Facility: HOSPITAL | Age: 50
End: 2025-09-03
Payer: COMMERCIAL

## 2025-09-03 DIAGNOSIS — M47.812 CERVICAL SPONDYLOSIS: ICD-10-CM

## 2025-09-03 DIAGNOSIS — M54.16 LUMBAR NEURITIS: ICD-10-CM

## 2025-09-03 DIAGNOSIS — M54.12 CERVICAL NEURITIS: Primary | ICD-10-CM

## 2025-09-03 DIAGNOSIS — M47.816 LUMBAR SPONDYLOSIS: ICD-10-CM

## 2025-09-03 PROCEDURE — 97112 NEUROMUSCULAR REEDUCATION: CPT | Mod: GP

## 2025-09-03 PROCEDURE — 97162 PT EVAL MOD COMPLEX 30 MIN: CPT | Mod: GP

## 2025-11-17 ENCOUNTER — APPOINTMENT (OUTPATIENT)
Dept: PRIMARY CARE | Facility: CLINIC | Age: 50
End: 2025-11-17
Payer: COMMERCIAL

## (undated) DEVICE — CATHETER TRAY, SURESTEP, 16FR, URINE METER W/STATLOCK

## (undated) DEVICE — KIT, MINOR, DOUBLE BASIN

## (undated) DEVICE — SYRINGE, 20 CC, CONTROL, LUER LOCK, LF

## (undated) DEVICE — SUTURE, MONOCRYL 2-0 UR-6 27 IN, VIOLET

## (undated) DEVICE — COVER, BACK TABLE

## (undated) DEVICE — RESERVOIR, TITAN CL, WITH LOCK OUT VALVE, 125CC

## (undated) DEVICE — DRESSING, GAUZE, FLUFF, 1 PLY, 18 X 36 IN

## (undated) DEVICE — SUTURE, STRATAFIX, 3-0 MONOCRYL PLUS, PS-2 SPIRAL UNDYED

## (undated) DEVICE — SUTURE, VICRYL, 2-0, 27 IN, SH, UNDYED

## (undated) DEVICE — SPONGE, LAP, XRAY DECT, 18IN X 18IN, W/MASTER DMT, STERILE

## (undated) DEVICE — SUTURE, PDS II, 2-0, 27 IN, CT2, VIOLET

## (undated) DEVICE — DRAPE, INCISE, ANTIMICROBIAL, IOBAN 2, LARGE, 17 X 23 IN, DISPOSABLE, STERILE

## (undated) DEVICE — TOWEL, SURGICAL, NEURO, O/R, 16 X 26, BLUE, STERILE

## (undated) DEVICE — NEEDLE, HYPODERMIC, MONOJECT, 25 G X 1.5 IN, LUER LOCK HUB, RED

## (undated) DEVICE — SUTURE, ETHILON, 3-0, 18 IN, PS2, BLACK

## (undated) DEVICE — DRAPE, SHEET, ENDOSCOPY, GENERAL, FENESTRATED, ARMBOARD COVER, 98 X 123.5 IN, DISPOSABLE, LF, STERILE

## (undated) DEVICE — SLEEVE, SCD EXPRESS, KNEE LENGTH-MEDIUM

## (undated) DEVICE — ADHESIVE, SKIN, LIQUIBAND EXCEED

## (undated) DEVICE — SUTURE, MONOCRYL, 4-0, 27 IN, PS-2, UNDYED

## (undated) DEVICE — EVACUATOR, WOUND, SUCTION, CLOSED, JACKSON-PRATT, 100 CC, SILICONE

## (undated) DEVICE — SUTURE, VICRYL, 3-0, 27 IN, SH

## (undated) DEVICE — DRESSING, NON-ADHERENT, TELFA, 3 X 8 IN, NS

## (undated) DEVICE — SUPPORTER, ATHLETIC, ADULT, LARGE

## (undated) DEVICE — SYRINGE, 50 CC, LUER LOCK

## (undated) DEVICE — WOUND SYSTEM, DEBRIDEMENT & CLEANING, O.R DUOPAK

## (undated) DEVICE — TRAY, SKIN SCRUB, WET PREP, WITH 4 COMPARMENT

## (undated) DEVICE — Device

## (undated) DEVICE — DRAIN, WOUND, ROUND, W/TROCAR, HOLE PATTERN, 10 IN, MEDIUM, 1/8 X 49 IN

## (undated) DEVICE — NEEDLE, BLOOD COLLECTION, W/12 IN TUBING, SAF-T WING, 21 G X 0.75 IN